# Patient Record
Sex: FEMALE | Race: BLACK OR AFRICAN AMERICAN | NOT HISPANIC OR LATINO | Employment: FULL TIME | URBAN - METROPOLITAN AREA
[De-identification: names, ages, dates, MRNs, and addresses within clinical notes are randomized per-mention and may not be internally consistent; named-entity substitution may affect disease eponyms.]

---

## 2017-02-03 ENCOUNTER — ALLSCRIPTS OFFICE VISIT (OUTPATIENT)
Dept: OTHER | Facility: OTHER | Age: 47
End: 2017-02-03

## 2017-02-15 ENCOUNTER — HOSPITAL ENCOUNTER (OUTPATIENT)
Dept: MAMMOGRAPHY | Facility: MEDICAL CENTER | Age: 47
Discharge: HOME | End: 2017-02-15
Payer: COMMERCIAL

## 2017-02-15 DIAGNOSIS — Z12.31 ENCOUNTER FOR SCREENING MAMMOGRAM FOR MALIGNANT NEOPLASM OF BREAST: ICD-10-CM

## 2017-02-15 PROCEDURE — G0202 SCR MAMMO BI INCL CAD: HCPCS

## 2017-02-15 PROCEDURE — 77063 BREAST TOMOSYNTHESIS BI: CPT

## 2017-02-20 ENCOUNTER — GENERIC CONVERSION - ENCOUNTER (OUTPATIENT)
Dept: OTHER | Facility: OTHER | Age: 47
End: 2017-02-20

## 2017-05-05 ENCOUNTER — ALLSCRIPTS OFFICE VISIT (OUTPATIENT)
Dept: OTHER | Facility: OTHER | Age: 47
End: 2017-05-05

## 2017-05-22 ENCOUNTER — GENERIC CONVERSION - ENCOUNTER (OUTPATIENT)
Dept: OTHER | Facility: OTHER | Age: 47
End: 2017-05-22

## 2017-06-01 ENCOUNTER — GENERIC CONVERSION - ENCOUNTER (OUTPATIENT)
Dept: OTHER | Facility: OTHER | Age: 47
End: 2017-06-01

## 2017-06-12 ENCOUNTER — GENERIC CONVERSION - ENCOUNTER (OUTPATIENT)
Dept: OTHER | Facility: OTHER | Age: 47
End: 2017-06-12

## 2017-07-12 ENCOUNTER — ALLSCRIPTS OFFICE VISIT (OUTPATIENT)
Dept: OTHER | Facility: OTHER | Age: 47
End: 2017-07-12

## 2017-08-01 ENCOUNTER — GENERIC CONVERSION - ENCOUNTER (OUTPATIENT)
Dept: OTHER | Facility: OTHER | Age: 47
End: 2017-08-01

## 2017-09-06 ENCOUNTER — GENERIC CONVERSION - ENCOUNTER (OUTPATIENT)
Dept: OTHER | Facility: OTHER | Age: 47
End: 2017-09-06

## 2018-01-12 VITALS
DIASTOLIC BLOOD PRESSURE: 78 MMHG | BODY MASS INDEX: 30.18 KG/M2 | HEIGHT: 62 IN | WEIGHT: 164 LBS | SYSTOLIC BLOOD PRESSURE: 132 MMHG

## 2018-01-12 NOTE — RESULT NOTES
Dear Blade Loja,   Your test results have returned and are listed below:      Discussion/Summary  Your results show some abnormalities  Your vitamin D level is low, which can affect your bone health  Recommend that you take 4000 to 5000 IU of vitamin D3 per day, which is found over the counter  In addition, you need to take 1200 to 1500 mg of calcium citrate per day, in divided doses of 500 to 600 mg each  Your level will be checked again at your annual follow up  Please keep your regularly scheduled follow-up appointment  If you do not have a follow-up scheduled, please call the office to schedule a follow-up visit  If you have any questions, please don't hesitate to call the office  Sincerely,      Signatures   Electronically signed by : NICA Davenport; May 12 2016  4:30PM EST                       (Author)    Electronically signed by :  LINA Joyner Ra ; May 19 2016  1:33PM EST

## 2018-01-13 VITALS
SYSTOLIC BLOOD PRESSURE: 138 MMHG | TEMPERATURE: 98.1 F | WEIGHT: 161 LBS | HEIGHT: 62 IN | BODY MASS INDEX: 29.63 KG/M2 | DIASTOLIC BLOOD PRESSURE: 80 MMHG

## 2018-01-15 VITALS
WEIGHT: 165 LBS | BODY MASS INDEX: 30.36 KG/M2 | HEIGHT: 62 IN | DIASTOLIC BLOOD PRESSURE: 78 MMHG | SYSTOLIC BLOOD PRESSURE: 128 MMHG

## 2018-02-28 ENCOUNTER — OFFICE VISIT (OUTPATIENT)
Dept: FAMILY MEDICINE CLINIC | Facility: CLINIC | Age: 48
End: 2018-02-28
Payer: COMMERCIAL

## 2018-02-28 ENCOUNTER — TELEPHONE (OUTPATIENT)
Dept: FAMILY MEDICINE CLINIC | Facility: CLINIC | Age: 48
End: 2018-02-28

## 2018-02-28 VITALS
BODY MASS INDEX: 28.16 KG/M2 | WEIGHT: 153 LBS | TEMPERATURE: 98.3 F | HEIGHT: 62 IN | DIASTOLIC BLOOD PRESSURE: 88 MMHG | SYSTOLIC BLOOD PRESSURE: 128 MMHG

## 2018-02-28 DIAGNOSIS — R11.2 NAUSEA AND VOMITING, INTRACTABILITY OF VOMITING NOT SPECIFIED, UNSPECIFIED VOMITING TYPE: ICD-10-CM

## 2018-02-28 DIAGNOSIS — R51.9 NONINTRACTABLE HEADACHE, UNSPECIFIED CHRONICITY PATTERN, UNSPECIFIED HEADACHE TYPE: ICD-10-CM

## 2018-02-28 DIAGNOSIS — R19.7 DIARRHEA, UNSPECIFIED TYPE: ICD-10-CM

## 2018-02-28 DIAGNOSIS — K21.9 GASTROESOPHAGEAL REFLUX DISEASE WITHOUT ESOPHAGITIS: ICD-10-CM

## 2018-02-28 DIAGNOSIS — R10.11 RUQ ABDOMINAL PAIN: Primary | ICD-10-CM

## 2018-02-28 PROCEDURE — 99214 OFFICE O/P EST MOD 30 MIN: CPT | Performed by: FAMILY MEDICINE

## 2018-02-28 RX ORDER — MAGNESIUM 200 MG
TABLET ORAL
COMMUNITY

## 2018-02-28 RX ORDER — EPINEPHRINE 0.3 MG/.3ML
INJECTION SUBCUTANEOUS
COMMUNITY
Start: 2014-07-07 | End: 2020-12-09 | Stop reason: SDUPTHER

## 2018-02-28 RX ORDER — PROMETHAZINE HYDROCHLORIDE 25 MG/1
25 TABLET ORAL DAILY PRN
Qty: 5 TABLET | Refills: 0 | Status: SHIPPED | OUTPATIENT
Start: 2018-02-28 | End: 2020-12-02

## 2018-02-28 RX ORDER — PANTOPRAZOLE SODIUM 20 MG/1
20 TABLET, DELAYED RELEASE ORAL DAILY
Qty: 30 TABLET | Refills: 0 | Status: ON HOLD | OUTPATIENT
Start: 2018-02-28 | End: 2018-04-04 | Stop reason: ALTCHOICE

## 2018-02-28 RX ORDER — GREEN TEA/HOODIA GORDONII 315-12.5MG
CAPSULE ORAL
COMMUNITY

## 2018-02-28 RX ORDER — CALCIUM CITRATE 1040MG
TABLET ORAL
COMMUNITY

## 2018-02-28 RX ORDER — OMEPRAZOLE 20 MG/1
20 CAPSULE, DELAYED RELEASE ORAL DAILY
Qty: 30 CAPSULE | Refills: 0 | Status: SHIPPED | OUTPATIENT
Start: 2018-02-28 | End: 2018-02-28

## 2018-02-28 RX ORDER — ERGOCALCIFEROL 1.25 MG/1
1 CAPSULE ORAL WEEKLY
COMMUNITY
Start: 2017-02-03

## 2018-02-28 NOTE — PROGRESS NOTES
Assessment/Plan:   Chief Complaint   Patient presents with    Vomiting     started two days ago    Diarrhea     pt stated that she needs medication for gas    Headache     Patient Instructions   RUQ abdominal pain off and on for 1 year, worse over the last 3 days with vomiting  Hx of GERd, start omeprazole 20 mg once daily prn gerd/gastritis  Check labs and check US for RUQ abdominal pain and consult General Surgery Dr Marivel Bhatia for abdominal pain  Decrease and quit ETOH use  Diagnoses and all orders for this visit:    RUQ abdominal pain  -     omeprazole (PriLOSEC) 20 mg delayed release capsule; Take 1 capsule (20 mg total) by mouth daily  -     Comprehensive metabolic panel; Future  -     CBC and differential; Future  -     Amylase; Future  -     Lipase; Future    Nausea and vomiting, intractability of vomiting not specified, unspecified vomiting type  -     Comprehensive metabolic panel; Future  -     CBC and differential; Future  -     Amylase; Future  -     Lipase; Future  -     promethazine (PHENERGAN) 25 mg tablet; Take 1 tablet (25 mg total) by mouth daily as needed for nausea or vomiting    Diarrhea, unspecified type  -     Comprehensive metabolic panel; Future  -     CBC and differential; Future  -     Amylase; Future  -     Lipase; Future    Nonintractable headache, unspecified chronicity pattern, unspecified headache type    Gastroesophageal reflux disease without esophagitis  -     omeprazole (PriLOSEC) 20 mg delayed release capsule; Take 1 capsule (20 mg total) by mouth daily          Subjective:     Patient ID: April L Elizabeth Jones is a 57 y o  female  Here for vomiting and diarrhea and flatulence and also headache  This all started this past Monday  Seen for RUQ abdominal pain by Dr Acotsa Due ago about 1 year ago worse with gagging or when gagging it hurts and worse this week due to vomiting and gagging  Ate chicken yesterday  Ate breakfast this am without problems   Hx of GERD and used Aciphex in the past  Hx of gastric bypass in 2011  Drinks alcohol daily of 4-5 shots/ every other day and on weekends of [employer] since 2013  Review of Systems   Constitutional: Negative  HENT: Negative  Eyes: Negative  Respiratory: Negative  Cardiovascular: Negative  Gastrointestinal: Positive for abdominal pain (RUQ abdominal pain with gagging and vomiting), diarrhea and nausea  Vomiting Monday and Tuesday   Endocrine: Negative  Genitourinary: Negative  Musculoskeletal: Negative  Skin: Negative  Allergic/Immunologic: Negative  Neurological: Positive for headaches  Hematological: Negative  Psychiatric/Behavioral: Negative  Objective:     Physical Exam   Constitutional: She is oriented to person, place, and time  She appears well-developed and well-nourished  HENT:   Head: Normocephalic and atraumatic  Right Ear: External ear normal    Left Ear: External ear normal    Nose: Nose normal    Mouth/Throat: Oropharynx is clear and moist    Eyes: Conjunctivae and EOM are normal  Pupils are equal, round, and reactive to light  Neck: Normal range of motion  Neck supple  Cardiovascular: Normal rate, regular rhythm, normal heart sounds and intact distal pulses  Pulmonary/Chest: Effort normal and breath sounds normal    Abdominal: Soft  Bowel sounds are normal    RUQ abdominal pain 8/10 at its worst     Musculoskeletal: Normal range of motion  Neurological: She is alert and oriented to person, place, and time  She has normal reflexes  Skin: Skin is warm and dry  Psychiatric: She has a normal mood and affect   Her behavior is normal

## 2018-02-28 NOTE — LETTER
February 28, 2018     Patient: Yaneth Bassett   YOB: 1970   Date of Visit: 2/28/2018       To Whom it May Concern:    April Anuj Pfeiffer is under my professional care  She was seen in my office on 2/28/2018  She may return to work on 03/05/2018  April has been out of work since 02/26/2018  If you have any questions or concerns, please don't hesitate to call           Sincerely,          Karey Card DO        CC: No Recipients

## 2018-02-28 NOTE — TELEPHONE ENCOUNTER
[facility] called stating that Pantoprazole is not covered at all  They are asking to give generic Nexium 20 mg once daily    Please advise

## 2018-03-01 ENCOUNTER — HOSPITAL ENCOUNTER (OUTPATIENT)
Dept: ULTRASOUND IMAGING | Facility: HOSPITAL | Age: 48
Discharge: HOME | End: 2018-03-01
Payer: COMMERCIAL

## 2018-03-01 ENCOUNTER — APPOINTMENT (OUTPATIENT)
Dept: LAB | Facility: HOSPITAL | Age: 48
End: 2018-03-01
Payer: COMMERCIAL

## 2018-03-01 DIAGNOSIS — R11.2 NAUSEA AND VOMITING, INTRACTABILITY OF VOMITING NOT SPECIFIED, UNSPECIFIED VOMITING TYPE: ICD-10-CM

## 2018-03-01 DIAGNOSIS — R19.7 DIARRHEA, UNSPECIFIED TYPE: ICD-10-CM

## 2018-03-01 DIAGNOSIS — K21.9 GASTROESOPHAGEAL REFLUX DISEASE WITHOUT ESOPHAGITIS: ICD-10-CM

## 2018-03-01 DIAGNOSIS — R10.11 RUQ ABDOMINAL PAIN: ICD-10-CM

## 2018-03-01 LAB
ALBUMIN SERPL BCP-MCNC: 3.8 G/DL (ref 3.5–5)
ALP SERPL-CCNC: 111 U/L (ref 46–116)
ALT SERPL W P-5'-P-CCNC: 23 U/L (ref 12–78)
AMYLASE SERPL-CCNC: 36 IU/L (ref 25–115)
ANION GAP SERPL CALCULATED.3IONS-SCNC: 8 MMOL/L (ref 4–13)
AST SERPL W P-5'-P-CCNC: 26 U/L (ref 5–45)
BASOPHILS # BLD AUTO: 0.01 THOUSANDS/ΜL (ref 0–0.1)
BASOPHILS NFR BLD AUTO: 0 % (ref 0–1)
BILIRUB SERPL-MCNC: 0.75 MG/DL (ref 0.2–1)
BUN SERPL-MCNC: 10 MG/DL (ref 5–25)
CALCIUM SERPL-MCNC: 8.7 MG/DL (ref 8.3–10.1)
CHLORIDE SERPL-SCNC: 104 MMOL/L (ref 100–108)
CO2 SERPL-SCNC: 28 MMOL/L (ref 21–32)
CREAT SERPL-MCNC: 0.64 MG/DL (ref 0.6–1.3)
EOSINOPHIL # BLD AUTO: 0.06 THOUSAND/ΜL (ref 0–0.61)
EOSINOPHIL NFR BLD AUTO: 2 % (ref 0–6)
ERYTHROCYTE [DISTWIDTH] IN BLOOD BY AUTOMATED COUNT: 12.8 % (ref 11.6–15.1)
GFR SERPL CREATININE-BSD FRML MDRD: 124 ML/MIN/1.73SQ M
GLUCOSE SERPL-MCNC: 88 MG/DL (ref 65–140)
HCT VFR BLD AUTO: 38.8 % (ref 34.8–46.1)
HGB BLD-MCNC: 13.8 G/DL (ref 11.5–15.4)
LIPASE SERPL-CCNC: 114 U/L (ref 73–393)
LYMPHOCYTES # BLD AUTO: 2 THOUSANDS/ΜL (ref 0.6–4.47)
LYMPHOCYTES NFR BLD AUTO: 48 % (ref 14–44)
MCH RBC QN AUTO: 34 PG (ref 26.8–34.3)
MCHC RBC AUTO-ENTMCNC: 35.6 G/DL (ref 31.4–37.4)
MCV RBC AUTO: 96 FL (ref 82–98)
MONOCYTES # BLD AUTO: 0.44 THOUSAND/ΜL (ref 0.17–1.22)
MONOCYTES NFR BLD AUTO: 11 % (ref 4–12)
NEUTROPHILS # BLD AUTO: 1.61 THOUSANDS/ΜL (ref 1.85–7.62)
NEUTS SEG NFR BLD AUTO: 39 % (ref 43–75)
NRBC BLD AUTO-RTO: 0 /100 WBCS
PLATELET # BLD AUTO: 209 THOUSANDS/UL (ref 149–390)
PMV BLD AUTO: 10.1 FL (ref 8.9–12.7)
POTASSIUM SERPL-SCNC: 3.7 MMOL/L (ref 3.5–5.3)
PROT SERPL-MCNC: 7.5 G/DL (ref 6.4–8.2)
RBC # BLD AUTO: 4.06 MILLION/UL (ref 3.81–5.12)
SODIUM SERPL-SCNC: 140 MMOL/L (ref 136–145)
WBC # BLD AUTO: 4.12 THOUSAND/UL (ref 4.31–10.16)

## 2018-03-01 PROCEDURE — 85025 COMPLETE CBC W/AUTO DIFF WBC: CPT

## 2018-03-01 PROCEDURE — 76700 US EXAM ABDOM COMPLETE: CPT

## 2018-03-01 PROCEDURE — 83690 ASSAY OF LIPASE: CPT

## 2018-03-01 PROCEDURE — 36415 COLL VENOUS BLD VENIPUNCTURE: CPT

## 2018-03-01 PROCEDURE — 82150 ASSAY OF AMYLASE: CPT

## 2018-03-01 PROCEDURE — 80053 COMPREHEN METABOLIC PANEL: CPT

## 2018-03-02 ENCOUNTER — OFFICE VISIT (OUTPATIENT)
Dept: SURGERY | Facility: CLINIC | Age: 48
End: 2018-03-02
Payer: COMMERCIAL

## 2018-03-02 VITALS
RESPIRATION RATE: 18 BRPM | TEMPERATURE: 96.9 F | BODY MASS INDEX: 28.34 KG/M2 | WEIGHT: 154 LBS | HEART RATE: 74 BPM | HEIGHT: 62 IN | SYSTOLIC BLOOD PRESSURE: 120 MMHG | DIASTOLIC BLOOD PRESSURE: 82 MMHG

## 2018-03-02 DIAGNOSIS — L90.5 SCAR PAIN: ICD-10-CM

## 2018-03-02 DIAGNOSIS — R10.11 RUQ ABDOMINAL PAIN: ICD-10-CM

## 2018-03-02 DIAGNOSIS — K43.9 VENTRAL HERNIA WITHOUT OBSTRUCTION OR GANGRENE: ICD-10-CM

## 2018-03-02 DIAGNOSIS — R10.11 RIGHT UPPER QUADRANT ABDOMINAL PAIN: ICD-10-CM

## 2018-03-02 DIAGNOSIS — R10.13 EPIGASTRIC PAIN: Primary | ICD-10-CM

## 2018-03-02 DIAGNOSIS — R11.2 NAUSEA AND VOMITING, INTRACTABILITY OF VOMITING NOT SPECIFIED, UNSPECIFIED VOMITING TYPE: ICD-10-CM

## 2018-03-02 DIAGNOSIS — R19.7 DIARRHEA, UNSPECIFIED TYPE: ICD-10-CM

## 2018-03-02 DIAGNOSIS — K21.9 GASTROESOPHAGEAL REFLUX DISEASE WITHOUT ESOPHAGITIS: ICD-10-CM

## 2018-03-02 PROCEDURE — 99243 OFF/OP CNSLTJ NEW/EST LOW 30: CPT | Performed by: SURGERY

## 2018-03-02 NOTE — PROGRESS NOTES
Assessment/Plan:   Echo Tony is a 57 y  o female who is here for The primary encounter diagnosis was Right upper quadrant abdominal pain  Diagnoses of RUQ abdominal pain, Nausea and vomiting, intractability of vomiting not specified, unspecified vomiting type, Diarrhea, unspecified type, and Gastroesophageal reflux disease without esophagitis were also pertinent to this visit  Plan:  Follow up with bariatric surgery consider EGD for ulcer, considering her increased alcohol intake      CT scan of abdomen and pelvis to rule out a port site hernia which is not palpable today but clinically meets that description  If she can not getting in a timely fashion to see the bariatric surgeon, we will schedule her EGD for her  Preoperative Clearance: None            ______________________________________________________  CC:Abdominal Pain (right sided comes and goes  1 year ) and Patient Education (Given to patient )    HPI:  Echo Villarreal is a 57 y  o female who was referred for evaluation of Abdominal Pain (right sided comes and goes  1 year ) and Patient Education (Given to patient )    Currently  Tender at site of previous hernia repair  With scar, she says there is a lump or bulge when she is driving or working out but I cannot see this today  She also quietly admitted that she has been increasing her alcohol intake and she thinks she might have an ulcer  Repaired three years ago, after a previous gastric bypass in 2011  Now with more pain at that area  Pops out when she coughs,  Two thousand eleven she had a Darion-en-Y gastric bypass by Dr Ana Maria Trujillo  Two thousand thirteen she had an internal hernia at [facility] defect that was repaired at that time  I do not believe she had a abdominal wall hernia repaired at that time        ROS:  General ROS: negative  negative for - chills, fatigue, fever or night sweats, weight loss  Respiratory ROS: no cough, shortness of breath, or wheezing  Cardiovascular ROS: no chest pain or dyspnea on exertion  Genito-Urinary ROS: no dysuria, trouble voiding, or hematuria  Musculoskeletal ROS: negative for - gait disturbance, joint pain or muscle pain  Neurological ROS: no TIA or stroke symptoms  abdominal pain and see HPI  Skin ROS: no new rashes or lesions   Lymphatic ROS: no new adenopathy noted by pt  GYN ROS: see HPI, no new GYN history or bleeding noted  Psy ROS: no new mental or behavioral disturbances       There is no problem list on file for this patient          Allergies:  Crab extract allergy skin test; Penicillins; and Amoxicillin      Current Outpatient Prescriptions:     Calcium Citrate 1040 MG TABS, Take by mouth, Disp: , Rfl:     Cyanocobalamin (VITAMIN B-12) 1000 MCG SUBL, Place under the tongue, Disp: , Rfl:     EPINEPHrine (EPIPEN 2-ANIKA) 0 3 mg/0 3 mL SOAJ, Inject as directed, Disp: , Rfl:     ergocalciferol (VITAMIN D2) 50,000 units, Take 1 capsule by mouth once a week, Disp: , Rfl:     Multiple Vitamins-Minerals (HAIR/SKIN/NAILS/BIOTIN) TABS, Take by mouth, Disp: , Rfl:     promethazine (PHENERGAN) 25 mg tablet, Take 1 tablet (25 mg total) by mouth daily as needed for nausea or vomiting, Disp: 5 tablet, Rfl: 0    dicyclomine (BENTYL) 20 mg tablet, Take 1 tablet by mouth every 6 (six) hours as needed (abd pain) for up to 12 doses, Disp: 12 tablet, Rfl: 0    ondansetron (ZOFRAN-ODT) 4 mg disintegrating tablet, Take 1 tablet by mouth every 8 (eight) hours as needed for nausea or vomiting for up to 10 doses, Disp: 10 tablet, Rfl: 0    pantoprazole (PROTONIX) 20 mg tablet, Take 1 tablet (20 mg total) by mouth daily, Disp: 30 tablet, Rfl: 0    Pediatric Multivitamins-Fl (MULTIVITAMINS/FLUORIDE) 0 25 MG CHEW, Chew, Disp: , Rfl:     Past Medical History:   Diagnosis Date    Frequent headaches        Past Surgical History:   Procedure Laterality Date    GASTRIC BYPASS      ROTATOR CUFF REPAIR         Family History   Problem Relation Age of Onset    No Known Problems Family         reports that she has been smoking  She does not have any smokeless tobacco history on file  She reports that she drinks alcohol  She reports that she does not use drugs  Labs:   Lab Results   Component Value Date    WBC 4 12 (L) 03/01/2018    HGB 13 8 03/01/2018    HCT 38 8 03/01/2018    MCV 96 03/01/2018     03/01/2018     Lab Results   Component Value Date     03/01/2018    K 3 7 03/01/2018     03/01/2018    CO2 28 03/01/2018    ANIONGAP 8 03/01/2018    BUN 10 03/01/2018    CREATININE 0 64 03/01/2018    GLUCOSE 88 03/01/2018    CALCIUM 8 7 03/01/2018    AST 26 03/01/2018    ALT 23 03/01/2018    ALKPHOS 111 03/01/2018    PROT 7 5 03/01/2018    BILITOT 0 75 03/01/2018    EGFR 124 03/01/2018         Imaging: No new pertinent imaging studies  PHYSICAL EXAM  General Appearance:    Alert, cooperative, no distress,    Head:    Normocephalic without obvious abnormality   Eyes:    PERRL, conjunctiva/corneas clear, EOM's intact        Neck:   Supple, no adenopathy, no JVD   Back:     Symmetric, no spinal or CVA tenderness   Lungs:     Clear to auscultation bilaterally, no wheezing or rhonchi   Heart:    Regular rate and rhythm, S1 and S2 normal, no murmur   Abdomen:    soft NTND, +BS   Extremities:   Extremities normal  No clubbing, cyanosis or edema   Psych:   Normal Affect, AOx3  Neurologic:  Skin:   CNII-XII intact  Strength symmetric, speech intact    Warm, dry, intact, no visible rashes or lesions                       Some portions of this record may have been generated with voice recognition software  There may be translation, syntax,  or grammatical errors  Occasional wrong word or "sound-a-like" substitutions may have occurred due to the inherent limitations of the voice recognition software  Read the chart carefully and recognize, using context, where substitutions may have occurred   If you have any questions, please contact the dictating provider for clarification or correction, as needed  This encounter has been coded by a non-certified coder         Radha Holt MD    Date: 3/2/2018 Time: 11:26 AM

## 2018-03-08 ENCOUNTER — HOSPITAL ENCOUNTER (OUTPATIENT)
Dept: CT IMAGING | Facility: HOSPITAL | Age: 48
Discharge: HOME | End: 2018-03-08
Attending: SURGERY
Payer: COMMERCIAL

## 2018-03-08 ENCOUNTER — OFFICE VISIT (OUTPATIENT)
Dept: BARIATRICS | Facility: CLINIC | Age: 48
End: 2018-03-08
Payer: COMMERCIAL

## 2018-03-08 VITALS
BODY MASS INDEX: 28.98 KG/M2 | RESPIRATION RATE: 16 BRPM | SYSTOLIC BLOOD PRESSURE: 126 MMHG | TEMPERATURE: 97.6 F | WEIGHT: 157.5 LBS | HEART RATE: 72 BPM | DIASTOLIC BLOOD PRESSURE: 78 MMHG | HEIGHT: 62 IN

## 2018-03-08 DIAGNOSIS — K91.2 POSTSURGICAL MALABSORPTION: ICD-10-CM

## 2018-03-08 DIAGNOSIS — R10.11 RIGHT UPPER QUADRANT ABDOMINAL PAIN: ICD-10-CM

## 2018-03-08 DIAGNOSIS — Z98.84 BARIATRIC SURGERY STATUS: ICD-10-CM

## 2018-03-08 DIAGNOSIS — R10.13 EPIGASTRIC PAIN: Primary | ICD-10-CM

## 2018-03-08 DIAGNOSIS — K43.9 VENTRAL HERNIA WITHOUT OBSTRUCTION OR GANGRENE: ICD-10-CM

## 2018-03-08 PROBLEM — M85.80 OSTEOPENIA: Status: ACTIVE | Noted: 2017-02-03

## 2018-03-08 PROCEDURE — 99214 OFFICE O/P EST MOD 30 MIN: CPT | Performed by: PHYSICIAN ASSISTANT

## 2018-03-08 PROCEDURE — 74177 CT ABD & PELVIS W/CONTRAST: CPT

## 2018-03-08 RX ORDER — OMEPRAZOLE 20 MG/1
20 TABLET, DELAYED RELEASE ORAL DAILY
COMMUNITY
End: 2021-09-17 | Stop reason: SDUPTHER

## 2018-03-08 RX ADMIN — IOHEXOL 100 ML: 350 INJECTION, SOLUTION INTRAVENOUS at 13:24

## 2018-03-08 NOTE — PROGRESS NOTES
Assessment/Plan:    Bariatric surgery status  -s/p Darion-En-Y Gastric Bypass with Dr Aleksey Trammell on 3/7/2011  Overall doing Well with weight loss but unfortunately is smoking and drinking alcohol daily  She is here with right sided abdominal pain and epigastric abdominal pain  She has been lost to follow-up to our office for 2 years  Initial:   Current: 157 5 lb  EWL: 73% which is very good weight loss overall  Sudheer: current  Current BMI is Body mass index is 29 28 kg/m²  Tolerating a regular diet-yes but has been having intermittent epigastric pain with meals   Eating at least 60 grams of protein per day-has been inconsistent with her intakes  Following 30/60 minute rule with liquids-no  Drinking at least 64 ounces of fluid per day-yes  Drinking carbonated beverages-no  Sufficient exercise-yes  Using NSAIDs regularly-no  Using nicotine-yes  Using alcohol-yes    She notes she is smoking "clove cigars with nicotine" up to 3 per day and is drinking at least 5 alcoholic shots most days of the week-  Advised on effect of alcohol post-surgery and also effect of smoking with gastric pouch  Advised on importance of abstinence  Right upper quadrant abdominal pain  She also complains of a separate right upper quadrant focalized abdominal pain and "bulge" that occurs particularly when she gags when brushing her teeth  On exam the area that tends to cause her pain is around her right mid-incision site and is concerning for a possible incisional hernia  Pain is not worsened with meals and per her description reduces with massaging the area  I was unable to appreciate this on exam  She had no pain on palpation  And Hernandez's sign was negative/no rebound or guarding  She notes she is getting a CT scan of abdomen today for evaluation of this  She has had a consultation with Dr Fabien Valdez for this   Per patient she indicated since she has already been established with Dr Aleksey Trammell she feels if she needs surgery for this she would want him to do it  Advised that either Dr Savi Hensley or Dr Amando Angeles can address this if she potentially needs a hernia repair  I am scheduling upper endoscopy to rule out ulcer  And then follow-up with Dr Amando Angeles and he can review both EGD and CT results if/as needed  Will ask our  to also reach out to her regarding alcohol intakes and smoking  Epigastric pain  She is complaining of a few week history of  Intermittent epigastric abdominal pain which is worse with meals and worse with drinking alcohol  She notes pain has improved at times with taking peptobismol  Unfortunately she indicates she drinks around 5 alcoholic shots/day and is smoking clove cigars(nicotine) up to 3 per day  She denies use of NSAIDs  No associated fever,chills, nausea or vomiting  On exam she was mildly tender to palpation to epigastric area without rebound or guarding  Concern is for ulcer and advised on importance of smoking cessation and avoiding alcohol  Advised continued use could be life-altering and advised of effect after gastric surgery  Postsurgical malabsorption  -At risk for malabsorption of vitamins/minerals secondary to malabsorption and restriction of intake from their procedure  -Currently taking adequate postop bariatric surgery vitamin supplementation-no  -Last set of bariatric labs completed on May 206 and are not within acceptable limits   -Next set of bariatric labs ordered for approximately 1 month     She is only taking one regular mvi, one calcium, extra D and extra Vitamin B12 but notes she is not consistent-advised on importance of same  I am providing her with written instructions on what she should be taking-advised to take these for at least one month prior to getting her labs done  Alcohol use disorder ([facility] [address] )  By history-and reports drinking hard liquor up to 5 shots/day  Will have  contact her for follow-up         Diagnoses and all orders for this visit:    Epigastric pain    Bariatric surgery status    Postsurgical malabsorption    Right upper quadrant abdominal pain    Other orders  -     omeprazole (PriLOSEC OTC) 20 MG tablet; Take 20 mg by mouth daily          Subjective:      Patient ID: April L Kwadwo Devine is a 57 y o  female  She is here  For late routine visit and is complaining of a right sided pain and epigastric pain  She is taking some regular vitamins but not a complete amount and is not consistent with this  No fever,chills, nausea/vomiting with the pain  The following portions of the patient's history were reviewed and updated as appropriate: allergies, current medications, past family history, past medical history, past social history, past surgical history and problem list     Review of Systems   Constitutional: Negative for chills and fever  Weight is stable from 2 years ago   Respiratory: Negative for shortness of breath and wheezing  Gastrointestinal: Positive for abdominal pain  Negative for constipation, diarrhea and nausea  Psychiatric/Behavioral:        Reports mood is good         Objective:      /78 (BP Location: Right leg, Patient Position: Sitting, Cuff Size: Standard)   Pulse 72   Temp 97 6 °F (36 4 °C) (Tympanic)   Resp 16   Ht 5' 1 5" (1 562 m)   Wt 71 4 kg (157 lb 8 oz)   BMI 29 28 kg/m²          Physical Exam   Constitutional: She is oriented to person, place, and time  She appears well-developed and well-nourished  HENT:   Mouth/Throat: Oropharynx is clear and moist    Eyes: Conjunctivae are normal  No scleral icterus  Cardiovascular: Normal rate and regular rhythm  Pulmonary/Chest: Effort normal and breath sounds normal    Abdominal: Soft  Bowel sounds are normal  She exhibits no mass  There is tenderness  There is no rebound and no guarding  No incisional hernias appreciated   Musculoskeletal:   Normal gait   Neurological: She is alert and oriented to person, place, and time  Psychiatric: She has a normal mood and affect  Her behavior is normal  Judgment and thought content normal    Nursing note and vitals reviewed  GOALS: Maintain  weight loss with good nutrition intakes    Normal vitamin and mineral levels  Exercise as tolerated  Resolve abdominal pain    BARRIERS: regular alcohol intakes and regular smoking

## 2018-03-08 NOTE — PATIENT INSTRUCTIONS
Get Cat scan done as ordered  Get upper endoscopy done and then follow-up in the office with Dr Josiah Villalobos to review results  Take your antacid twice a day -before breakfast and dinner daily  Avoid all smoking and alcohol    Follow-up in 1 year for routine visit  Follow diet as discussed  Get lab work done as ordered    It is recommended to check with your insurance BEFORE getting labs done to make sure they are covered by your policy  Make sure to HOLD any multivitamins that may contain biotin and any biotin supplements FOR 5 DAYS before any labs since it can affect the results  Follow vitamin  and mineral recommendations as reviewed with you  Exercise as tolerated  Call our office if you have any problems with abdominal pain especially associated with fever, chills, nausea, vomiting or any other concerns  All  Post-bariatric surgery patients should be aware that very small quantities of any alcohol  can cause impairment and it is very possible not to feel the effect  The effect can be in the system for several hours  It is also a stomach irritant  It is advised to AVOID alcohol, Nonsteroidal antiinflammatory drugs (NSAIDS) and nicotine of all forms   Any of these can cause stomach irritation/pain

## 2018-03-09 NOTE — PROGRESS NOTES
Please call pt with abnormal results and schedule follow up  Please call port site, and the patient has gallstones  Bariatric surgery may want to also deal with with her gallstones especially for EGD is negative  Either 1 of these could be causing her right upper pain

## 2018-03-13 DIAGNOSIS — K91.2 POSTSURGICAL MALABSORPTION: Primary | ICD-10-CM

## 2018-03-13 DIAGNOSIS — Z98.84 BARIATRIC SURGERY STATUS: ICD-10-CM

## 2018-03-15 ENCOUNTER — OFFICE VISIT (OUTPATIENT)
Dept: BARIATRICS | Facility: CLINIC | Age: 48
End: 2018-03-15

## 2018-03-15 VITALS
DIASTOLIC BLOOD PRESSURE: 80 MMHG | BODY MASS INDEX: 29.35 KG/M2 | WEIGHT: 159.5 LBS | TEMPERATURE: 97.9 F | HEART RATE: 68 BPM | HEIGHT: 62 IN | SYSTOLIC BLOOD PRESSURE: 140 MMHG

## 2018-03-15 DIAGNOSIS — R10.11 RIGHT UPPER QUADRANT ABDOMINAL PAIN: ICD-10-CM

## 2018-03-15 DIAGNOSIS — R10.13 EPIGASTRIC PAIN: ICD-10-CM

## 2018-03-15 DIAGNOSIS — Z98.84 BARIATRIC SURGERY STATUS: Primary | ICD-10-CM

## 2018-03-15 DIAGNOSIS — K21.9 GASTROESOPHAGEAL REFLUX DISEASE, ESOPHAGITIS PRESENCE NOT SPECIFIED: ICD-10-CM

## 2018-03-15 PROCEDURE — RECHECK: Performed by: SURGERY

## 2018-03-15 NOTE — PROGRESS NOTES
Assessment/Plan: Patient is a 70-year-old woman status post Darion-en-Y gastric bypass by Dr Megan Garces in 2011  She underwent a diagnostic laparoscopy and repair of internal hernia in 2013  She has maintained a 71% excess weight loss  In the last several months, however, she has developed intermittent right upper quadrant pain  Workup in the last month with CT scan and ultrasound revealed gallstones  Per the patient's report of concomitant reflux symptoms, she will be undergoing an elective upper endoscopy on April 4th  At her post endoscopy follow-up, we will discuss surgical options for her gallbladder and any additional findings  Diagnoses and all orders for this visit:    Bariatric surgery status    Right upper quadrant abdominal pain    Epigastric pain    Gastroesophageal reflux disease, esophagitis presence not specified          Subjective: Patient doing well but continues to have intermittent, self-limited right upper quadrant pain  She is also by continued and increased alcohol intake; she has discuss this with our  and is continuing to follow-up  Patient ID: April ELVER Doherty is a 57 y o  female      HPI see assessment/plan    Review of Systems    Denies fever/chills  Denies lightheadedness  Denies visual changes  Denies dyspnea, cough  Denies chest pain  Denies nausea/vomiting  Denies change in urinary/bowel habits  Denies masses/hernias  Denies parasthesia  Denies peripheral edema    Objective:     Physical Exam    Vitals:    03/15/18 1127   BP: 140/80   Pulse: 68   Temp: 97 9 °F (36 6 °C)     NAD, alert  No pallor  MMM  No JVD  RRR  Normal inspiratory effort  Abdomen soft, NT/ND  Incisions c/d/i, no masses or hernias  No peripheral edema  Normal affect, no agitation

## 2018-04-03 ENCOUNTER — ANESTHESIA EVENT (OUTPATIENT)
Dept: GASTROENTEROLOGY | Facility: HOSPITAL | Age: 48
End: 2018-04-03
Payer: COMMERCIAL

## 2018-04-04 ENCOUNTER — HOSPITAL ENCOUNTER (OUTPATIENT)
Facility: HOSPITAL | Age: 48
Setting detail: OUTPATIENT SURGERY
Discharge: HOME | End: 2018-04-04
Attending: SURGERY | Admitting: SURGERY
Payer: COMMERCIAL

## 2018-04-04 ENCOUNTER — ANESTHESIA (OUTPATIENT)
Dept: GASTROENTEROLOGY | Facility: HOSPITAL | Age: 48
End: 2018-04-04
Payer: COMMERCIAL

## 2018-04-04 VITALS
DIASTOLIC BLOOD PRESSURE: 68 MMHG | HEIGHT: 62 IN | RESPIRATION RATE: 22 BRPM | HEART RATE: 65 BPM | WEIGHT: 162 LBS | SYSTOLIC BLOOD PRESSURE: 116 MMHG | BODY MASS INDEX: 29.81 KG/M2 | OXYGEN SATURATION: 100 % | TEMPERATURE: 96.3 F

## 2018-04-04 DIAGNOSIS — Z98.84 BARIATRIC SURGERY STATUS: Primary | ICD-10-CM

## 2018-04-04 PROCEDURE — 43235 EGD DIAGNOSTIC BRUSH WASH: CPT | Performed by: SURGERY

## 2018-04-04 RX ORDER — SODIUM CHLORIDE 9 MG/ML
125 INJECTION, SOLUTION INTRAVENOUS CONTINUOUS
Status: DISCONTINUED | OUTPATIENT
Start: 2018-04-04 | End: 2018-04-04 | Stop reason: HOSPADM

## 2018-04-04 RX ORDER — PROPOFOL 10 MG/ML
INJECTION, EMULSION INTRAVENOUS AS NEEDED
Status: DISCONTINUED | OUTPATIENT
Start: 2018-04-04 | End: 2018-04-04 | Stop reason: SURG

## 2018-04-04 RX ORDER — SUCRALFATE 1 G/1
1 TABLET ORAL 4 TIMES DAILY
Qty: 120 TABLET | Refills: 0 | Status: CANCELLED | OUTPATIENT
Start: 2018-04-04 | End: 2018-05-04

## 2018-04-04 RX ORDER — OMEPRAZOLE 20 MG/1
20 TABLET, DELAYED RELEASE ORAL DAILY
Qty: 30 TABLET | Refills: 0 | Status: CANCELLED | OUTPATIENT
Start: 2018-04-04 | End: 2018-05-04

## 2018-04-04 RX ADMIN — LIDOCAINE HYDROCHLORIDE 60 MG: 20 INJECTION, SOLUTION INTRAVENOUS at 09:36

## 2018-04-04 RX ADMIN — SODIUM CHLORIDE 125 ML/HR: 0.9 INJECTION, SOLUTION INTRAVENOUS at 09:02

## 2018-04-04 RX ADMIN — PROPOFOL 150 MG: 10 INJECTION, EMULSION INTRAVENOUS at 09:36

## 2018-04-04 NOTE — DISCHARGE INSTRUCTIONS
Gastritis   WHAT YOU NEED TO KNOW:   Gastritis is inflammation or irritation of the lining of your stomach  DISCHARGE INSTRUCTIONS:   Call 911 for any of the following:   · You develop chest pain or shortness of breath  Seek care immediately if:   · You vomit blood  · You have black or bloody bowel movements  · You have severe stomach or back pain  Contact your healthcare provider if:   · You have a fever  · You have new or worsening symptoms, even after treatment  · You have questions or concerns about your condition or care  Medicines:   · Medicines  may be given to help treat a bacterial infection or decrease stomach acid  · Take your medicine as directed  Contact your healthcare provider if you think your medicine is not helping or if you have side effects  Tell him or her if you are allergic to any medicine  Keep a list of the medicines, vitamins, and herbs you take  Include the amounts, and when and why you take them  Bring the list or the pill bottles to follow-up visits  Carry your medicine list with you in case of an emergency  Manage or prevent gastritis:   · Do not smoke  Nicotine and other chemicals in cigarettes and cigars can make your symptoms worse and cause lung damage  Ask your healthcare provider for information if you currently smoke and need help to quit  E-cigarettes or smokeless tobacco still contain nicotine  Talk to your healthcare provider before you use these products  · Do not drink alcohol  Alcohol can prevent healing and make your gastritis worse  Talk to your healthcare provider if you need help to stop drinking  · Do not take NSAIDs or aspirin unless directed  These and similar medicines can cause irritation  If your healthcare provider says it is okay to take NSAIDs, take them with food  · Do not eat foods that cause irritation  Foods such as oranges and salsa can cause burning or pain  Eat a variety of healthy foods   Examples include fruits (not citrus), vegetables, low-fat dairy products, beans, whole-grain breads, and lean meats and fish  Try to eat small meals, and drink water with your meals  Do not eat for at least 3 hours before you go to bed  · Find ways to relax and decrease stress  Stress can increase stomach acid and make gastritis worse  Activities such as yoga, meditation, or listening to music can help you relax  Spend time with friends, or do things you enjoy  Follow up with your healthcare provider as directed: You may need ongoing tests or treatment, or referral to a gastroenterologist  Write down your questions so you remember to ask them during your visits  © 2017 [address] Information is for End User's use only and may not be sold, redistributed or otherwise used for commercial purposes  All illustrations and images included in CareNotes® are the copyrighted property of A D A M [employer] Inc  or Michael Daugherty  The above information is an  only  It is not intended as medical advice for individual conditions or treatments  Talk to your doctor, nurse or pharmacist before following any medical regimen to see if it is safe and effective for you  Upper Endoscopy   WHAT YOU NEED TO KNOW:   An upper endoscopy is also called an upper gastrointestinal (GI) endoscopy, or an esophagogastroduodenoscopy (EGD)  You may feel bloated, gassy, or have some abdominal discomfort after your procedure  Your throat may be sore for 24 to 36 hours  You may burp or pass gas from air that is still inside your body  DISCHARGE INSTRUCTIONS:   Call 911 for any of the following:   · You have sudden chest pain or trouble breathing  Seek care immediately if:   · You feel dizzy or faint  · You have trouble swallowing  · Your bowel movements are very dark or black  · Your abdomen is hard and firm and you have severe pain  · You vomit blood    Contact your healthcare provider if:   · You feel full or bloated and cannot burp or pass gas  · You have not had a bowel movement for 3 days after your procedure  · You have neck pain  · You have a fever or chills  · You have nausea or are vomiting  · You have a rash or hives  · You have questions or concerns about your endoscopy  Relieve a sore throat:  Suck on throat lozenges or crushed ice  Gargle with a small amount of warm salt water  Mix 1 teaspoon of salt and 1 cup of warm water to make salt water  Relieve gas and discomfort from bloating:  Lie on your right side with a heating pad on your abdomen  Take short walks to help pass gas  Eat small meals until bloating is relieved  Rest after your procedure: You have been given medicine to relax you  Do not  drive or make important decisions until the day after your procedure  Return to your normal activity as directed  You can usually return to work the day after your procedure  Follow up with your healthcare provider as directed:  Write down your questions so you remember to ask them during your visits  © 2017 [address] is for End User's use only and may not be sold, redistributed or otherwise used for commercial purposes  All illustrations and images included in CareNotes® are the copyrighted property of A D A M , Inc  or [employer]  The above information is an  only  It is not intended as medical advice for individual conditions or treatments  Talk to your doctor, nurse or pharmacist before following any medical regimen to see if it is safe and effective for you  Omeprazole (By mouth)   Omeprazole ([id number])  Treats heartburn, a damaged esophagus, stomach ulcers, and gastroesophageal reflux disease (GERD)  This medicine is a proton pump inhibitor (PPI)     Brand Name(s): First-Omeprazole, [facility] Omeprazole, Good Sense Omeprazole, Leader Omeprazole, Omeclamox-Nura, PrevidolRx Analgesic Nura, PriLOSEC, PriLOSEC OTC, [facility] Choice Omeprazole Magnesium, [facility] Omeprazole, [facility] Omeprazole, TopCare Omeprazole   There may be other brand names for this medicine  When This Medicine Should Not Be Used: This medicine is not right for everyone  Do not use it if you had an allergic reaction to omeprazole or similar medicines  How to Use This Medicine:   Delayed Release Capsule, Packet, Powder for Suspension, Delayed Release Tablet  · Your doctor will tell you how much medicine to use  Do not use more than directed  · This medicine should come with a Medication Guide  Ask your pharmacist for a copy if you do not have one  · Follow the instructions on the medicine label if you are using this medicine without a prescription  If you are using the over-the-counter medicine, do not take it for more than 14 days or use the treatment more often than every 4 months unless your doctor tells you to  · Take this medicine at least 1 hour before a meal and for the full time of treatment, even if you begin to feel better after a few days  · Capsule or tablet:   ¨ Swallow whole  Do not crush or chew it  ¨ If you cannot swallow the capsule, you may open it and pour the medicine into a small amount of applesauce  Stir this mixture well and swallow it without chewing  To help make sure you get the full dose, drink a full glass of water  · Oral packet:   ¨ Mix the contents of a 2 5-milligram (mg) packet with 5 milliliters (mL) of water or mix the contents of a 10-mg packet with 15 mL of water  Do not use other liquids or food  ¨ Stir well  Let the mixture thicken for 2 to 3 minutes  ¨ Stir again and drink the medicine within 30 minutes  ¨ If there is any medicine left, add more water, stir, and drink immediately  § To prepare the oral packet for a feeding tube:   § Add 5 mL of water to a catheter-tipped syringe  Then add the contents of a 2 5-mg packet (or use 15 mL of water for the 10-mg packet)    § Shake the syringe right away  Let the mixture thicken for 2 to 3 minutes  § Shake the syringe once more  Give the medicine through the tube within 30 minutes  § Refill the syringe with an equal amount of water and shake it  Use the water to flush the tube to make sure all the medicine is given  · Missed dose: Take a dose as soon as you remember  If it is almost time for your next dose, wait until then and take a regular dose  Do not take extra medicine to make up for a missed dose  · Store the medicine in a closed container at room temperature, away from heat, moisture, and direct light  Drugs and Foods to Avoid:   Ask your doctor or pharmacist before using any other medicine, including over-the-counter medicines, vitamins, and herbal products  · Do not use omeprazole if you are also using medicines that contain rilpivirine  · Some foods and medicines can affect how omeprazole works  Tell your doctor if you are using any of the following:  ¨ Amoxicillin, atazanavir, cilostazol, citalopram, clarithromycin, clopidogrel, cyclosporine, dasatinib, digoxin, disulfiram, erlotinib, itraconazole, ketoconazole, methotrexate, mycophenolate mofetil, nelfinavir, nilotinib, phenytoin, rifampin, saquinavir, Tye's wort, tacrolimus, voriconazole  ¨ Benzodiazepine medicine (including diazepam)  ¨ Blood thinner (including warfarin)  ¨ Diuretic (water pill)  ¨ Iron supplements  Warnings While Using This Medicine:   · Tell your doctor if you are pregnant or breastfeeding, or if you have liver disease, lupus, or osteoporosis  · This medicine may cause the following problems:   ¨ Kidney problems  ¨ Low vitamin B12 or magnesium levels in the blood  ¨ Increased risk of broken bones in the hip, wrist, or spine  · This medicine can cause diarrhea  Call your doctor if the diarrhea becomes severe, does not stop, or is bloody  Do not take any medicine to stop diarrhea until you have talked to your doctor   Diarrhea can occur 2 months or more after you stop taking this medicine  · Tell any doctor or dentist who treats you that you are using this medicine  This medicine may affect certain medical test results  · Your doctor will check your progress and the effects of this medicine at regular visits  Keep all appointments  · Keep all medicine out of the reach of children  Never share your medicine with anyone  Possible Side Effects While Using This Medicine:   Call your doctor right away if you notice any of these side effects:  · Allergic reaction: Itching or hives, swelling in your face or hands, swelling or tingling in your mouth or throat, chest tightness, trouble breathing  · Blistering, peeling, red skin rash  · Fever, joint pain, swelling in the body, unusual weight gain, change in how much or how often you urinate  · Joint pain, rash on your cheeks or arms that gets worse in the sun  · Seizures, dizziness, uneven heartbeat, muscle cramps or twitching  · Severe diarrhea, stomach cramps, fever  · Stomach pain, nausea, vomiting, weight loss  If you notice these less serious side effects, talk with your doctor:   · Headache  · Mild diarrhea or stomach pain  If you notice other side effects that you think are caused by this medicine, tell your doctor  Call your doctor for medical advice about side effects  You may report side effects to FDA at 2-492-FDA-9654  © 2017 [address]  Information is for End User's use only and may not be sold, redistributed or otherwise used for commercial purposes  The above information is an  only  It is not intended as medical advice for individual conditions or treatments  Talk to your doctor, nurse or pharmacist before following any medical regimen to see if it is safe and effective for you  Sucralfate (By mouth)   Sucralfate ([id number])  Treats ulcers  Brand Name(s): Carafate   There may be other brand names for this medicine  When This Medicine Should Not Be Used:    You should not use this medicine if you have had an allergic reaction to it  How to Use This Medicine:   Tablet, Liquid  · Your doctor will tell you how much to take and how often  · Do not stop taking the medicine unless your doctor tells you to, even if you feel better  · Take your medicine on an empty stomach  Swallow the tablet with a glass of water  · Unless your doctor tells you otherwise, take the medicine 1 hour before meals and at bedtime  · Measure the oral liquid medicine using a measuring spoon or medicine cup  · Shake the oral liquid medicine well before using  If a dose is missed:   · Take your medicine as soon as you remember that you missed your dose  · If it is nearly time for your next dose, wait until then to take your medicine and skip the missed dose  · You should not use two doses at one time  How to Store and Dispose of This Medicine:   · Keep the medicine at room temperature, away from heat, light, and moisture  Do not freeze the oral liquid  · Keep all medicine out of the reach of children  Drugs and Foods to Avoid:   Ask your doctor or pharmacist before using any other medicine, including over-the-counter medicines, vitamins, and herbal products  · Antacids may be taken one-half hour before or after taking sucralfate  · You should not take other medicines within 2 hours before or after taking sucralfate  If you need help deciding the best times to take your other medicines, ask your doctor or pharmacist   Warnings While Using This Medicine:   · If you are pregnant or breastfeeding, talk to your doctor before taking this medicine  · Check with your doctor before taking if you have kidney problems or are on dialysis    Possible Side Effects While Using This Medicine:   Call your doctor right away if you notice any of these side effects:  · Rash, hives, or itching  · Swelling of the face or mouth  If you notice these less serious side effects, talk with your doctor: · Constipation  · Dry mouth  · Mild stomach cramps, diarrhea  · Upset stomach, gas  · Dizziness  If you notice other side effects that you think are caused by this medicine, tell your doctor  Call your doctor for medical advice about side effects  You may report side effects to FDA at 8-816-FDA-6607  © 2017 [address] Information is for End User's use only and may not be sold, redistributed or otherwise used for commercial purposes  The above information is an  only  It is not intended as medical advice for individual conditions or treatments  Talk to your doctor, nurse or pharmacist before following any medical regimen to see if it is safe and effective for you  Cigarette Smoking and Your Health   AMBULATORY CARE:   Risks to your health if you smoke:  Nicotine and other chemicals found in tobacco damage every cell in your body  Even if you are a light smoker, you have an increased risk for cancer, heart disease, and lung disease  If you are pregnant or have diabetes, smoking increases your risk for complications  Benefits to your health if you stop smoking:   · You decrease respiratory symptoms such as coughing, wheezing, and shortness of breath  · You reduce your risk for cancers of the lung, mouth, throat, kidney, bladder, pancreas, stomach, and cervix  If you already have cancer, you increase the benefits of chemotherapy  You also reduce your risk for cancer returning or a second cancer from developing  · You reduce your risk for heart disease, blood clots, heart attack, and stroke  · You reduce your risk for lung infections, and diseases such as pneumonia, asthma, chronic bronchitis, and emphysema  · Your circulation improves  More oxygen can be delivered to your body  If you have diabetes, you lower your risk for complications, such as kidney, artery, and eye diseases  You also lower your risk for nerve damage   Nerve damage can lead to amputations, poor vision, and blindness  · You improve your body's ability to heal and to fight infections  Benefits to the health of others if you stop smoking:  Tobacco is harmful to nonsmokers who breathe in your secondhand smoke  The following are ways the health of others around you may improve when you stop smoking:  · You lower the risks for lung cancer and heart disease in nonsmoking adults  · If you are pregnant, you lower the risk for miscarriage, early delivery, low birth weight, and stillbirth  You also lower your baby's risk for SIDS, obesity, developmental delay, and neurobehavioral problems, such as ADHD  · If you have children, you lower their risk for ear infections, colds, pneumonia, bronchitis, and asthma  For more information and support to stop smoking:   · [employer]  Phone: 0- 950 - 687-0895  Web Address: www [employer]  Follow up with your healthcare provider as directed:  Write down your questions so you remember to ask them during your visits  © 2017 26[address] [facility] Information is for End User's use only and may not be sold, redistributed or otherwise used for commercial purposes  All illustrations and images included in CareNotes® are the copyrighted property of A D A M , Inc  or [employer]  The above information is an  only  It is not intended as medical advice for individual conditions or treatments  Talk to your doctor, nurse or pharmacist before following any medical regimen to see if it is safe and effective for you  Cigarette Smoking and Your Health, Ambulatory Care   GENERAL INFORMATION:   What are the risks to my health if I smoke? Chemicals in tobacco are addictive and damage every cell in your body  All tobacco products are dangerous to you and to nonsmokers who breathe your secondhand smoke   Even if you are a light smoker or a social smoker, you have an increased risk for cancer, heart disease, and lung disease  If you are pregnant or have diabetes, smoking increases your risk for complications  What are the benefits to my health if I stop smoking? · Lower risk of cancer, heart disease, blood clots, heart attack, and stroke    · Lower risk of diabetes complications, such as kidney, artery, or eye disease, and nerve damage that can result in amputations    · Lower risk of lung infections and diseases, such as pneumonia, asthma, chronic bronchitis, and emphysema           · Increased benefits of chemotherapy if you already have cancer, and decreased risk for cancer returning after treatment    · Improved circulation, allowing more oxygen to be delivered to your body    · Improved ability to heal and to fight infections  What are the benefits to the health of others if I stop smoking? · Lower risk of lung cancer and heart disease in nonsmokers    · Lower risk of miscarriage, early delivery, low birth weight, and stillbirth    · Lower risk of SIDS, obesity, developmental delay, ear infections, colds, pneumonia, bronchitis, asthma, and ADHD in your child  Where can I find more information and support to stop smoking? It is never too late to stop smoking  Ask your healthcare provider for more information if you need help  · "[employer]  Phone: 6- 554 - [phone]  Web Address: www [employer]  Follow up with your healthcare provider as directed:  Write down your questions so you remember to ask them during your visits  CARE AGREEMENT:   You have the right to help plan your care  Learn about your health condition and how it may be treated  Discuss treatment options with your caregivers to decide what care you want to receive  You always have the right to refuse treatment  The above information is an  only  It is not intended as medical advice for individual conditions or treatments   Talk to your doctor, nurse or pharmacist before following any medical regimen to see if it is safe and effective for you  © 2014 [address] is for End User's use only and may not be sold, redistributed or otherwise used for commercial purposes  All illustrations and images included in CareNotes® are the copyrighted property of [employer] D A M , Inc  or Michael Daugherty  How to Stop Smoking   WHAT YOU NEED TO KNOW:   You will improve your health and the health of others around you if you stop smoking  Your risk for heart and lung disease, cancer, stroke, heart attack, and vision problems will also decrease  You can benefit from quitting no matter how long you have smoked  DISCHARGE INSTRUCTIONS:   Prepare to stop smoking:  Nicotine is a highly addictive drug found in cigarettes  Withdrawal symptoms can happen when you stop smoking and make it hard to quit  These include anxiety, depression, irritability, trouble sleeping, and increased appetite  You increase your chances of success if you prepare to quit  · Set a quit date  Ananda Sargent a date that is within the next 2 weeks  Do not pick a day that you think may be stressful or busy  Write down the day or [place] it on your calender  · Tell friends and family that you plan to quit  Explain that you may have withdrawal symptoms when you try to quit  Ask them to support you  They may be able to encourage you and help reduce your stress to make it easier for you to quit  · Make a list of your reasons for quitting  Put the list somewhere you will see it every day, such as your refrigerator  You can look at the list when you have a craving  · Remove all tobacco and nicotine products from your home, car, and workplace  Also, remove anything else that will tempt you to smoke, such as lighters, matches, or ashtrays  Clean your car, home, and places at work that smell like smoke  The smell of smoke can trigger a craving  · Identify triggers that make you want to smoke  This may include activities, feelings, or people   Also write down 1 way you can deal with each of your triggers  For example, if you want to smoke as soon as you wake up, plan another activity during this time, such as exercise  · Make a plan for how you will quit  Learn about the tools that can help you quit, such as medicine, counseling, or nicotine replacement therapy  Choose at least 2 options to help you quit  Tools to help you stop smoking:   · Counseling  from a trained healthcare provider can provide you with support and skills to quit smoking  The provider will also teach you to manage your withdrawal symptoms and cravings  You may receive counseling from one counselor, in group therapy, or through phone therapy called a quit line  · Nicotine replacement therapy (NRT)  such as nicotine patches, gum, or lozenges may help reduce your nicotine cravings  You may get these without a doctor's order  Do not use e-cigarettes or smokeless tobacco in place of cigarettes or to help you quit  They still contain nicotine  · Prescription medicines  such as nasal sprays or nicotine inhalers may help reduce your withdrawal symptoms  Other medicines may also be used to reduce your urge to smoke  Ask your healthcare provider about these medicines  You may need to start certain medicines 2 weeks before your quit date for them to work well  · Hypnosis  is a practice that helps guide you through thoughts and feelings  Hypnosis may help decrease your cravings and make you more willing to quit  · Acupuncture therapy  uses very thin needles to balance energy channels in the body  This is thought to help decrease cravings and symptoms of nicotine withdrawal      · Support groups  let you talk to others who are trying to quit or have already quit  It may be helpful to speak with others about how they quit  Manage your cravings:   · Avoid situations, people, and places that tempt you to smoke  Go to nonsmoking places, such as libraries or restaurants   Understand what tempts you and try to avoid these things  · Keep your hands busy  Hold things such as a stress ball or pen  · Put candy or toothpicks in your mouth  Keep lollipops, sugarless gum, or toothpicks with you at all times  · Do not have alcohol or caffeine  These drinks may tempt you to smoke  Drink healthy liquids such as water or juice instead  · Reward yourself when you resist your cravings  Rewards will motivate you and help you stay positive  · Do an activity that distracts you from your craving  Examples include going for a walk, exercising, or cleaning  Prevent weight gain after you quit:  You may gain a few pounds after you quit smoking  It is healthier for you to gain a few pounds than to continue to smoke  The following can help you prevent weight gain:  · Eat healthy foods  These include fruits, vegetables, whole-grain breads, low-fat dairy products, beans, lean meats, and fish  Eat healthy snacks, such as low-fat yogurt, if you get hungry between meals  · Drink water before, during, and between meals  This will make your stomach feel full and help prevent you from overeating  Ask your healthcare provider how much liquid to drink each day and which liquids are best for you  · Exercise  Take a walk or do some kind of exercise every day  Ask your healthcare provider what exercise is right for you  This may help reduce your cravings and reduce stress  For support and more information:   · Smokefree  gov  Phone: 8- 136 - 795-4581  Web Address: www smokefree  gov  © 2017 [address] Information is for End User's use only and may not be sold, redistributed or otherwise used for commercial purposes  All illustrations and images included in CareNotes® are the copyrighted property of [employer]  or Michael Daugherty  The above information is an  only  It is not intended as medical advice for individual conditions or treatments   Talk to your doctor, nurse or pharmacist before following any medical regimen to see if it is safe and effective for you  How to Stop Smoking, Ambulatory Care   GENERAL INFORMATION:   Why you should stop smoking: You will improve your health and the health of others around you if you stop smoking  Your risk of heart and lung disease, cancer, stroke, heart attack, and vision problems will also decrease  You can benefit from quitting no matter how long you have smoked  Quitting may even prolong your life  Prepare to stop smoking:  Nicotine is a highly addictive drug found in cigarettes  Withdrawal symptoms can happen when you stop smoking and make it hard to quit  These include anxiety, depression, irritability, trouble sleeping, and increased appetite  You increase your chances of success if you prepare to quit  · Set a quit date  This will help confirm your decision to stop smoking  · Tell friends and family that you plan to quit  Explain that you may have withdrawal symptoms when you try to quit  Ask them to support you  They may be able to encourage you and help reduce your stress to make it easier for you to quit  · Expect it to be hard to quit, but know you can do it  Smoking is a daily habit that becomes part of your life  Know the triggers that tempt you to smoke, so you can break this habit  Write down a list of these challenges and have a plan to avoid them  · Remove all tobacco and nicotine products from your home, car, and workplace  Also, remove anything else that will tempt you to smoke, such as lighters, matches, or vishnu trays  Tools to help you stop smoking: You may be able to quit on your own, or you may need to try one or more of the following:  · Counseling  from trained caregivers will help teach you skills to quit smoking  They will also teach you to manage your withdrawal symptoms and cravings  You may receive counseling from one counselor, in group therapy, or through phone therapy called a quit line  · Nicotine replacement therapy (NRT)  such as nicotine patches, gum, or lozenges may help reduce your nicotine cravings and other withdrawal symptoms  You may get these without a doctor's order  · Prescription medicines  such as nasal sprays or nicotine inhalers may help reduce your withdrawal symptoms  Other medicines may also be used to reduce your urge to smoke  Ask your primary healthcare provider about these medicines  You may need to start certain medicines 2 weeks before your quit date for them to work well  Manage your cravings:   · Avoid situations, people, and places that tempt you to smoke  Go to nonsmoking places, such as libraries or restaurants  Understand what tempts you and try to avoid these things  · Keep your hands busy  Hold things such as a stress ball or pen  Keep lollipops, gum, or toothpicks in your mouth to distract you from your cravings  · Avoid alcohol and caffeine  These drinks may tempt you to smoke  Drink healthy liquids such as water or juice instead  · Reward yourself when you resist your cravings  Rewards will motivate you and help you stay positive  For more support and information:   · [employer]  Phone: 1- 268 - 290-7518  Web Address: www [employer]  CARE AGREEMENT:   You have the right to help plan your care  Learn about your health condition and how it may be treated  Discuss treatment options with your caregivers to decide what care you want to receive  You always have the right to refuse treatment  The above information is an  only  It is not intended as medical advice for individual conditions or treatments  Talk to your doctor, nurse or pharmacist before following any medical regimen to see if it is safe and effective for you  © 2014 [address] is for End User's use only and may not be sold, redistributed or otherwise used for commercial purposes   All illustrations and images included in [employer] are the copyrighted property of [employer]  or Michael Daugherty

## 2018-04-04 NOTE — PRE-PROCEDURE INSTRUCTIONS
Endo process reviewed with pt  Call bell in reach  Pt  Comfortable  Agrees to use call bell for assistance  Given wet sponge for mouth for dryness  Pt  Denies ever having gall bladder removed, as listed in H&P  Removed from nursing H&P  I will let Dr Ramirez Mom know

## 2018-04-04 NOTE — ANESTHESIA PREPROCEDURE EVALUATION
Review of Systems/Medical History  Patient summary reviewed  Chart reviewed  History of anesthetic complications     Cardiovascular   Pulmonary  Smoker cigarette smoker  ,        GI/Hepatic    GERD well controlled, Bariatric surgery,             Endo/Other  History of thyroid disease , hyperthyroidism,      GYN       Hematology  Negative hematology ROS      Musculoskeletal    Arthritis     Neurology    No neuromuscular disease , Headaches,    Psychology           Physical Exam    Airway    Mallampati score: I  TM Distance: >3 FB  Neck ROM: full     Dental   No notable dental hx     Cardiovascular  Rhythm: regular, Rate: normal, Cardiovascular exam normal    Pulmonary  Pulmonary exam normal Breath sounds clear to auscultation,     Other Findings        Anesthesia Plan  ASA Score- 2     Anesthesia Type- IV sedation with anesthesia with ASA Monitors  Additional Monitors:   Airway Plan:         Plan Factors-Patient not instructed to abstain from smoking on day of procedure  Patient did not smoke on day of surgery  Induction- intravenous  Postoperative Plan-     Informed Consent- Anesthetic plan and risks discussed with patient  I personally reviewed this patient with the CRNA  Discussed and agreed on the Anesthesia Plan with the CRNA  Jay Duvall

## 2018-04-04 NOTE — H&P
H&P EXAM - Outpatient Endoscopy  [facility] GI LAB INTRA   April Mirella Graham 57 y o  female MRN: [id number]  Unit/Bed#:  Encounter: [id number]        Impression: Morbid obesity with epigastric and RUQ pain s/p Darion en Y Gastric Bypass    Plan:Upper endoscopy rule out marginal ulcer    Chief Complaint: Morbid obesity and abdominal pain s/p RYGB    Physical Exam: Normal not in acute distress   Chest: Clear to auscultation   Heart: Normal S1 and S2

## 2018-06-14 ENCOUNTER — CLINICAL SUPPORT (OUTPATIENT)
Dept: FAMILY MEDICINE CLINIC | Facility: CLINIC | Age: 48
End: 2018-06-14
Payer: COMMERCIAL

## 2018-06-14 ENCOUNTER — TELEPHONE (OUTPATIENT)
Dept: FAMILY MEDICINE CLINIC | Facility: CLINIC | Age: 48
End: 2018-06-14

## 2018-06-14 DIAGNOSIS — Z23 NEED FOR TDAP VACCINATION: Primary | ICD-10-CM

## 2018-06-14 PROCEDURE — 90715 TDAP VACCINE 7 YRS/> IM: CPT | Performed by: FAMILY MEDICINE

## 2018-06-14 PROCEDURE — 90471 IMMUNIZATION ADMIN: CPT | Performed by: FAMILY MEDICINE

## 2018-06-14 NOTE — TELEPHONE ENCOUNTER
She can get a booster on the Adacel if she would like  We do not really worry about tetanus infection in the [place] since there is an extremely low to no incidence of that goes we have all had are booster shots over the years  Generally with lacerations we worry about infection from skin bacteria  Regular soap and water to clean the area and can cover with antibiotic ointment  So okay to schedule her on the nurse schedule for updated Adacel

## 2018-06-14 NOTE — TELEPHONE ENCOUNTER
Patient called and stated she was cutting open her lobster last night and accidentally cut her finger  She is leaving for [place] soon and wants to know if she can get a tetanus shot just in case she would get an infection

## 2018-06-14 NOTE — PROGRESS NOTES
Pt cut herself last night while opening her lobster, per Dr Vijay Lewis it is okay for pt to receive adacel  Pt here today for shot which was administered into JOHN

## 2018-07-13 ENCOUNTER — TELEPHONE (OUTPATIENT)
Dept: BARIATRICS | Facility: CLINIC | Age: 48
End: 2018-07-13

## 2018-08-17 ENCOUNTER — OFFICE VISIT (OUTPATIENT)
Dept: FAMILY MEDICINE CLINIC | Facility: CLINIC | Age: 48
End: 2018-08-17
Payer: COMMERCIAL

## 2018-08-17 ENCOUNTER — TELEPHONE (OUTPATIENT)
Dept: FAMILY MEDICINE CLINIC | Facility: CLINIC | Age: 48
End: 2018-08-17

## 2018-08-17 VITALS
DIASTOLIC BLOOD PRESSURE: 86 MMHG | SYSTOLIC BLOOD PRESSURE: 128 MMHG | BODY MASS INDEX: 28.71 KG/M2 | WEIGHT: 156 LBS | HEIGHT: 62 IN

## 2018-08-17 DIAGNOSIS — R51.9 NONINTRACTABLE HEADACHE, UNSPECIFIED CHRONICITY PATTERN, UNSPECIFIED HEADACHE TYPE: ICD-10-CM

## 2018-08-17 DIAGNOSIS — E55.9 VITAMIN D DEFICIENCY: Primary | ICD-10-CM

## 2018-08-17 DIAGNOSIS — I10 ESSENTIAL HYPERTENSION: ICD-10-CM

## 2018-08-17 DIAGNOSIS — E66.3 OVERWEIGHT (BMI 25.0-29.9): ICD-10-CM

## 2018-08-17 DIAGNOSIS — F43.9 STRESS: ICD-10-CM

## 2018-08-17 DIAGNOSIS — Z11.4 SCREENING FOR HIV (HUMAN IMMUNODEFICIENCY VIRUS): Primary | ICD-10-CM

## 2018-08-17 PROCEDURE — 99214 OFFICE O/P EST MOD 30 MIN: CPT | Performed by: FAMILY MEDICINE

## 2018-08-17 PROCEDURE — 3079F DIAST BP 80-89 MM HG: CPT | Performed by: FAMILY MEDICINE

## 2018-08-17 PROCEDURE — 3074F SYST BP LT 130 MM HG: CPT | Performed by: FAMILY MEDICINE

## 2018-08-17 PROCEDURE — 3008F BODY MASS INDEX DOCD: CPT | Performed by: FAMILY MEDICINE

## 2018-08-17 RX ORDER — SUMATRIPTAN 100 MG/1
50 TABLET, FILM COATED ORAL ONCE AS NEEDED
Qty: 9 TABLET | Refills: 3 | Status: SHIPPED | OUTPATIENT
Start: 2018-08-17 | End: 2020-12-02

## 2018-08-17 RX ORDER — LOSARTAN POTASSIUM 50 MG/1
50 TABLET ORAL DAILY
Qty: 30 TABLET | Refills: 5 | Status: SHIPPED | OUTPATIENT
Start: 2018-08-17 | End: 2018-10-17 | Stop reason: SDUPTHER

## 2018-08-17 RX ORDER — SUMATRIPTAN 50 MG/1
50 TABLET, FILM COATED ORAL ONCE AS NEEDED
Qty: 9 TABLET | Refills: 3 | Status: SHIPPED | OUTPATIENT
Start: 2018-08-17 | End: 2018-08-17 | Stop reason: SDUPTHER

## 2018-08-17 RX ORDER — LISINOPRIL 10 MG/1
TABLET ORAL
COMMUNITY
Start: 2018-08-16 | End: 2018-08-17

## 2018-08-17 NOTE — TELEPHONE ENCOUNTER
Patient called, she is at [facility], sumatriptan 50 mg is on back order, can we please send in sumatriptan 100 mg for her to break in half?   Thanks

## 2018-08-17 NOTE — LETTER
August 17, 2018     Patient: Syed Adames   YOB: 1970   Date of Visit: 8/17/2018       To Whom it May Concern:    April Susan Hunt is under my professional care  She was seen in my office on 8/17/2018  She may return to work on 08/20/2018  If you have any questions or concerns, please don't hesitate to call           Sincerely,          Henry Lopez DO        CC: No Recipients

## 2018-08-17 NOTE — PROGRESS NOTES
Assessment/Plan:  Chief Complaint   Patient presents with    Follow-up     Went to ER on Wednesday for chest tightness, pain and tingling of the L hand  All labs and ekg were normal      Headache     having a headache since Monday night  Taking tylenol without impovement  Patient Instructions   Here for s/p ER and will need change of her BP med and woul like a different BP med and will try losartan as directed and has anxiety issues and discussed medication such as Citalopram in the future and will call us if interested  Trial of imitrex for headaches  Recheck BP in 1 month  No problem-specific Assessment & Plan notes found for this encounter  Diagnoses and all orders for this visit:    Screening for HIV (human immunodeficiency virus)  -     HIV 1/2 AG-AB combo; Future    Essential hypertension  -     losartan (COZAAR) 50 mg tablet; Take 1 tablet (50 mg total) by mouth daily    Nonintractable headache, unspecified chronicity pattern, unspecified headache type  -     SUMAtriptan (IMITREX) 50 mg tablet; Take 1 tablet (50 mg total) by mouth once as needed for migraine for up to 1 dose    Overweight (BMI 25 0-29  9)    Stress    Other orders  -     Discontinue: lisinopril (ZESTRIL) 10 mg tablet;           Subjective:      Patient ID: April L Neema Ray is a 57 y o  female  Follow-up (Went to ER on Wednesday for chest tightness, pain and tingling of the L hand  All labs and ekg were normal  )  Headache (having a headache since Monday night  Taking tylenol without impovement  )    Has some insomnia and gerd  She has a hard time sleeping and may have some anxiety  Has a lot of stress  She takes care of her grandmother and relationship issues         Headache          The following portions of the patient's history were reviewed and updated as appropriate: allergies, current medications, past family history, past medical history, past social history, past surgical history and problem list     Review of Systems   Constitutional:        Overweight   HENT: Negative  Eyes: Negative  Respiratory: Negative  Cardiovascular: Negative  Gastrointestinal: Negative  Endocrine: Negative  Genitourinary: Negative  Musculoskeletal: Negative  Skin: Negative  Allergic/Immunologic: Negative  Neurological: Positive for headaches  Hematological: Negative  Psychiatric/Behavioral: Negative  Objective:      /86   Ht 5' 2" (1 575 m)   Wt 70 8 kg (156 lb)   BMI 28 53 kg/m²          Physical Exam   Constitutional: She is oriented to person, place, and time  She appears well-developed and well-nourished  overweight   HENT:   Head: Normocephalic and atraumatic  Right Ear: External ear normal    Left Ear: External ear normal    Nose: Nose normal    Mouth/Throat: Oropharynx is clear and moist    Eyes: Conjunctivae and EOM are normal  Pupils are equal, round, and reactive to light  Neck: Normal range of motion  Neck supple  Cardiovascular: Normal rate, regular rhythm, normal heart sounds and intact distal pulses  Pulmonary/Chest: Effort normal and breath sounds normal    Musculoskeletal: Normal range of motion  Neurological: She is alert and oriented to person, place, and time  She has normal reflexes  Skin: Skin is warm and dry  Psychiatric: She has a normal mood and affect   Her behavior is normal    anxiety

## 2018-08-17 NOTE — TELEPHONE ENCOUNTER
Patient stopped at check out and wanted to know if you can see if shes due for any bw, bozena her vit d      States we can mail script to her

## 2018-08-24 ENCOUNTER — TRANSITIONAL CARE MANAGEMENT (OUTPATIENT)
Dept: FAMILY MEDICINE CLINIC | Facility: CLINIC | Age: 48
End: 2018-08-24

## 2018-08-24 RX ORDER — HYDROCODONE BITARTRATE AND ACETAMINOPHEN 5; 325 MG/1; MG/1
1-2 TABLET ORAL EVERY 6 HOURS
COMMUNITY
Start: 2018-08-23 | End: 2021-10-18

## 2018-08-24 RX ORDER — METRONIDAZOLE 500 MG/1
500 TABLET ORAL
COMMUNITY
Start: 2018-08-23 | End: 2018-08-30

## 2018-08-24 RX ORDER — LEVOFLOXACIN 750 MG/1
750 TABLET ORAL
COMMUNITY
Start: 2018-08-23 | End: 2018-08-30

## 2018-08-24 NOTE — TELEPHONE ENCOUNTER
According to the [place] drug monitoring site the patient was given #24 tablets picked up yesterday  This should have been enough for a least over the weekend?

## 2018-08-24 NOTE — TELEPHONE ENCOUNTER
Spoke with pt's s/o re abx and post op pain management  She states she spoke with them re hydro-ace and will not fill that further, recommended tylenol for pain  Simone Gibson was advised she should have pt follow surgeon's protocols with that since the surgeon had started her on it, she still wants to verify that you will not prescribe hydrocodone-acetaminophen  She is going to call surgeon regarding Gianfranco Rooney

## 2018-08-24 NOTE — TELEPHONE ENCOUNTER
Pt's s/o called re TCM, communication and elyse completed  She states pt has been having side effects to the levaquin 750 mg such as vivid dreams/hallucinations, she is asking if a replacement could be sent to her pharmacy  She is also running out of hydrocodone-ace 5-325 mg  They would like rx's sent to [facility] [address]  Please advise

## 2018-08-24 NOTE — TELEPHONE ENCOUNTER
Patient is status post surgery for gallbladder removal and acute cholecystitis  She really needs to call the surgeon for change in the antibiotics since it was started by the surgeon    She also was given the postoperative pain medication by the surgeon should she needs to ask him for a refill on the pain medications

## 2018-08-24 NOTE — TELEPHONE ENCOUNTER
Per Dr Coughlin verbal pt s/o advised to call Monday with an update re pt pain; explained nature of medication  She understood and will give an update then

## 2018-08-24 NOTE — TELEPHONE ENCOUNTER
Pt s/o states she has been taking rx as prescribed, she is in a lot of pain and doesn't know what she should do once the medication is used up after the weekend  She was advised to follow the surgeon's protocol re pain med  She would like to know what else she can do since April will not be seen by anyone until next week and may be in more pain  Please advise

## 2018-10-17 DIAGNOSIS — I10 ESSENTIAL HYPERTENSION: ICD-10-CM

## 2018-10-17 RX ORDER — LOSARTAN POTASSIUM 50 MG/1
50 TABLET ORAL DAILY
Qty: 90 TABLET | Refills: 0 | OUTPATIENT
Start: 2018-10-17

## 2018-10-17 RX ORDER — LOSARTAN POTASSIUM 50 MG/1
50 TABLET ORAL DAILY
Qty: 30 TABLET | Refills: 3 | Status: SHIPPED | OUTPATIENT
Start: 2018-10-17 | End: 2019-04-02 | Stop reason: SDUPTHER

## 2018-10-17 NOTE — TELEPHONE ENCOUNTER
Patient needs a 90 day supply to [address]    This was prescribed by you, thanks  Please sign/auth Rx, thanks

## 2019-01-16 ENCOUNTER — TELEPHONE (OUTPATIENT)
Dept: FAMILY MEDICINE CLINIC | Facility: CLINIC | Age: 49
End: 2019-01-16

## 2019-01-16 ENCOUNTER — OFFICE VISIT (OUTPATIENT)
Dept: FAMILY MEDICINE CLINIC | Facility: CLINIC | Age: 49
End: 2019-01-16
Payer: COMMERCIAL

## 2019-01-16 VITALS
SYSTOLIC BLOOD PRESSURE: 128 MMHG | DIASTOLIC BLOOD PRESSURE: 78 MMHG | HEIGHT: 62 IN | WEIGHT: 151 LBS | BODY MASS INDEX: 27.79 KG/M2

## 2019-01-16 DIAGNOSIS — K91.5 POST-CHOLECYSTECTOMY SYNDROME: ICD-10-CM

## 2019-01-16 DIAGNOSIS — K52.9 GASTROENTERITIS: Primary | ICD-10-CM

## 2019-01-16 PROBLEM — M75.92: Status: ACTIVE | Noted: 2017-05-05

## 2019-01-16 PROBLEM — Z72.0 TOBACCO USE: Status: ACTIVE | Noted: 2018-08-15

## 2019-01-16 PROBLEM — M75.112 INCOMPLETE TEAR OF LEFT ROTATOR CUFF: Status: ACTIVE | Noted: 2017-05-05

## 2019-01-16 PROBLEM — S43.429A SPRAIN OF ROTATOR CUFF CAPSULE: Status: ACTIVE | Noted: 2019-01-16

## 2019-01-16 PROCEDURE — 99214 OFFICE O/P EST MOD 30 MIN: CPT | Performed by: FAMILY MEDICINE

## 2019-01-16 PROCEDURE — 3008F BODY MASS INDEX DOCD: CPT | Performed by: FAMILY MEDICINE

## 2019-01-16 RX ORDER — CHOLESTYRAMINE 4 G/9G
1 POWDER, FOR SUSPENSION ORAL
Qty: 60 PACKET | Refills: 0 | Status: SHIPPED | OUTPATIENT
Start: 2019-01-16 | End: 2020-12-02

## 2019-01-16 NOTE — TELEPHONE ENCOUNTER
The  from the patients work place called asking if the patient's illness is something that would be covered under FMLA  She stated if it was the flu, it would be covered, but if it is something that is seasonal, it would not be

## 2019-01-16 NOTE — LETTER
January 16, 2019     Patient: Imelda Metz   YOB: 1970   Date of Visit: 1/16/2019       To Whom it May Concern:    Echo Cole is under my professional care  She was seen in my office on 1/16/2019  She may return to work on 01/21/2019  If you have any questions or concerns, please don't hesitate to call           Sincerely,          Genesis Anglin DO        CC: No Recipients

## 2019-01-16 NOTE — PROGRESS NOTES
Assessment/Plan:   Diagnoses and all orders for this visit:    Gastroenteritis    Post-cholecystectomy syndrome  -     cholestyramine (QUESTRAN) 4 g packet; Take 1 packet (4 g total) by mouth 3 (three) times a day with meals        Discussed the probability of either food-borne gastroenteritis versus viral gastroenteritis  The treatment is the same and is basically conservative  She is improving from the vomiting standpoint  She is at risk for post cholecystectomy syndrome and I discussed the use of Questran  Patient is aware of clear liquids for another week and avoidance of dairy for that length of time  BRAT  diet gradually increasing as tolerated  Time spent greater than 25 min with greater than 50% counseling performed  Subjective:   Chief Complaint   Patient presents with    Vomiting     started Sunday had fever, but now broke    Diarrhea     Started Sunday  Patient ID: April L Tejal Caal is a 57 y o  female  Patient is a pleasant 53-year-old patient who is status post bariatric surgery several years ago and recent gallbladder surgery several months ago  She relates that she was out to dinner and had crab cakes  She did fill well immediately after  The next morning she did have episode vomiting and fever  Today she is tolerating some clear liquids  She then developed some diarrhea also  There is no acute abdominal pain  Vomiting    This is a new problem  The current episode started in the past 7 days  The problem occurs 2 to 4 times per day  The problem has been waxing and waning  The emesis has an appearance of stomach contents  The maximum temperature recorded prior to her arrival was 101 - 101 9 F  The fever has been present for less than 1 day  Associated symptoms include chills and diarrhea  Pertinent negatives include no abdominal pain, headaches or weight loss  Risk factors include suspect food intake  She has tried diet change and bed rest for the symptoms   The treatment provided mild relief  Diarrhea    This is a new problem  The current episode started in the past 7 days  The problem occurs 2 to 4 times per day  The problem has been waxing and waning  The stool consistency is described as mucous  Associated symptoms include chills and vomiting  Pertinent negatives include no abdominal pain, headaches or weight loss  The symptoms are aggravated by dairy products  Risk factors include suspect food intake  Recent abdominal surgery: Gastric bypass remotely and recent gallbladder surgery several months ago  Patient probably up-to-date on ongoing issues with work  Patient is doing well with diminished alcohol consumption  The following portions of the patient's history were reviewed and updated as appropriate: allergies, current medications, past family history, past medical history, past social history, past surgical history and problem list     Review of Systems   Constitutional: Positive for chills  Negative for weight loss  Gastrointestinal: Positive for diarrhea and vomiting  Negative for abdominal pain  Neurological: Negative for headaches  Objective:      /78   Ht 5' 2" (1 575 m)   Wt 68 5 kg (151 lb)   BMI 27 62 kg/m²          Physical Exam   Constitutional: She is oriented to person, place, and time  She appears well-developed and well-nourished  Pleasant 62-year-old female who is slightly uncomfortable but appears hydrated  HENT:   Head: Normocephalic and atraumatic  Eyes: Pupils are equal, round, and reactive to light  EOM are normal    Neck: Normal range of motion  No thyromegaly present  Cardiovascular: Normal rate and intact distal pulses  Pulmonary/Chest: Effort normal and breath sounds normal    Abdominal: Bowel sounds are normal  She exhibits no mass  Tenderness: Mild in epigastric  There is no rebound and no guarding  Lymphadenopathy:     She has no cervical adenopathy     Neurological: She is alert and oriented to person, place, and time    Psychiatric: She has a normal mood and affect   Her behavior is normal  Judgment and thought content normal

## 2019-01-17 NOTE — TELEPHONE ENCOUNTER
Called administrative office back and they had spoken with the patient already so they were aware prior to my call

## 2019-01-17 NOTE — TELEPHONE ENCOUNTER
The diagnosis was the stomach flu, so that is what we can relate to be administrative office and/or the patient

## 2019-01-23 ENCOUNTER — TELEPHONE (OUTPATIENT)
Dept: FAMILY MEDICINE CLINIC | Facility: CLINIC | Age: 49
End: 2019-01-23

## 2019-01-23 NOTE — TELEPHONE ENCOUNTER
She is not quite the right age for screening with regards to the vascular studies  Does she know what kind of stroke those family members had? There is definitely a difference between different types of strokes and what they arise from

## 2019-01-23 NOTE — TELEPHONE ENCOUNTER
Patient called and would like to know if there is some sort of test that can be done to see if she is prone to having a stroke, due to a few family members having one  Please advise

## 2019-01-24 NOTE — TELEPHONE ENCOUNTER
Patient is unsure of the types of strokes they (brother, grandmother, father) had but will get back to us once she finds out

## 2019-01-27 NOTE — OP NOTE
OPERATIVE REPORT  PATIENT NAME: April L Lew Ellis    :  1970  MRN: [id number]  Pt Location: [facility] GI ROOM 01    SURGERY DATE: 2018    Surgeon(s) and Role:     * Park Temple MD - Primary    Preop Diagnosis:  Bariatric surgery status [Z98 84]    Post-Op Diagnosis Codes: * Bariatric surgery status [Z98 84]    Procedure(s) (LRB):  ESOPHAGOGASTRODUODENOSCOPY (EGD) (N/A)    Specimen(s):  * No specimens in log *    Estimated Blood Loss:   Minimal    Drains:       Anesthesia Type:   IV Sedation with Anesthesia    Operative Indications:  Bariatric surgery status [Z98 84]      Operative Findings:  Severe pouchitis  No evidence of marginal ulceration    Complications:   None    Procedure and Technique:  Upper endoscopy   I was present for the entire procedure    Patient Disposition:  hemodynamically stable     Indication:   Patient suffers from morbid obesity s/p RYGB presenting with epigastric and RUQ pain    Description of the procedure: The patient was brought to the endoscopy suite and was placed in  left lateral decubitus position  A time-out was called and the patient was identified by name and by armband  The patient received IV  Propofol under closed monitoring  by the anesthesiologist and nurse anesthesist     Upper endoscopy was performed under direct visualization  Esophagus was visualized and showed normal findings   The GE junction was normal   The stomach pouch was then inspected and showed severe pouchitis  Gastrojejunostomy was inspected and showed no evidence of ulceration    Blind end and Darion limbs were both inspected and showed normal findings    Gastro-gastric Fistula was not identified      The patient tolerated the procedure well and was transferred to the recovery unit in stable condition           SIGNATURE: Park Temple MD  DATE: 2018  TIME: 9:40 AM
vital signs/nurses notes

## 2019-04-02 DIAGNOSIS — I10 ESSENTIAL HYPERTENSION: ICD-10-CM

## 2019-04-02 RX ORDER — LOSARTAN POTASSIUM 50 MG/1
50 TABLET ORAL DAILY
Qty: 90 TABLET | Refills: 3 | Status: SHIPPED | OUTPATIENT
Start: 2019-04-02 | End: 2019-06-27 | Stop reason: SDUPTHER

## 2019-04-02 RX ORDER — LOSARTAN POTASSIUM 50 MG/1
50 TABLET ORAL DAILY
Qty: 90 TABLET | Refills: 3 | Status: SHIPPED | OUTPATIENT
Start: 2019-04-02 | End: 2019-04-02 | Stop reason: SDUPTHER

## 2019-06-27 DIAGNOSIS — I10 ESSENTIAL HYPERTENSION: ICD-10-CM

## 2019-06-27 RX ORDER — LOSARTAN POTASSIUM 50 MG/1
50 TABLET ORAL DAILY
Qty: 90 TABLET | Refills: 0 | Status: SHIPPED | OUTPATIENT
Start: 2019-06-27 | End: 2019-08-14 | Stop reason: SDUPTHER

## 2019-07-31 ENCOUNTER — TELEPHONE (OUTPATIENT)
Dept: FAMILY MEDICINE CLINIC | Facility: CLINIC | Age: 49
End: 2019-07-31

## 2019-07-31 NOTE — TELEPHONE ENCOUNTER
I can check her blood pressure this afternoon   I am coming back to the office I will be there by 1:30 PM

## 2019-07-31 NOTE — TELEPHONE ENCOUNTER
Patient's medication is being adjusted today  She was told to be on it for a week then see you  Can you squeeze her in your schedule the week of August 12th? She only wants to see you      #[phone]

## 2019-07-31 NOTE — TELEPHONE ENCOUNTER
Patient called and stated she is currently sitting in the hospital due to elevated BP  States her BP is 178/100ish  They did a CT scan and bloodwork and everything came back normal  She is scheduled with Dr Trenton Winn this afternoon but she would prefer to see you  I did make her aware that you are only here part of the day today and you are already booked but she insisted that I still send a note to you to see if you would see her

## 2019-08-02 NOTE — TELEPHONE ENCOUNTER
Is the patient currently scheduled at 3:00 p m  for Wednesday August 14th actually going to be making that appointment? I believe that patient is no longer in [place]? Can we find out?   If that current patient is not keeping the 3:00 p m  appointment then we can put April in that spot

## 2019-08-06 NOTE — TELEPHONE ENCOUNTER
Mailbox full, unable to leave message  I did put patient in schedule for 3 PM 8/14/19  Will try again later

## 2019-08-14 ENCOUNTER — OFFICE VISIT (OUTPATIENT)
Dept: FAMILY MEDICINE CLINIC | Facility: CLINIC | Age: 49
End: 2019-08-14
Payer: COMMERCIAL

## 2019-08-14 VITALS — OXYGEN SATURATION: 98 % | BODY MASS INDEX: 26.68 KG/M2 | WEIGHT: 145 LBS | HEIGHT: 62 IN | HEART RATE: 79 BPM

## 2019-08-14 DIAGNOSIS — I10 ESSENTIAL HYPERTENSION: Primary | ICD-10-CM

## 2019-08-14 DIAGNOSIS — Z98.84 BARIATRIC SURGERY STATUS: ICD-10-CM

## 2019-08-14 PROBLEM — F10.10 ALCOHOL ABUSE: Status: ACTIVE | Noted: 2018-08-20

## 2019-08-14 PROCEDURE — 3008F BODY MASS INDEX DOCD: CPT | Performed by: FAMILY MEDICINE

## 2019-08-14 PROCEDURE — 99214 OFFICE O/P EST MOD 30 MIN: CPT | Performed by: FAMILY MEDICINE

## 2019-08-14 RX ORDER — ASPIRIN 81 MG/1
81 TABLET ORAL DAILY
COMMUNITY
End: 2021-10-18

## 2019-08-14 RX ORDER — LOSARTAN POTASSIUM 100 MG/1
100 TABLET ORAL DAILY
Qty: 90 TABLET | Refills: 3 | Status: SHIPPED | OUTPATIENT
Start: 2019-08-14 | End: 2019-11-08 | Stop reason: SDUPTHER

## 2019-08-14 RX ORDER — AMLODIPINE BESYLATE 2.5 MG/1
2.5 TABLET ORAL DAILY
Qty: 90 TABLET | Refills: 3 | Status: SHIPPED | OUTPATIENT
Start: 2019-08-14 | End: 2019-08-14

## 2019-08-14 RX ORDER — AMLODIPINE BESYLATE 2.5 MG/1
2.5 TABLET ORAL DAILY
Qty: 90 TABLET | Refills: 3 | Status: SHIPPED | OUTPATIENT
Start: 2019-08-14 | End: 2019-11-08 | Stop reason: SDUPTHER

## 2019-08-14 RX ORDER — LOSARTAN POTASSIUM 100 MG/1
100 TABLET ORAL DAILY
Qty: 90 TABLET | Refills: 3 | Status: SHIPPED | OUTPATIENT
Start: 2019-08-14 | End: 2019-08-14 | Stop reason: SDUPTHER

## 2019-08-14 RX ORDER — AMLODIPINE BESYLATE 5 MG/1
1 TABLET ORAL DAILY
COMMUNITY
Start: 2019-07-31 | End: 2019-08-14

## 2019-08-14 RX ORDER — LOSARTAN POTASSIUM 50 MG/1
100 TABLET ORAL DAILY
Qty: 90 TABLET | Refills: 3 | Status: SHIPPED | OUTPATIENT
Start: 2019-08-14 | End: 2019-08-14 | Stop reason: SDUPTHER

## 2019-08-14 NOTE — PROGRESS NOTES
Assessment/Plan:     Diagnoses and all orders for this visit:    Essential hypertension  -     Discontinue: losartan (COZAAR) 50 mg tablet; Take 2 tablets (100 mg total) by mouth daily  -     Discontinue: losartan (COZAAR) 100 MG tablet; Take 1 tablet (100 mg total) by mouth daily  -     Discontinue: amLODIPine (NORVASC) 2 5 mg tablet; Take 1 tablet (2 5 mg total) by mouth daily  -     amLODIPine (NORVASC) 2 5 mg tablet; Take 1 tablet (2 5 mg total) by mouth daily  -     losartan (COZAAR) 100 MG tablet; Take 1 tablet (100 mg total) by mouth daily    Bariatric surgery status  -     Iron, TIBC and Ferritin Panel; Future  -     Vitamin B12; Future  -     Vitamin D 25 hydroxy; Future    Other orders  -     aspirin (ECOTRIN LOW STRENGTH) 81 mg EC tablet; Take 81 mg by mouth daily  -     Discontinue: amLODIPine (NORVASC) 5 mg tablet; Take 1 tablet by mouth daily        Patient will continue to pursue fitness and dietary compliance to achieve optimal BMI  Lab work with regards to status post bariatric surgery will be obtained  Patient will follow up 3 months for blood pressure checkup  Influenza vaccination in the fall  Time spent in review of hospital records, diagnostic and history physical 25 minutes with greater than 50% counseling performed  Subjective:   Chief Complaint   Patient presents with    Follow-up     Here for emergency department follow up of her BP, medications were changed  Patient ID: April L Usama Richardson is a 54 y o  female  Patient is here for follow-up for emergency room where she is evaluated for symptomatic blood pressure  Losartan was increased to 100 mg and amlodipine was added at 5 mg  She is concerned of the side effect of edema and since blood pressure is improved today will recommend 2 5 mg  Blood work was done in the emergency room but patient still needs additional with iron studies vitamin B12 and D based on bariatric history  Hypertension   This is a chronic problem   The current episode started more than 1 year ago  The problem has been rapidly improving since onset  The problem is controlled  Pertinent negatives include no chest pain or shortness of breath  Headaches:  resolved  There are no associated agents to hypertension  Risk factors for coronary artery disease include family history  Past treatments include angiotensin blockers and calcium channel blockers  The current treatment provides significant improvement  Labs in the ER showed normal troponin x3  Lipid panel was at goal CBC and Chem unremarkable  Patient presented with emergency room with TIA like symptoms but this was ruled out completely  She had some left-sided tingling numbness and headaches in addition to the accelerated blood pressure found in the ER  CT scan head was unremarkable MRA and MRI subsequently done and also negative  She was started on aspirin  Patient is back to work no issues  The following portions of the patient's history were reviewed and updated as appropriate: allergies, current medications, past family history, past medical history, past social history, past surgical history and problem list     Review of Systems   Constitutional: Positive for unexpected weight change (Patient pursuing dietary measures and is down 5 lb from January  )  HENT: Negative  Respiratory: Negative for cough and shortness of breath  Cardiovascular: Negative for chest pain and leg swelling  Gastrointestinal: Negative for abdominal distention  Genitourinary: Negative  Musculoskeletal: Positive for arthralgias  Neurological: Numbness:  resolved  Headaches:  resolved  Psychiatric/Behavioral: Negative  Objective:      Pulse 79   Ht 5' 2" (1 575 m)   Wt 65 8 kg (145 lb)   SpO2 98%   BMI 26 52 kg/m²          Physical Exam   Constitutional: She appears well-developed and well-nourished     Pleasant 63-year-old female who appeared stated age with a BMI of 26%   HENT:   Mouth/Throat: Oropharynx is clear and moist    Eyes: Pupils are equal, round, and reactive to light  Cardiovascular: Normal rate, regular rhythm, normal heart sounds and intact distal pulses  Pulmonary/Chest: Effort normal and breath sounds normal    Abdominal: Soft  Bowel sounds are normal    Musculoskeletal: Normal range of motion  Psychiatric: She has a normal mood and affect  Her behavior is normal  Judgment and thought content normal    Vitals reviewed

## 2019-11-08 DIAGNOSIS — I10 ESSENTIAL HYPERTENSION: ICD-10-CM

## 2019-11-08 RX ORDER — AMLODIPINE BESYLATE 2.5 MG/1
2.5 TABLET ORAL DAILY
Qty: 90 TABLET | Refills: 3 | Status: SHIPPED | OUTPATIENT
Start: 2019-11-08 | End: 2019-12-02 | Stop reason: SDUPTHER

## 2019-11-08 RX ORDER — LOSARTAN POTASSIUM 100 MG/1
100 TABLET ORAL DAILY
Qty: 90 TABLET | Refills: 3 | Status: SHIPPED | OUTPATIENT
Start: 2019-11-08 | End: 2019-12-02 | Stop reason: SDUPTHER

## 2019-12-02 DIAGNOSIS — I10 ESSENTIAL HYPERTENSION: ICD-10-CM

## 2019-12-02 RX ORDER — LOSARTAN POTASSIUM 100 MG/1
100 TABLET ORAL DAILY
Qty: 7 TABLET | Refills: 0 | Status: SHIPPED | OUTPATIENT
Start: 2019-12-02 | End: 2020-03-31 | Stop reason: SDUPTHER

## 2019-12-02 RX ORDER — AMLODIPINE BESYLATE 2.5 MG/1
2.5 TABLET ORAL DAILY
Qty: 7 TABLET | Refills: 0 | Status: SHIPPED | OUTPATIENT
Start: 2019-12-02 | End: 2020-03-31 | Stop reason: SDUPTHER

## 2019-12-04 ENCOUNTER — TELEPHONE (OUTPATIENT)
Dept: FAMILY MEDICINE CLINIC | Facility: CLINIC | Age: 49
End: 2019-12-04

## 2019-12-04 DIAGNOSIS — Z12.31 SCREENING MAMMOGRAM, ENCOUNTER FOR: Primary | ICD-10-CM

## 2019-12-04 RX ORDER — LOSARTAN POTASSIUM 50 MG/1
TABLET ORAL
Qty: 90 TABLET | Refills: 1 | Status: SHIPPED | OUTPATIENT
Start: 2019-12-04 | End: 2019-12-11 | Stop reason: DRUGHIGH

## 2019-12-04 NOTE — TELEPHONE ENCOUNTER
----- Message from Feliz Stuart DO sent at [id number]/0/7760  1:02 PM EST -----  Regarding:  mammogram overdue   Please send correspondence about updating mammogram with requisition slip included

## 2019-12-04 NOTE — TELEPHONE ENCOUNTER
----- Message from Jose Gold DO sent at 00/3/9367  1:04 PM EST -----  Regarding:  Pap is also due   Pap is also due  She is to see  Rosibel Frank who has since retired so there is a new provider in that same office    Please direct patient to schedule gyn

## 2019-12-11 ENCOUNTER — TELEPHONE (OUTPATIENT)
Dept: FAMILY MEDICINE CLINIC | Facility: CLINIC | Age: 49
End: 2019-12-11

## 2019-12-11 NOTE — TELEPHONE ENCOUNTER
Em from [facility] called and is wondering if pt is supposed to be on Losartan 100 mg daily and Losartan 50 mg daily  They have a script for both  Please advise   Thanks       [phone] ref# [id number]

## 2019-12-17 DIAGNOSIS — Z12.31 ENCOUNTER FOR SCREENING MAMMOGRAM FOR MALIGNANT NEOPLASM OF BREAST: Primary | ICD-10-CM

## 2019-12-30 ENCOUNTER — ANNUAL EXAM (OUTPATIENT)
Dept: GYNECOLOGY | Facility: CLINIC | Age: 49
End: 2019-12-30
Payer: COMMERCIAL

## 2019-12-30 VITALS
HEART RATE: 65 BPM | HEIGHT: 62 IN | BODY MASS INDEX: 27.23 KG/M2 | WEIGHT: 148 LBS | SYSTOLIC BLOOD PRESSURE: 118 MMHG | DIASTOLIC BLOOD PRESSURE: 82 MMHG

## 2019-12-30 DIAGNOSIS — Z01.419 ENCOUNTER FOR GYNECOLOGICAL EXAMINATION WITH PAPANICOLAOU SMEAR OF CERVIX: ICD-10-CM

## 2019-12-30 DIAGNOSIS — Z01.419 ENCOUNTER FOR GYNECOLOGICAL EXAMINATION (GENERAL) (ROUTINE) WITHOUT ABNORMAL FINDINGS: Primary | ICD-10-CM

## 2019-12-30 PROCEDURE — S0612 ANNUAL GYNECOLOGICAL EXAMINA: HCPCS | Performed by: OBSTETRICS & GYNECOLOGY

## 2019-12-30 PROCEDURE — 88141 CYTOPATH C/V INTERPRET: CPT | Performed by: PATHOLOGY

## 2019-12-30 PROCEDURE — 87624 HPV HI-RISK TYP POOLED RSLT: CPT | Performed by: OBSTETRICS & GYNECOLOGY

## 2019-12-30 PROCEDURE — G0145 SCR C/V CYTO,THINLAYER,RESCR: HCPCS | Performed by: PATHOLOGY

## 2019-12-31 LAB
HPV HR 12 DNA CVX QL NAA+PROBE: NEGATIVE
HPV16 DNA CVX QL NAA+PROBE: NEGATIVE
HPV18 DNA CVX QL NAA+PROBE: NEGATIVE

## 2020-01-03 ENCOUNTER — TELEPHONE (OUTPATIENT)
Dept: GYNECOLOGY | Facility: CLINIC | Age: 50
End: 2020-01-03

## 2020-01-03 LAB
LAB AP GYN PRIMARY INTERPRETATION: ABNORMAL
Lab: ABNORMAL
PATH INTERP SPEC-IMP: ABNORMAL

## 2020-01-03 NOTE — TELEPHONE ENCOUNTER
Patient made aware of LSIL on pap with negative HPV and need to repeat pap with cotesting in 1 year

## 2020-03-31 DIAGNOSIS — I10 ESSENTIAL HYPERTENSION: ICD-10-CM

## 2020-03-31 RX ORDER — LOSARTAN POTASSIUM 100 MG/1
100 TABLET ORAL DAILY
Qty: 90 TABLET | Refills: 1 | Status: SHIPPED | OUTPATIENT
Start: 2020-03-31 | End: 2020-09-22

## 2020-03-31 RX ORDER — AMLODIPINE BESYLATE 2.5 MG/1
2.5 TABLET ORAL DAILY
Qty: 7 TABLET | Refills: 0 | Status: SHIPPED | OUTPATIENT
Start: 2020-03-31 | End: 2020-03-31 | Stop reason: SDUPTHER

## 2020-03-31 RX ORDER — AMLODIPINE BESYLATE 2.5 MG/1
2.5 TABLET ORAL DAILY
Qty: 90 TABLET | Refills: 1 | Status: SHIPPED | OUTPATIENT
Start: 2020-03-31 | End: 2020-09-22

## 2020-03-31 RX ORDER — LOSARTAN POTASSIUM 100 MG/1
100 TABLET ORAL DAILY
Qty: 7 TABLET | Refills: 0 | Status: SHIPPED | OUTPATIENT
Start: 2020-03-31 | End: 2020-03-31 | Stop reason: SDUPTHER

## 2020-09-22 DIAGNOSIS — I10 ESSENTIAL HYPERTENSION: ICD-10-CM

## 2020-09-22 RX ORDER — LOSARTAN POTASSIUM 100 MG/1
TABLET ORAL
Qty: 90 TABLET | Refills: 1 | Status: SHIPPED | OUTPATIENT
Start: 2020-09-22 | End: 2020-12-11

## 2020-09-22 RX ORDER — AMLODIPINE BESYLATE 2.5 MG/1
TABLET ORAL
Qty: 90 TABLET | Refills: 1 | Status: SHIPPED | OUTPATIENT
Start: 2020-09-22 | End: 2021-04-27

## 2020-12-02 ENCOUNTER — APPOINTMENT (OUTPATIENT)
Dept: LAB | Facility: HOSPITAL | Age: 50
End: 2020-12-02
Payer: COMMERCIAL

## 2020-12-02 ENCOUNTER — OFFICE VISIT (OUTPATIENT)
Dept: FAMILY MEDICINE CLINIC | Facility: CLINIC | Age: 50
End: 2020-12-02
Payer: COMMERCIAL

## 2020-12-02 ENCOUNTER — HOSPITAL ENCOUNTER (OUTPATIENT)
Dept: CT IMAGING | Facility: HOSPITAL | Age: 50
Discharge: HOME | End: 2020-12-02
Payer: COMMERCIAL

## 2020-12-02 VITALS
OXYGEN SATURATION: 99 % | SYSTOLIC BLOOD PRESSURE: 102 MMHG | HEIGHT: 63 IN | DIASTOLIC BLOOD PRESSURE: 70 MMHG | BODY MASS INDEX: 29.2 KG/M2 | TEMPERATURE: 97.6 F | WEIGHT: 164.8 LBS | HEART RATE: 86 BPM

## 2020-12-02 DIAGNOSIS — I10 ESSENTIAL HYPERTENSION: ICD-10-CM

## 2020-12-02 DIAGNOSIS — Z01.812 PRE-PROCEDURAL LABORATORY EXAMINATION: ICD-10-CM

## 2020-12-02 DIAGNOSIS — R10.9 ABDOMINAL PAIN, UNSPECIFIED ABDOMINAL LOCATION: Primary | ICD-10-CM

## 2020-12-02 DIAGNOSIS — R10.9 ABDOMINAL PAIN, UNSPECIFIED ABDOMINAL LOCATION: ICD-10-CM

## 2020-12-02 DIAGNOSIS — K21.9 GASTROESOPHAGEAL REFLUX DISEASE, UNSPECIFIED WHETHER ESOPHAGITIS PRESENT: ICD-10-CM

## 2020-12-02 LAB
ANION GAP SERPL CALCULATED.3IONS-SCNC: 9 MMOL/L (ref 4–13)
BUN SERPL-MCNC: 16 MG/DL (ref 5–25)
CALCIUM SERPL-MCNC: 9.1 MG/DL (ref 8.3–10.1)
CHLORIDE SERPL-SCNC: 102 MMOL/L (ref 100–108)
CO2 SERPL-SCNC: 27 MMOL/L (ref 21–32)
CREAT SERPL-MCNC: 1.25 MG/DL (ref 0.6–1.3)
GFR SERPL CREATININE-BSD FRML MDRD: 59 ML/MIN/1.73SQ M
GLUCOSE SERPL-MCNC: 97 MG/DL (ref 65–140)
POTASSIUM SERPL-SCNC: 4.5 MMOL/L (ref 3.5–5.3)
SODIUM SERPL-SCNC: 138 MMOL/L (ref 136–145)

## 2020-12-02 PROCEDURE — 3078F DIAST BP <80 MM HG: CPT | Performed by: FAMILY MEDICINE

## 2020-12-02 PROCEDURE — 3725F SCREEN DEPRESSION PERFORMED: CPT | Performed by: FAMILY MEDICINE

## 2020-12-02 PROCEDURE — 80048 BASIC METABOLIC PNL TOTAL CA: CPT

## 2020-12-02 PROCEDURE — 36415 COLL VENOUS BLD VENIPUNCTURE: CPT

## 2020-12-02 PROCEDURE — 3074F SYST BP LT 130 MM HG: CPT | Performed by: FAMILY MEDICINE

## 2020-12-02 PROCEDURE — 3008F BODY MASS INDEX DOCD: CPT | Performed by: FAMILY MEDICINE

## 2020-12-02 PROCEDURE — 99214 OFFICE O/P EST MOD 30 MIN: CPT | Performed by: FAMILY MEDICINE

## 2020-12-02 PROCEDURE — G1004 CDSM NDSC: HCPCS

## 2020-12-02 PROCEDURE — 74177 CT ABD & PELVIS W/CONTRAST: CPT

## 2020-12-02 RX ORDER — HYDROCODONE BITARTRATE AND ACETAMINOPHEN 5; 325 MG/1; MG/1
1 TABLET ORAL EVERY 6 HOURS PRN
Qty: 20 TABLET | Refills: 0 | Status: SHIPPED | OUTPATIENT
Start: 2020-12-02 | End: 2021-10-18

## 2020-12-02 RX ADMIN — IOHEXOL 50 ML: 240 INJECTION, SOLUTION INTRATHECAL; INTRAVASCULAR; INTRAVENOUS; ORAL at 18:58

## 2020-12-02 RX ADMIN — IOHEXOL 100 ML: 350 INJECTION, SOLUTION INTRAVENOUS at 18:58

## 2020-12-03 ENCOUNTER — TELEPHONE (OUTPATIENT)
Dept: FAMILY MEDICINE CLINIC | Facility: CLINIC | Age: 50
End: 2020-12-03

## 2020-12-03 DIAGNOSIS — R10.9 ABDOMINAL PAIN, UNSPECIFIED ABDOMINAL LOCATION: ICD-10-CM

## 2020-12-03 DIAGNOSIS — K21.9 GASTROESOPHAGEAL REFLUX DISEASE, UNSPECIFIED WHETHER ESOPHAGITIS PRESENT: Primary | ICD-10-CM

## 2020-12-03 DIAGNOSIS — Z98.84 BARIATRIC SURGERY STATUS: ICD-10-CM

## 2020-12-05 DIAGNOSIS — K21.9 GASTROESOPHAGEAL REFLUX DISEASE, UNSPECIFIED WHETHER ESOPHAGITIS PRESENT: ICD-10-CM

## 2020-12-07 ENCOUNTER — TELEPHONE (OUTPATIENT)
Dept: FAMILY MEDICINE CLINIC | Facility: CLINIC | Age: 50
End: 2020-12-07

## 2020-12-09 DIAGNOSIS — Z91.018 HISTORY OF FOOD ALLERGY: Primary | ICD-10-CM

## 2020-12-09 RX ORDER — EPINEPHRINE 0.3 MG/.3ML
0.3 INJECTION SUBCUTANEOUS ONCE
Qty: 0.6 ML | Refills: 1 | Status: SHIPPED | OUTPATIENT
Start: 2020-12-09 | End: 2020-12-09

## 2020-12-11 DIAGNOSIS — I10 ESSENTIAL HYPERTENSION: ICD-10-CM

## 2020-12-11 RX ORDER — LOSARTAN POTASSIUM 100 MG/1
TABLET ORAL
Qty: 90 TABLET | Refills: 1 | Status: SHIPPED | OUTPATIENT
Start: 2020-12-11 | End: 2021-06-18

## 2021-04-27 DIAGNOSIS — I10 ESSENTIAL HYPERTENSION: ICD-10-CM

## 2021-04-27 RX ORDER — AMLODIPINE BESYLATE 2.5 MG/1
TABLET ORAL
Qty: 90 TABLET | Refills: 1 | Status: SHIPPED | OUTPATIENT
Start: 2021-04-27 | End: 2021-10-11

## 2021-06-18 DIAGNOSIS — I10 ESSENTIAL HYPERTENSION: ICD-10-CM

## 2021-06-18 RX ORDER — LOSARTAN POTASSIUM 100 MG/1
TABLET ORAL
Qty: 90 TABLET | Refills: 1 | Status: SHIPPED | OUTPATIENT
Start: 2021-06-18 | End: 2021-10-18

## 2021-09-15 ENCOUNTER — TELEPHONE (OUTPATIENT)
Dept: FAMILY MEDICINE CLINIC | Facility: CLINIC | Age: 51
End: 2021-09-15

## 2021-09-15 NOTE — TELEPHONE ENCOUNTER
Patient called stating she was at [facility] Emergency Room on 09/14/2021 with abdominal pain  They suggested that she have an EGD  I would like to bring her in with you for a TCM and there is no room on your schedule for this week  She states she is still having pain  Can we squeeze her into your schedule in the next couple days  Please let me know so I can call the patient back

## 2021-09-16 NOTE — TELEPHONE ENCOUNTER
SORRY for delay, EGD would be through GASTROENTEROLOGIST  I can do referral but not sure how quick OV and EGD can be scheduled? ?  PATIENT should be taking some thing like omeprazole and  sulcrafate  (PATIENT has texted me)

## 2021-09-16 NOTE — TELEPHONE ENCOUNTER
Spoke with patient and gave her the information, she was not happy with me due to I didn't call her back yesterday due to her being in pain  She stated she wasn't happy with the way the office is being run

## 2021-09-16 NOTE — TELEPHONE ENCOUNTER
IN FACT, patient sees Dr Nathaly Aguiar tomorrow and HE DOES EGD--- so maybe he can get this done ASAP

## 2021-09-17 ENCOUNTER — PREP FOR PROCEDURE (OUTPATIENT)
Dept: BARIATRICS | Facility: CLINIC | Age: 51
End: 2021-09-17

## 2021-09-17 ENCOUNTER — OFFICE VISIT (OUTPATIENT)
Dept: BARIATRICS | Facility: CLINIC | Age: 51
End: 2021-09-17
Payer: COMMERCIAL

## 2021-09-17 VITALS
BODY MASS INDEX: 31.01 KG/M2 | HEART RATE: 74 BPM | HEIGHT: 62 IN | SYSTOLIC BLOOD PRESSURE: 100 MMHG | WEIGHT: 168.5 LBS | DIASTOLIC BLOOD PRESSURE: 70 MMHG | TEMPERATURE: 98.6 F

## 2021-09-17 DIAGNOSIS — K21.9 GERD (GASTROESOPHAGEAL REFLUX DISEASE): Primary | ICD-10-CM

## 2021-09-17 DIAGNOSIS — Z98.84 BARIATRIC SURGERY STATUS: Primary | ICD-10-CM

## 2021-09-17 PROCEDURE — 99213 OFFICE O/P EST LOW 20 MIN: CPT | Performed by: SURGERY

## 2021-09-17 PROCEDURE — 3008F BODY MASS INDEX DOCD: CPT | Performed by: SURGERY

## 2021-09-17 RX ORDER — OMEPRAZOLE 40 MG/1
40 CAPSULE, DELAYED RELEASE ORAL DAILY
Qty: 30 CAPSULE | Refills: 0 | Status: SHIPPED | OUTPATIENT
Start: 2021-09-17 | End: 2021-09-22 | Stop reason: SDUPTHER

## 2021-09-17 RX ORDER — LIDOCAINE HYDROCHLORIDE 20 MG/ML
15 SOLUTION OROPHARYNGEAL 4 TIMES DAILY PRN
Qty: 600 ML | Refills: 0 | Status: SHIPPED | OUTPATIENT
Start: 2021-09-17 | End: 2021-09-27

## 2021-09-17 RX ORDER — VITAMIN E 268 MG
400 CAPSULE ORAL
COMMUNITY

## 2021-09-17 RX ORDER — OMEPRAZOLE 20 MG/1
20 CAPSULE, DELAYED RELEASE ORAL 2 TIMES DAILY
Qty: 30 CAPSULE | Refills: 1 | Status: SHIPPED | OUTPATIENT
Start: 2021-09-17 | End: 2021-09-17 | Stop reason: DRUGHIGH

## 2021-09-17 RX ORDER — SUCRALFATE 1 G/1
1 TABLET ORAL
COMMUNITY
Start: 2021-09-14 | End: 2021-09-22 | Stop reason: HOSPADM

## 2021-09-17 NOTE — TELEPHONE ENCOUNTER
Patient called office today after her visit Patient stated she was calling from her pharmacy to let us know that her insurance rejected the script for Omeprazole 20mg x2 daily  Patient states the script must say Omeprazole 40mg x1 daily for it to be covered with her insurance

## 2021-09-17 NOTE — PROGRESS NOTES
CLINIC VISIT - BARIATRIC SURGERY  April Wesley Long 54 y o  female MRN: [id number]  Unit/Bed#:  Encounter: [id number]      HPI:  Teena Quinn is a 54 y o  female status post laparoscopic RNYGB performed by Dr Anayeli Desai in 2011 returning to office after recent visit to the ER for abdominal pain and vomiting  She states that she has been having worsening abdominal pain over the last 2 1/2 weeks, particularly when she eats or drinks  It got to the point where the pain was severe enough to require an ER visit  At that visit they did a CT scan that showed no acute abdominal pathology and she was discharged with a prescription for carafate as this pain is likely due to an ulcer    She has a hx of smoking cigars and drinking 2-3 glasses of wine per night  She states that she has not smoked or drunk alcohol in the past week     She is currently the carafate and [place] without relief    She notes that she has started having slightly blood tinged emesis for the past 2 days    Last EGD was done in 2018 showing pouchitis, but no ulcer    Review of Systems   Constitutional: Negative for chills and fever  HENT: Negative for ear pain and sore throat  Eyes: Negative for pain and visual disturbance  Respiratory: Negative for cough and shortness of breath  Cardiovascular: Negative for chest pain and palpitations  Gastrointestinal: Positive for abdominal pain and nausea  Negative for vomiting  +GERD   Genitourinary: Negative for dysuria and hematuria  Musculoskeletal: Negative for arthralgias and back pain  Skin: Negative for color change and rash  Neurological: Negative for seizures and syncope  All other systems reviewed and are negative        Historical Information   Past Medical History:   Diagnosis Date    Abdominal mass, RLQ (right lower quadrant)     last assessed 6/25/2015    Allergic rhinitis     last assessed 8/14/2012    Carpal tunnel syndrome     Chronic left shoulder pain     Chronic pain disorder     left shoulder    Epigastric pain     Fracture of ankle     last assessed 10/21/2013    Frequent headaches     Gall stones     GERD (gastroesophageal reflux disease)     Hiatal hernia     last assessed 8/14/2012    Left supraspinatus tendinitis     Obesity surgery status     Onychomycosis     Osteopenia     Plantar fasciitis     Postgastrectomy malabsorption     RUQ pain     Sacroiliitis (HCC)      Past Surgical History:   Procedure Laterality Date    GALLBLADDER SURGERY      GASTRIC BYPASS      HERNIA REPAIR      CA EGD TRANSORAL BIOPSY SINGLE/MULTIPLE N/A 4/4/2018    Procedure: ESOPHAGOGASTRODUODENOSCOPY (EGD); Surgeon: Deepthi Ross MD;  Location: [facility]; Service: Bariatrics    ROTATOR CUFF REPAIR      SHOULDER SURGERY       Social History   Social History     Substance and Sexual Activity   Alcohol Use Yes    Comment: 3-4 shots 5 times per week     Social History     Substance and Sexual Activity   Drug Use No     Social History     Tobacco Use   Smoking Status Current Some Day Smoker   Smokeless Tobacco Never Used   Tobacco Comment    smokes 3 cloves per day  Per allscripts 'never a smoker'     Family History: non-contributory    Meds/Allergies   all medications and allergies reviewed  Allergies   Allergen Reactions    Crab Extract Allergy Skin Test - Food Allergy      Lips swell    Penicillins     Pollen Extract     Amoxicillin Rash     Face swells       Objective       Current Vitals:   Blood Pressure: 100/70 (09/17/21 1135)  Pulse: 74 (09/17/21 1135)  Temperature: 98 6 °F (37 °C) (09/17/21 1135)  Temp Source: Tympanic (09/17/21 1135)  Height: 5' 1 5" (156 2 cm) (09/17/21 1135)  Weight - Scale: 76 4 kg (168 lb 8 oz) (09/17/21 1135)      Invasive Devices     None                 Physical Exam  Constitutional:       Appearance: Normal appearance  Cardiovascular:      Rate and Rhythm: Normal rate and regular rhythm     Pulmonary:      Effort: Pulmonary effort is normal    Abdominal:      General: Abdomen is flat  Palpations: Abdomen is soft  Neurological:      General: No focal deficit present  Mental Status: She is alert and oriented to person, place, and time               Assessment/PLAN:    54 y o  female status postlaparoscopic RNYGB performed by Dr Kassy Novak in 2011 returning to office after recent visit to the ER for abdominal pain and vomiting    Recommend taking omeprazole BID and carafate 4x per day with meals  Recommend quitting smoking and drinking  Avoid NSAID use  Will schedule for EGD  Follow up after EGD in the clinic        Deneen Ortiz MD  Bariatric Surgery   9/17/2021  11:47 AM

## 2021-09-21 ENCOUNTER — TELEPHONE (OUTPATIENT)
Dept: GASTROENTEROLOGY | Facility: HOSPITAL | Age: 51
End: 2021-09-21

## 2021-09-22 ENCOUNTER — TELEPHONE (OUTPATIENT)
Dept: BARIATRICS | Facility: CLINIC | Age: 51
End: 2021-09-22

## 2021-09-22 ENCOUNTER — ANESTHESIA EVENT (OUTPATIENT)
Dept: GASTROENTEROLOGY | Facility: HOSPITAL | Age: 51
End: 2021-09-22

## 2021-09-22 ENCOUNTER — HOSPITAL ENCOUNTER (OUTPATIENT)
Dept: GASTROENTEROLOGY | Facility: HOSPITAL | Age: 51
Setting detail: OUTPATIENT SURGERY
Discharge: HOME | End: 2021-09-22
Attending: SURGERY
Payer: COMMERCIAL

## 2021-09-22 ENCOUNTER — ANESTHESIA (OUTPATIENT)
Dept: GASTROENTEROLOGY | Facility: HOSPITAL | Age: 51
End: 2021-09-22

## 2021-09-22 VITALS
OXYGEN SATURATION: 100 % | RESPIRATION RATE: 20 BRPM | BODY MASS INDEX: 31.23 KG/M2 | HEART RATE: 66 BPM | TEMPERATURE: 97.5 F | DIASTOLIC BLOOD PRESSURE: 54 MMHG | SYSTOLIC BLOOD PRESSURE: 98 MMHG | WEIGHT: 168 LBS

## 2021-09-22 DIAGNOSIS — Z98.84 BARIATRIC SURGERY STATUS: ICD-10-CM

## 2021-09-22 DIAGNOSIS — K21.9 GERD (GASTROESOPHAGEAL REFLUX DISEASE): ICD-10-CM

## 2021-09-22 DIAGNOSIS — K28.9 MARGINAL ULCERS: Primary | ICD-10-CM

## 2021-09-22 PROCEDURE — 88305 TISSUE EXAM BY PATHOLOGIST: CPT | Performed by: PATHOLOGY

## 2021-09-22 PROCEDURE — 43239 EGD BIOPSY SINGLE/MULTIPLE: CPT | Performed by: SURGERY

## 2021-09-22 RX ORDER — OMEPRAZOLE 20 MG/1
20 CAPSULE, DELAYED RELEASE ORAL 2 TIMES DAILY
Qty: 30 CAPSULE | Refills: 3 | Status: SHIPPED | OUTPATIENT
Start: 2021-09-22 | End: 2021-12-06 | Stop reason: SDUPTHER

## 2021-09-22 RX ORDER — SUCRALFATE ORAL 1 G/10ML
1 SUSPENSION ORAL 4 TIMES DAILY
Qty: 1200 ML | Refills: 3 | Status: SHIPPED | OUTPATIENT
Start: 2021-09-22 | End: 2021-12-06 | Stop reason: SDUPTHER

## 2021-09-22 RX ORDER — MISOPROSTOL 100 UG/1
100 TABLET ORAL 4 TIMES DAILY
Qty: 120 TABLET | Refills: 0 | Status: SHIPPED | OUTPATIENT
Start: 2021-09-22 | End: 2021-10-18

## 2021-09-22 RX ORDER — SODIUM CHLORIDE 9 MG/ML
125 INJECTION, SOLUTION INTRAVENOUS CONTINUOUS
Status: DISCONTINUED | OUTPATIENT
Start: 2021-09-22 | End: 2021-09-26 | Stop reason: HOSPADM

## 2021-09-22 RX ORDER — PROPOFOL 10 MG/ML
INJECTION, EMULSION INTRAVENOUS AS NEEDED
Status: DISCONTINUED | OUTPATIENT
Start: 2021-09-22 | End: 2021-09-22

## 2021-09-22 RX ADMIN — PROPOFOL 30 MG: 10 INJECTION, EMULSION INTRAVENOUS at 12:23

## 2021-09-22 RX ADMIN — PROPOFOL 20 MG: 10 INJECTION, EMULSION INTRAVENOUS at 12:25

## 2021-09-22 RX ADMIN — PROPOFOL 30 MG: 10 INJECTION, EMULSION INTRAVENOUS at 12:24

## 2021-09-22 RX ADMIN — SODIUM CHLORIDE 125 ML/HR: 0.9 INJECTION, SOLUTION INTRAVENOUS at 11:10

## 2021-09-22 RX ADMIN — PROPOFOL 170 MG: 10 INJECTION, EMULSION INTRAVENOUS at 12:21

## 2021-09-22 NOTE — DISCHARGE INSTRUCTIONS
Upper Endoscopy   WHAT YOU NEED TO KNOW:   An upper endoscopy is also called an upper gastrointestinal (GI) endoscopy, or an esophagogastroduodenoscopy (EGD)  You may feel bloated, gassy, or have some abdominal discomfort after your procedure  Your throat may be sore for 24 to 36 hours  You may burp or pass gas from air that is still inside your body  DISCHARGE INSTRUCTIONS:   Call 911 if:   · You have sudden chest pain or trouble breathing  Seek care immediately if:   · You feel dizzy or faint  · You have trouble swallowing  · You have severe throat pain  · Your bowel movements are very dark or black  · Your abdomen is hard and firm and you have severe pain  · You vomit blood  Contact your healthcare provider if:   · You feel full or bloated and cannot burp or pass gas  · You have not had a bowel movement for 3 days after your procedure  · You have neck pain  · You have a fever or chills  · You have nausea or are vomiting  · You have a rash or hives  · You have questions or concerns about your endoscopy  Relieve a sore throat:  Suck on throat lozenges or crushed ice  Gargle with a small amount of warm salt water  Mix 1 teaspoon of salt and 1 cup of warm water to make salt water  Relieve gas and discomfort from bloating:  Lie on your right side with a heating pad on your abdomen  Take short walks to help pass gas  Eat small meals until bloating is relieved  Rest after your procedure:  Do not drive or make important decisions until the day after your procedure  Return to your normal activity as directed  You can usually return to work the day after your procedure  Follow up with your healthcare provider as directed:  Write down your questions so you remember to ask them during your visits  © Copyright [employer] 2021 Information is for End User's use only and may not be sold, redistributed or otherwise used for commercial purposes   All illustrations and images included in CareNotes® are the copyrighted property of A D A M , Inc  or Danielle Washington   The above information is an  only  It is not intended as medical advice for individual conditions or treatments  Talk to your doctor, nurse or pharmacist before following any medical regimen to see if it is safe and effective for you  Peptic Ulcer   WHAT YOU NEED TO KNOW:   A peptic ulcer is an open sore in the lining of your stomach, intestine, or esophagus  Peptic ulcers have different names, depending on their location  Gastric ulcers are peptic ulcers in the stomach  Duodenal ulcers are peptic ulcers in the intestine  Esophageal ulcers are peptic ulcers in the esophagus  Peptic ulcers may be a short-term or long-term problem  DISCHARGE INSTRUCTIONS:   Call your local emergency number (911 in the [address], located within [facility] [facility]) if:   · You have a fast heartbeat or fast breathing  · You are too dizzy or weak to stand up  · You vomit bright red blood  · You have bright red blood in your bowel movement  Seek care immediately if:   · You have severe pain in your stomach  · Your vomit looks like coffee grounds  · Your bowel movements are black  · You have sudden shortness of breath  Call your doctor if:   · You have a fever  · You have diarrhea or constipation  · Your stomach pain does not go away or gets worse after you take medicine  · You have questions or concerns about your condition or care  Medicines: You may need any of the following:  · Antacids  decrease stomach acid  · Antiulcer medicines  help decrease the amount of acid made by the stomach  These help relieve pain and heal or prevent ulcers  · Antibiotics  help kill bacteria  · Take your medicine as directed  Contact your healthcare provider if you think your medicine is not helping or if you have side effects  Tell him or her if you are allergic to any medicine   Keep a list of the medicines, vitamins, and herbs you take  Include the amounts, and when and why you take them  Bring the list or the pill bottles to follow-up visits  Carry your medicine list with you in case of an emergency  Nutrition:   · Do not have carbonated drinks, alcohol, or caffeine  Caffeine is found in some coffees, teas, and sodas  It is also found in chocolate  · Do not eat foods that upset your stomach  These include spicy or acidic foods, such as oranges  · Eat small meals more often rather than big meals less often  An empty stomach may make your symptoms worse  Do not smoke:  Smoking increases your risk of developing ulcers  Nicotine and other chemicals in cigarettes and cigars can also cause lung damage  Ask your healthcare provider for information if you currently smoke and need help to quit  E-cigarettes or smokeless tobacco still contain nicotine  Talk to your healthcare provider before you use these products  Follow up with your doctor as directed:  Write down your questions so you remember to ask them during your visits  © Copyright [employer] 2021 Information is for End User's use only and may not be sold, redistributed or otherwise used for commercial purposes  All illustrations and images included in CareNotes® are the copyrighted property of [employer]  or Danielle Washington   The above information is an  only  It is not intended as medical advice for individual conditions or treatments  Talk to your doctor, nurse or pharmacist before following any medical regimen to see if it is safe and effective for you

## 2021-09-22 NOTE — H&P
54 y o  female status postlaparoscopic RNYGB performed by Dr Kaya Pérez in 2011 returning to office after recent visit to the ER for abdominal pain and vomiting  Here for an EGD to evaluate the anatomy of the GI tract  Physical Exam    /75   Pulse 74   Temp 97 5 °F (36 4 °C) (Temporal)   Resp 16   Wt 76 2 kg (168 lb)   SpO2 100%   BMI 31 23 kg/m²    AAOx3  RRR  CTA B  Abdomen obese  Benign  A/P:    This is a 54 y o  female status post a gastric bypass in 2011 and no abdominal pain  Will proceed with the EGD and biopsies        Lars Hicks MD  09/22/21  12:17 PM

## 2021-09-22 NOTE — TELEPHONE ENCOUNTER
Patient called the office this afternoon stating she had an EGD done today and she is in a lot of pain  Patient stated that the medication that was prescribed to her today is not going to do anything for the pain she is in  Patient stated that she has put a lot of money into co-pays to get help for her pain and we are not doing anything to help her  Advised patient that any pain medication will not help but rather hurt her at this point and the medication that was prescribed to her will help with pain when taken as prescribed  Patient stated that one of the medications that was prescribed is not available from her pharmacy until tomorrow  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

## 2021-09-22 NOTE — ANESTHESIA PREPROCEDURE EVALUATION
Review of Systems/Medical History  Patient summary reviewed  Chart reviewed  History of anesthetic complications     Cardiovascular   Pulmonary  Smoker cigarette smoker  ,        GI/Hepatic    GERD well controlled, Bariatric surgery,             Endo/Other  History of thyroid disease , hyperthyroidism,      GYN       Hematology  Negative hematology ROS      Musculoskeletal    Arthritis     Neurology    No neuromuscular disease , Headaches,    Psychology           Physical Exam    Airway    Mallampati score: I  TM Distance: >3 FB  Neck ROM: full     Dental   No notable dental hx     Cardiovascular  Rhythm: regular, Rate: normal, Cardiovascular exam normal    Pulmonary  Pulmonary exam normal Breath sounds clear to auscultation,     Other Findings        Anesthesia Plan  ASA Score- 2     Anesthesia Type- IV sedation with anesthesia and general with ASA Monitors  Additional Monitors:   Airway Plan:           Plan Factors-    Chart reviewed  Existing labs reviewed  Patient summary reviewed  Patient not instructed to abstain from smoking on day of procedure  Patient did not smoke on day of surgery  Induction- intravenous  Postoperative Plan-     Informed Consent- Anesthetic plan and risks discussed with patient

## 2021-10-08 ENCOUNTER — TELEPHONE (OUTPATIENT)
Dept: FAMILY MEDICINE CLINIC | Facility: CLINIC | Age: 51
End: 2021-10-08

## 2021-10-08 ENCOUNTER — OFFICE VISIT (OUTPATIENT)
Dept: BARIATRICS | Facility: CLINIC | Age: 51
End: 2021-10-08
Payer: COMMERCIAL

## 2021-10-08 VITALS
HEIGHT: 62 IN | BODY MASS INDEX: 31.65 KG/M2 | HEART RATE: 85 BPM | TEMPERATURE: 98.1 F | WEIGHT: 172 LBS | DIASTOLIC BLOOD PRESSURE: 62 MMHG | SYSTOLIC BLOOD PRESSURE: 102 MMHG

## 2021-10-08 DIAGNOSIS — K28.9 MARGINAL ULCER: Primary | ICD-10-CM

## 2021-10-08 PROCEDURE — 99213 OFFICE O/P EST LOW 20 MIN: CPT | Performed by: SURGERY

## 2021-10-09 DIAGNOSIS — I10 ESSENTIAL HYPERTENSION: ICD-10-CM

## 2021-10-11 ENCOUNTER — RA CDI HCC (OUTPATIENT)
Dept: OTHER | Facility: HOSPITAL | Age: 51
End: 2021-10-11

## 2021-10-11 RX ORDER — AMLODIPINE BESYLATE 2.5 MG/1
TABLET ORAL
Qty: 90 TABLET | Refills: 1 | Status: SHIPPED | OUTPATIENT
Start: 2021-10-11 | End: 2021-10-18

## 2021-10-14 ENCOUNTER — PREP FOR PROCEDURE (OUTPATIENT)
Dept: BARIATRICS | Facility: CLINIC | Age: 51
End: 2021-10-14

## 2021-10-14 DIAGNOSIS — Z98.84 BARIATRIC SURGERY STATUS: Primary | ICD-10-CM

## 2021-10-18 ENCOUNTER — OFFICE VISIT (OUTPATIENT)
Dept: FAMILY MEDICINE CLINIC | Facility: CLINIC | Age: 51
End: 2021-10-18
Payer: COMMERCIAL

## 2021-10-18 VITALS
WEIGHT: 170.8 LBS | OXYGEN SATURATION: 98 % | HEART RATE: 80 BPM | RESPIRATION RATE: 16 BRPM | SYSTOLIC BLOOD PRESSURE: 100 MMHG | TEMPERATURE: 96.7 F | HEIGHT: 62 IN | DIASTOLIC BLOOD PRESSURE: 62 MMHG | BODY MASS INDEX: 31.43 KG/M2

## 2021-10-18 DIAGNOSIS — E55.9 VITAMIN D DEFICIENCY: ICD-10-CM

## 2021-10-18 DIAGNOSIS — E53.8 VITAMIN B12 DEFICIENCY: ICD-10-CM

## 2021-10-18 DIAGNOSIS — Z12.31 SCREENING MAMMOGRAM, ENCOUNTER FOR: ICD-10-CM

## 2021-10-18 DIAGNOSIS — I10 ESSENTIAL HYPERTENSION: Primary | ICD-10-CM

## 2021-10-18 DIAGNOSIS — Z98.84 BARIATRIC SURGERY STATUS: ICD-10-CM

## 2021-10-18 PROCEDURE — 99214 OFFICE O/P EST MOD 30 MIN: CPT | Performed by: FAMILY MEDICINE

## 2021-10-18 PROCEDURE — 3725F SCREEN DEPRESSION PERFORMED: CPT | Performed by: FAMILY MEDICINE

## 2021-10-18 PROCEDURE — 3008F BODY MASS INDEX DOCD: CPT | Performed by: FAMILY MEDICINE

## 2021-10-18 RX ORDER — AMLODIPINE BESYLATE 2.5 MG/1
2.5 TABLET ORAL DAILY
Qty: 90 TABLET | Refills: 1
Start: 2021-10-18 | End: 2021-12-06 | Stop reason: SDUPTHER

## 2021-10-18 RX ORDER — LOSARTAN POTASSIUM 100 MG/1
50 TABLET ORAL DAILY
Qty: 90 TABLET | Refills: 1
Start: 2021-10-18 | End: 2021-11-29

## 2021-10-20 DIAGNOSIS — K21.9 GERD (GASTROESOPHAGEAL REFLUX DISEASE): ICD-10-CM

## 2021-10-20 RX ORDER — OMEPRAZOLE 40 MG/1
CAPSULE, DELAYED RELEASE ORAL
Qty: 30 CAPSULE | Refills: 0 | Status: SHIPPED | OUTPATIENT
Start: 2021-10-20 | End: 2021-12-12

## 2021-11-27 DIAGNOSIS — I10 ESSENTIAL HYPERTENSION: ICD-10-CM

## 2021-11-29 RX ORDER — LOSARTAN POTASSIUM 100 MG/1
TABLET ORAL
Qty: 90 TABLET | Refills: 1 | Status: SHIPPED | OUTPATIENT
Start: 2021-11-29

## 2021-12-06 ENCOUNTER — OFFICE VISIT (OUTPATIENT)
Dept: FAMILY MEDICINE CLINIC | Facility: CLINIC | Age: 51
End: 2021-12-06
Payer: COMMERCIAL

## 2021-12-06 VITALS
HEIGHT: 62 IN | SYSTOLIC BLOOD PRESSURE: 100 MMHG | HEART RATE: 91 BPM | TEMPERATURE: 96.9 F | DIASTOLIC BLOOD PRESSURE: 60 MMHG | BODY MASS INDEX: 32.46 KG/M2 | OXYGEN SATURATION: 96 % | WEIGHT: 176.4 LBS

## 2021-12-06 DIAGNOSIS — K21.9 GERD (GASTROESOPHAGEAL REFLUX DISEASE): ICD-10-CM

## 2021-12-06 DIAGNOSIS — I10 ESSENTIAL HYPERTENSION: ICD-10-CM

## 2021-12-06 DIAGNOSIS — K28.9 MARGINAL ULCERS: ICD-10-CM

## 2021-12-06 PROCEDURE — 99213 OFFICE O/P EST LOW 20 MIN: CPT | Performed by: FAMILY MEDICINE

## 2021-12-06 PROCEDURE — 3008F BODY MASS INDEX DOCD: CPT | Performed by: FAMILY MEDICINE

## 2021-12-06 RX ORDER — SUCRALFATE ORAL 1 G/10ML
1 SUSPENSION ORAL 4 TIMES DAILY
Qty: 1200 ML | Refills: 3 | Status: SHIPPED | OUTPATIENT
Start: 2021-12-06 | End: 2022-01-05

## 2021-12-06 RX ORDER — AMLODIPINE BESYLATE 2.5 MG/1
2.5 TABLET ORAL DAILY
Qty: 90 TABLET | Refills: 1 | Status: SHIPPED | OUTPATIENT
Start: 2021-12-06

## 2021-12-06 RX ORDER — OMEPRAZOLE 20 MG/1
20 CAPSULE, DELAYED RELEASE ORAL 2 TIMES DAILY
Qty: 30 CAPSULE | Refills: 3 | Status: SHIPPED | OUTPATIENT
Start: 2021-12-06 | End: 2021-12-06

## 2022-03-03 ENCOUNTER — RA CDI HCC (OUTPATIENT)
Dept: OTHER | Facility: HOSPITAL | Age: 52
End: 2022-03-03

## 2022-03-03 NOTE — PROGRESS NOTES
[facility]  coding opportunities       Chart reviewed, no opportunity found: CHART REVIEWED, NO OPPORTUNITY FOUND                        Patients insurance company: [employer] (Medicare Advantage and Commercial)

## 2022-05-14 DIAGNOSIS — K21.9 GERD (GASTROESOPHAGEAL REFLUX DISEASE): ICD-10-CM

## 2022-05-16 RX ORDER — OMEPRAZOLE 40 MG/1
CAPSULE, DELAYED RELEASE ORAL
Qty: 90 CAPSULE | Refills: 1 | Status: SHIPPED | OUTPATIENT
Start: 2022-05-16

## 2022-10-07 ENCOUNTER — VBI (OUTPATIENT)
Dept: ADMINISTRATIVE | Facility: OTHER | Age: 52
End: 2022-10-07

## 2022-10-19 ENCOUNTER — OFFICE VISIT (OUTPATIENT)
Dept: FAMILY MEDICINE CLINIC | Facility: CLINIC | Age: 52
End: 2022-10-19
Payer: COMMERCIAL

## 2022-10-19 VITALS
SYSTOLIC BLOOD PRESSURE: 132 MMHG | TEMPERATURE: 96 F | DIASTOLIC BLOOD PRESSURE: 82 MMHG | WEIGHT: 170 LBS | HEART RATE: 74 BPM | BODY MASS INDEX: 31.28 KG/M2 | OXYGEN SATURATION: 98 % | HEIGHT: 62 IN

## 2022-10-19 DIAGNOSIS — Z23 ENCOUNTER FOR IMMUNIZATION: ICD-10-CM

## 2022-10-19 DIAGNOSIS — E53.8 VITAMIN B12 DEFICIENCY: ICD-10-CM

## 2022-10-19 DIAGNOSIS — E55.9 VITAMIN D DEFICIENCY: ICD-10-CM

## 2022-10-19 DIAGNOSIS — K21.9 GERD (GASTROESOPHAGEAL REFLUX DISEASE): ICD-10-CM

## 2022-10-19 DIAGNOSIS — M54.41 CHRONIC RIGHT-SIDED LOW BACK PAIN WITH RIGHT-SIDED SCIATICA: ICD-10-CM

## 2022-10-19 DIAGNOSIS — I10 ESSENTIAL HYPERTENSION: Primary | ICD-10-CM

## 2022-10-19 DIAGNOSIS — Z12.12 SCREENING FOR COLORECTAL CANCER: ICD-10-CM

## 2022-10-19 DIAGNOSIS — Z98.84 BARIATRIC SURGERY STATUS: ICD-10-CM

## 2022-10-19 DIAGNOSIS — Z12.31 ENCOUNTER FOR SCREENING MAMMOGRAM FOR MALIGNANT NEOPLASM OF BREAST: ICD-10-CM

## 2022-10-19 DIAGNOSIS — K21.9 GASTROESOPHAGEAL REFLUX DISEASE, UNSPECIFIED WHETHER ESOPHAGITIS PRESENT: ICD-10-CM

## 2022-10-19 DIAGNOSIS — Z12.11 SCREENING FOR COLORECTAL CANCER: ICD-10-CM

## 2022-10-19 DIAGNOSIS — G89.29 CHRONIC RIGHT-SIDED LOW BACK PAIN WITH RIGHT-SIDED SCIATICA: ICD-10-CM

## 2022-10-19 PROCEDURE — 90686 IIV4 VACC NO PRSV 0.5 ML IM: CPT

## 2022-10-19 PROCEDURE — 99214 OFFICE O/P EST MOD 30 MIN: CPT | Performed by: FAMILY MEDICINE

## 2022-10-19 PROCEDURE — 90471 IMMUNIZATION ADMIN: CPT

## 2022-10-19 RX ORDER — HYDROCODONE BITARTRATE AND ACETAMINOPHEN 5; 325 MG/1; MG/1
1 TABLET ORAL EVERY 6 HOURS PRN
Qty: 30 TABLET | Refills: 0 | Status: SHIPPED | OUTPATIENT
Start: 2022-10-19

## 2022-10-19 RX ORDER — OMEPRAZOLE 40 MG/1
40 CAPSULE, DELAYED RELEASE ORAL DAILY
Qty: 90 CAPSULE | Refills: 1 | Status: SHIPPED | OUTPATIENT
Start: 2022-10-19

## 2022-10-19 RX ORDER — LOSARTAN POTASSIUM 100 MG/1
100 TABLET ORAL DAILY
Qty: 90 TABLET | Refills: 1 | Status: SHIPPED | OUTPATIENT
Start: 2022-10-19

## 2022-10-19 RX ORDER — AMLODIPINE BESYLATE 2.5 MG/1
2.5 TABLET ORAL DAILY
Qty: 90 TABLET | Refills: 1 | Status: SHIPPED | OUTPATIENT
Start: 2022-10-19

## 2022-10-19 NOTE — PROGRESS NOTES
Name: Imelda Metz      : 1970      MRN: [id number]  Encounter Provider: Yaquelin Prieto DO  Encounter Date: 10/19/2022   Encounter department: [facility] PRIMARY CARE    Assessment & Plan     1  Essential hypertension  -     losartan (COZAAR) 100 MG tablet; Take 1 tablet (100 mg total) by mouth daily  -     amLODIPine (NORVASC) 2 5 mg tablet; Take 1 tablet (2 5 mg total) by mouth daily  -     Lipid Panel with Direct LDL reflex; Future  -     Comprehensive metabolic panel; Future  -     TSH, 3rd generation; Future    2  GERD (gastroesophageal reflux disease)  -     omeprazole (PriLOSEC) 40 MG capsule; Take 1 capsule (40 mg total) by mouth daily    3  Gastroesophageal reflux disease, unspecified whether esophagitis present  -     al mag oxide-diphenhydramine-lidocaine viscous (MAGIC MOUTHWASH) 1:1:1 suspension; Swish and swallow 10 mL every 4 (four) hours as needed (dyspepsia)    4  Chronic right-sided low back pain with right-sided sciatica  -     HYDROcodone-acetaminophen (NORCO) 5-325 mg per tablet; Take 1 tablet by mouth every 6 (six) hours as needed for pain Max Daily Amount: 4 tablets    5  Encounter for screening mammogram for malignant neoplasm of breast  -     Mammo screening bilateral w 3d & cad; Future; Expected date: 10/19/2022    6  Screening for colorectal cancer  -     Cologhardy    7  Encounter for immunization  -     influenza vaccine, quadrivalent, 0 5 mL, preservative-free, for adult and pediatric patients 6 mos+ (AFLURIA, FLUARIX, FLULAVAL, FLUZONE)    8  Bariatric surgery status  -     Lipid Panel with Direct LDL reflex; Future  -     Comprehensive metabolic panel; Future  -     TSH, 3rd generation; Future    9  Vitamin D deficiency  -     Vitamin D 25 hydroxy; Future    10  Vitamin B12 deficiency  -     Vitamin B12; Future    The patient is asked to make an attempt to improve diet and exercise patterns to aid in medical management of this problem  BMI Counseling:  Body mass index is 31 6 kg/m²  The BMI is above normal  Nutrition recommendations include decreasing portion sizes, encouraging healthy choices of fruits and vegetables, decreasing fast food intake, consuming healthier snacks, limiting drinks that contain sugar, moderation in carbohydrate intake, increasing intake of lean protein, reducing intake of saturated and trans fat and reducing intake of cholesterol  Exercise recommendations include exercising 3-5 times per week  Patient referred to PCP  Rationale for BMI follow-up plan is due to patient being overweight or obese  Depression Screening and Follow-up Plan: Patient was screened for depression during today's encounter  They screened negative with a PHQ-2 score of 0  Claritin or allegra(daytime) and benadryl at HS  Discussed accomodations for "duty belt" may be out of my purview but I will draft letter     Subjective     Pt is here with several issues    GI Problem  The primary symptoms include arthralgias  Primary symptoms do not include fatigue (on/off)  Nausea: gerd off PPI  The onset was gradual    The illness is also significant for back pain  Significant associated medical issues include GERD and gastric bypass  Chief Complaint   Patient presents with   • GI Problem     Needs new prescription for her omeprazole, states same issues with abdomen are occurring, pt used to get back injections would like something to use PRN for the pain, needs a note stating she wears a back support for work Would also like a as needed medication she can take during the day for allergies      Review of Systems   Constitutional: Negative for fatigue (on/off)  Respiratory: Negative for shortness of breath  Gastrointestinal: Nausea: gerd off PPI  Egd reviewed   Musculoskeletal: Positive for arthralgias and back pain       FINDINGS:SEPTEMBER 2021  · Mild, generalized erythematous mucosa in the stomach; performed 2 cold forceps biopsies to rule out H  pylori  · Multiple large, superficial, irregular, benign-appearing ulcers in the proximal jejunum with clean base (Grzegorz III)     Past Medical History:   Diagnosis Date   • Abdominal mass, RLQ (right lower quadrant)     last assessed 6/25/2015   • Allergic rhinitis     last assessed 8/14/2012   • Carpal tunnel syndrome    • Chronic left shoulder pain    • Chronic pain disorder     left shoulder   • Epigastric pain    • Fracture of ankle     last assessed 10/21/2013   • Frequent headaches    • Gall stones    • GERD (gastroesophageal reflux disease)    • Hiatal hernia     last assessed 8/14/2012   • Left supraspinatus tendinitis    • Obesity surgery status    • Onychomycosis    • Osteopenia    • Plantar fasciitis    • Postgastrectomy malabsorption    • RUQ pain    • Sacroiliitis ([address] )      Past Surgical History:   Procedure Laterality Date   • ABDOMINAL SURGERY     • CHOLECYSTECTOMY     • GALLBLADDER SURGERY     • GASTRIC BYPASS     • HERNIA REPAIR     • CA EGD TRANSORAL BIOPSY SINGLE/MULTIPLE N/A 4/4/2018    Procedure: ESOPHAGOGASTRODUODENOSCOPY (EGD); Surgeon: Al Sanchez MD;  Location: [facility];   Service: Bariatrics   • ROTATOR CUFF REPAIR     • SHOULDER SURGERY       Family History   Problem Relation Age of Onset   • Osteoporosis Family    • Stroke Father         syndrome     Social History     Socioeconomic History   • Marital status: Significant Other     Spouse name: None   • Number of children: None   • Years of education: None   • Highest education level: None   Occupational History   • None   Tobacco Use   • Smoking status: Current Some Day Smoker     Types: Cigars   • Smokeless tobacco: Never Used   • Tobacco comment: smokes 3 cloves per day  Per allscripts 'never a smoker'   Vaping Use   • Vaping Use: Never used   Substance and Sexual Activity   • Alcohol use: Yes     Comment: 3-4 shots 5 times per week   • Drug use: No   • Sexual activity: Yes     Partners: Female     Comment: without practicing 'safer sex'   Other Topics Concern   • None   Social History Narrative    Single     Social Determinants of Health     Financial Resource Strain: Not on file   Food Insecurity: Not on file   Transportation Needs: Not on file   Physical Activity: Not on file   Stress: Not on file   Social Connections: Not on file   Intimate Partner Violence: Not on file   Housing Stability: Not on file     Current Outpatient Medications on File Prior to Visit   Medication Sig   • Calcium Citrate 1040 MG TABS Take by mouth    • Cholecalciferol 50 MCG (2000 UT) CAPS Take 4 capsules by mouth   • Cyanocobalamin (VITAMIN B-12) 1000 MCG SUBL Place under the tongue   • ergocalciferol (VITAMIN D2) 50,000 units Take 1 capsule by mouth once a week   • Multiple Vitamins-Minerals (HAIR/SKIN/NAILS/BIOTIN) TABS Take by mouth   • vitamin E, tocopherol, 400 units capsule Take 400 Units by mouth   • EPINEPHrine (EpiPen 2-Nura) 0 3 mg/0 3 mL SOAJ Inject 0 3 mL (0 3 mg total) into a muscle once for 1 dose   • sucralfate (CARAFATE) 1 g/10 mL suspension Take 10 mL (1 g total) by mouth 4 (four) times a day     Allergies   Allergen Reactions   • Crab Extract Allergy Skin Test - Food Allergy      Lips swell   • Penicillins    • Pollen Extract    • Amoxicillin Rash     Face swells     Immunization History   Administered Date(s) Administered   • COVID-19 PFIZER VACCINE 0 3 ML IM 05/08/2021, 05/29/2021   • INFLUENZA 10/22/2015, 10/17/2018   • Influenza Quadrivalent Preservative Free 3 years and older IM 10/22/2015   • Influenza, injectable, quadrivalent, preservative free 0 5 mL 10/19/2022   • Tdap 06/14/2018       Objective     /82   Pulse 74   Temp (!) 96 °F (35 6 °C) (Temporal)   Ht 5' 1 5" (1 562 m)   Wt 77 1 kg (170 lb)   SpO2 98%   BMI 31 60 kg/m²     Physical Exam  Constitutional:       Appearance: Normal appearance  She is obese  Cardiovascular:      Rate and Rhythm: Normal rate and regular rhythm  Pulses: Normal pulses        Heart sounds: Normal heart sounds  Pulmonary:      Effort: Pulmonary effort is normal       Breath sounds: Normal breath sounds  Abdominal:      Palpations: Abdomen is soft  Musculoskeletal:      Comments: Paraspinal spasm   Neurological:      Mental Status: She is alert and oriented to person, place, and time  Psychiatric:         Mood and Affect: Mood normal          Behavior: Behavior normal          Thought Content:  Thought content normal          Judgment: Judgment normal        [facility], DO

## 2022-10-19 NOTE — LETTER
October 19, 2022     Echo RAYA  Christy Sersharlene    Patient: Echo James   YOB: 1970   Date of Visit: 10/19/2022       Dear Dr Christy Brandon:     Echo Brandon is a 17-year-old female who suffers from chronic low back pain specifically sacroiliac dysfunction due to the affects of her duty belt during work  It is my medical opinion and recommendation that modification of this equipment/duty belt be strongly considered  If you have questions, please do not hesitate to call me  I look forward to following April along with you           Sincerely,        Ivone Trent, DO

## 2022-10-26 DIAGNOSIS — B37.9 YEAST INFECTION: Primary | ICD-10-CM

## 2022-10-26 RX ORDER — FLUCONAZOLE 150 MG/1
150 TABLET ORAL ONCE
Qty: 1 TABLET | Refills: 0 | Status: SHIPPED | OUTPATIENT
Start: 2022-10-26 | End: 2022-10-26

## 2022-10-26 NOTE — TELEPHONE ENCOUNTER
Patient called, having yeast infection symptoms, requesting an Rx for Diflucan to [address]  Please let patient know when completed

## 2022-11-07 NOTE — PROGRESS NOTES
Assessment/Plan:    Recommended monthly SBE, annual CBE and the importance of annual screening mammo to prevent undiagnosed breast cancer reviewed  ASCCP guidelines reviewed and pap with cotesting done today  If insufficient cells, may need anti anxiety med for repeat pap  Cologuard ordered by PCP  STI testing ordered and reviewed  Reviewed diet/activity recommendations Calcium 1200 mg and Vit D 600-1000 IU daily  Discussed postmenopausal considerations and symptoms to report  Kegel exercises as instructed  RTO in one year for routine annual gyn exam or sooner PRN  Diagnoses and all orders for this visit:    Encounter for gynecological examination (general) (routine) without abnormal findings        Subjective:      Patient ID: April L Román Tinsley is a 50 y o  female  This patient presents for routine annual gyn exam   She denies  bleeding or spotting, VM sx, pelvic pain, dyspareunia, breast concerns, abnormal discharge, bowel/bladder dysfunction, depression/anx  Sexually active, female partner  Requests STI testing  No sx  Pap/HPV, LSIL with neg HPV, 2019  Advised to return in 1 year for repeat pap, presents today  Mammography last done 2017, ordered  Cologuard ordered by PCP  The following portions of the patient's history were reviewed and updated as appropriate: allergies, current medications, past family history, past medical history, past social history, past surgical history and problem list     Review of Systems   Constitutional: Negative  Respiratory: Negative  Cardiovascular: Negative  Gastrointestinal: Negative  Endocrine: Negative  Genitourinary: Negative for dyspareunia, dysuria, frequency, pelvic pain, urgency, vaginal bleeding, vaginal discharge and vaginal pain  Musculoskeletal: Negative  Skin: Negative  Neurological: Negative  Psychiatric/Behavioral: Negative            Objective:      /72   Pulse 76   Ht 5' 1 5" (1 562 m)   Wt 76 6 kg (168 lb 12 8 oz)   BMI 31 38 kg/m²          Physical Exam  Vitals and nursing note reviewed  Exam conducted with a chaperone present  Constitutional:       Appearance: Normal appearance  She is well-developed  HENT:      Head: Normocephalic and atraumatic  Neck:      Thyroid: No thyroid mass or thyromegaly  Cardiovascular:      Rate and Rhythm: Normal rate and regular rhythm  Heart sounds: Normal heart sounds  Pulmonary:      Effort: Pulmonary effort is normal       Breath sounds: Normal breath sounds  Chest:   Breasts: Breasts are symmetrical       Right: No inverted nipple, mass, nipple discharge, skin change or tenderness  Left: No inverted nipple, mass, nipple discharge, skin change or tenderness  Abdominal:      General: Bowel sounds are normal       Palpations: Abdomen is soft  Tenderness: There is no abdominal tenderness  Hernia: There is no hernia in the left inguinal area or right inguinal area  Genitourinary:     General: Normal vulva  Exam position: Supine  Pubic Area: No rash  Labia:         Right: No rash, tenderness, lesion or injury  Left: No rash, tenderness, lesion or injury  Urethra: No prolapse, urethral pain, urethral swelling or urethral lesion  Vagina: Normal  No signs of injury and foreign body  No vaginal discharge, erythema, tenderness, bleeding, lesions or prolapsed vaginal walls  Cervix: No cervical motion tenderness, discharge, friability, lesion, erythema, cervical bleeding or eversion  Uterus: Not deviated, not enlarged, not fixed, not tender and no uterine prolapse  Adnexa:         Right: No mass, tenderness or fullness  Left: No mass, tenderness or fullness  Rectum: No external hemorrhoid  Comments: Urethra normal without lesions  No bladder tenderness  Difficult, limited pap secondary to anxiety  Musculoskeletal:         General: Normal range of motion        Cervical back: Normal range of motion and neck supple  Lymphadenopathy:      Lower Body: No right inguinal adenopathy  No left inguinal adenopathy  Skin:     General: Skin is warm and dry  Neurological:      Mental Status: She is alert and oriented to person, place, and time  Psychiatric:         Speech: Speech normal          Behavior: Behavior normal  Behavior is cooperative

## 2022-11-09 ENCOUNTER — OFFICE VISIT (OUTPATIENT)
Dept: GYNECOLOGY | Facility: CLINIC | Age: 52
End: 2022-11-09

## 2022-11-09 VITALS
BODY MASS INDEX: 31.06 KG/M2 | HEART RATE: 76 BPM | DIASTOLIC BLOOD PRESSURE: 72 MMHG | WEIGHT: 168.8 LBS | SYSTOLIC BLOOD PRESSURE: 110 MMHG | HEIGHT: 62 IN

## 2022-11-09 DIAGNOSIS — Z11.3 SCREENING EXAMINATION FOR STD (SEXUALLY TRANSMITTED DISEASE): ICD-10-CM

## 2022-11-09 DIAGNOSIS — Z01.419 ENCOUNTER FOR GYNECOLOGICAL EXAMINATION (GENERAL) (ROUTINE) WITHOUT ABNORMAL FINDINGS: Primary | ICD-10-CM

## 2022-11-09 DIAGNOSIS — Z12.4 ENCOUNTER FOR PAPANICOLAOU SMEAR FOR CERVICAL CANCER SCREENING: ICD-10-CM

## 2022-11-10 ENCOUNTER — HOSPITAL ENCOUNTER (OUTPATIENT)
Dept: MAMMOGRAPHY | Facility: MEDICAL CENTER | Age: 52
Discharge: HOME | End: 2022-11-10

## 2022-11-10 ENCOUNTER — APPOINTMENT (OUTPATIENT)
Dept: LAB | Facility: CLINIC | Age: 52
End: 2022-11-10

## 2022-11-10 VITALS — HEIGHT: 62 IN | WEIGHT: 168.87 LBS | BODY MASS INDEX: 31.08 KG/M2

## 2022-11-10 DIAGNOSIS — E53.8 VITAMIN B12 DEFICIENCY: ICD-10-CM

## 2022-11-10 DIAGNOSIS — Z98.84 BARIATRIC SURGERY STATUS: ICD-10-CM

## 2022-11-10 DIAGNOSIS — Z11.3 SCREENING EXAMINATION FOR STD (SEXUALLY TRANSMITTED DISEASE): ICD-10-CM

## 2022-11-10 DIAGNOSIS — E55.9 VITAMIN D DEFICIENCY: ICD-10-CM

## 2022-11-10 DIAGNOSIS — I10 ESSENTIAL HYPERTENSION: ICD-10-CM

## 2022-11-10 DIAGNOSIS — Z12.31 ENCOUNTER FOR SCREENING MAMMOGRAM FOR MALIGNANT NEOPLASM OF BREAST: ICD-10-CM

## 2022-11-10 LAB
25(OH)D3 SERPL-MCNC: 13.8 NG/ML (ref 30–100)
ALBUMIN SERPL BCP-MCNC: 4.1 G/DL (ref 3.5–5)
ALP SERPL-CCNC: 93 U/L (ref 34–104)
ALT SERPL W P-5'-P-CCNC: 10 U/L (ref 7–52)
ANION GAP SERPL CALCULATED.3IONS-SCNC: 5 MMOL/L (ref 4–13)
AST SERPL W P-5'-P-CCNC: 15 U/L (ref 13–39)
BILIRUB SERPL-MCNC: 0.63 MG/DL (ref 0.2–1)
BUN SERPL-MCNC: 13 MG/DL (ref 5–25)
CALCIUM SERPL-MCNC: 8.9 MG/DL (ref 8.4–10.2)
CHLORIDE SERPL-SCNC: 104 MMOL/L (ref 96–108)
CHOLEST SERPL-MCNC: 163 MG/DL
CO2 SERPL-SCNC: 28 MMOL/L (ref 21–32)
CREAT SERPL-MCNC: 0.86 MG/DL (ref 0.6–1.3)
GFR SERPL CREATININE-BSD FRML MDRD: 79 ML/MIN/1.73SQ M
GLUCOSE P FAST SERPL-MCNC: 93 MG/DL (ref 65–99)
HBV SURFACE AG SER QL: NORMAL
HDLC SERPL-MCNC: 75 MG/DL
LDLC SERPL CALC-MCNC: 74 MG/DL (ref 0–100)
POTASSIUM SERPL-SCNC: 4 MMOL/L (ref 3.5–5.3)
PROT SERPL-MCNC: 7.3 G/DL (ref 6.4–8.4)
RPR SER QL: NORMAL
SODIUM SERPL-SCNC: 137 MMOL/L (ref 135–147)
TRIGL SERPL-MCNC: 68 MG/DL
TSH SERPL DL<=0.05 MIU/L-ACNC: 2.59 UIU/ML (ref 0.45–4.5)
VIT B12 SERPL-MCNC: 409 PG/ML (ref 100–900)

## 2022-11-11 LAB — HIV 1+2 AB+HIV1 P24 AG SERPL QL IA: NORMAL

## 2022-11-12 LAB
C TRACH DNA SPEC QL NAA+PROBE: NEGATIVE
HPV HR 12 DNA CVX QL NAA+PROBE: NEGATIVE
HPV16 DNA CVX QL NAA+PROBE: NEGATIVE
HPV18 DNA CVX QL NAA+PROBE: NEGATIVE
N GONORRHOEA DNA SPEC QL NAA+PROBE: NEGATIVE

## 2022-11-17 LAB
LAB AP GYN PRIMARY INTERPRETATION: ABNORMAL
Lab: ABNORMAL
PATH INTERP SPEC-IMP: ABNORMAL

## 2022-11-21 ENCOUNTER — TELEPHONE (OUTPATIENT)
Dept: GYNECOLOGY | Facility: CLINIC | Age: 52
End: 2022-11-21

## 2022-11-21 NOTE — TELEPHONE ENCOUNTER
Letter sent notifying patient of results  ----- Message from Chevy Wilkes sent at 11/21/2022 10:16 AM EST -----  Pls inform pt of ASCUS pap with neg HPV  Will need repap in 1 year

## 2022-12-06 ENCOUNTER — TELEPHONE (OUTPATIENT)
Dept: GYNECOLOGY | Facility: CLINIC | Age: 52
End: 2022-12-06

## 2022-12-12 ENCOUNTER — OFFICE VISIT (OUTPATIENT)
Dept: FAMILY MEDICINE CLINIC | Facility: CLINIC | Age: 52
End: 2022-12-12
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
## 2022-12-12 DIAGNOSIS — G89.29 CHRONIC RIGHT-SIDED LOW BACK PAIN WITH RIGHT-SIDED SCIATICA: ICD-10-CM

## 2022-12-12 DIAGNOSIS — M54.41 CHRONIC RIGHT-SIDED LOW BACK PAIN WITH RIGHT-SIDED SCIATICA: ICD-10-CM

## 2022-12-12 DIAGNOSIS — Z98.84 BARIATRIC SURGERY STATUS: Primary | ICD-10-CM

## 2022-12-12 RX ORDER — HYDROCODONE BITARTRATE AND ACETAMINOPHEN 5; 325 MG/1; MG/1
1 TABLET ORAL EVERY 6 HOURS PRN
Qty: 30 TABLET | Refills: 0 | Status: SHIPPED | OUTPATIENT
Start: 2022-12-12

## 2022-12-12 NOTE — LETTER
December 12, 2022     Patient: Imelda Metz  YOB: 1970  Date of Visit: 12/12/2022      To Whom it May Concern:    April Mahendra Cole is under my professional care  April was seen in my office on 12/12/2022  April may return to work 12/19  Pt will be absent from work starting today  If you have any questions or concerns, please don't hesitate to call           Sincerely,          Yaquelin Prieto, DO
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
## 2022-12-14 VITALS
BODY MASS INDEX: 30.36 KG/M2 | DIASTOLIC BLOOD PRESSURE: 78 MMHG | TEMPERATURE: 97.6 F | HEART RATE: 83 BPM | WEIGHT: 165 LBS | SYSTOLIC BLOOD PRESSURE: 122 MMHG | RESPIRATION RATE: 16 BRPM | OXYGEN SATURATION: 97 % | HEIGHT: 62 IN

## 2023-01-03 ENCOUNTER — VBI (OUTPATIENT)
Dept: ADMINISTRATIVE | Facility: OTHER | Age: 53
End: 2023-01-03

## 2023-01-06 ENCOUNTER — TELEPHONE (OUTPATIENT)
Dept: ADMINISTRATIVE | Facility: OTHER | Age: 53
End: 2023-01-06

## 2023-01-06 NOTE — TELEPHONE ENCOUNTER
01/06/23 10:11 AM    The patient was called and reminded about the open Cologuard order  Instructed to call the ordering provider's office if there are any questions about completing the CRC screen  Thank you    Darrell Orozco MA  [facility]

## 2023-05-23 DIAGNOSIS — I10 ESSENTIAL HYPERTENSION: ICD-10-CM

## 2023-05-23 RX ORDER — LOSARTAN POTASSIUM 100 MG/1
100 TABLET ORAL DAILY
Qty: 90 TABLET | Refills: 1 | Status: SHIPPED | OUTPATIENT
Start: 2023-05-23

## 2023-05-23 RX ORDER — AMLODIPINE BESYLATE 2.5 MG/1
2.5 TABLET ORAL DAILY
Qty: 90 TABLET | Refills: 1 | Status: SHIPPED | OUTPATIENT
Start: 2023-05-23

## 2023-06-15 ENCOUNTER — VBI (OUTPATIENT)
Dept: ADMINISTRATIVE | Facility: OTHER | Age: 53
End: 2023-06-15

## 2023-07-28 ENCOUNTER — TELEPHONE (OUTPATIENT)
Dept: FAMILY MEDICINE CLINIC | Facility: CLINIC | Age: 53
End: 2023-07-28

## 2023-07-28 ENCOUNTER — OFFICE VISIT (OUTPATIENT)
Dept: FAMILY MEDICINE CLINIC | Facility: CLINIC | Age: 53
End: 2023-07-28
Payer: COMMERCIAL

## 2023-07-28 VITALS
DIASTOLIC BLOOD PRESSURE: 80 MMHG | WEIGHT: 172.25 LBS | HEIGHT: 62 IN | BODY MASS INDEX: 31.7 KG/M2 | SYSTOLIC BLOOD PRESSURE: 124 MMHG | TEMPERATURE: 97.8 F

## 2023-07-28 DIAGNOSIS — J06.9 UPPER RESPIRATORY TRACT INFECTION, UNSPECIFIED TYPE: Primary | ICD-10-CM

## 2023-07-28 DIAGNOSIS — Z72.0 TOBACCO USE: ICD-10-CM

## 2023-07-28 DIAGNOSIS — E55.9 VITAMIN D DEFICIENCY: Primary | ICD-10-CM

## 2023-07-28 DIAGNOSIS — K21.9 GASTROESOPHAGEAL REFLUX DISEASE, UNSPECIFIED WHETHER ESOPHAGITIS PRESENT: ICD-10-CM

## 2023-07-28 DIAGNOSIS — K21.9 GERD (GASTROESOPHAGEAL REFLUX DISEASE): ICD-10-CM

## 2023-07-28 DIAGNOSIS — B37.2 CANDIDAL SKIN INFECTION: ICD-10-CM

## 2023-07-28 DIAGNOSIS — I10 PRIMARY HYPERTENSION: ICD-10-CM

## 2023-07-28 PROCEDURE — 99214 OFFICE O/P EST MOD 30 MIN: CPT | Performed by: FAMILY MEDICINE

## 2023-07-28 RX ORDER — NYSTATIN 100000 [USP'U]/G
POWDER TOPICAL 3 TIMES DAILY
Qty: 15 G | Refills: 0 | Status: SHIPPED | OUTPATIENT
Start: 2023-07-28

## 2023-07-28 RX ORDER — OMEPRAZOLE 40 MG/1
40 CAPSULE, DELAYED RELEASE ORAL DAILY
Qty: 90 CAPSULE | Refills: 0 | Status: SHIPPED | OUTPATIENT
Start: 2023-07-28

## 2023-07-28 RX ORDER — AZITHROMYCIN 250 MG/1
TABLET, FILM COATED ORAL
Qty: 6 TABLET | Refills: 0 | Status: CANCELLED | OUTPATIENT
Start: 2023-07-28 | End: 2023-08-02

## 2023-07-28 NOTE — PROGRESS NOTES
Name: Joselyn Gardner      : 1970      MRN: [id number]  Encounter Provider: Herlinda Rojo MD  Encounter Date: 2023   Encounter department: [address] [place] [address]     Chief Complaint   Patient presents with   • Sore Throat     Also,pain right lower jaw-started on 2023-cough began 2023   • Rash     Left armpit     Rapid COVID negative in office, rapid strep negative in office. Most likely viral.  We will send Magic mouthwash for symptomatic relief. Patient to call back if any worsening. We will send nystatin powder for candidal skin infection of left axilla. Keep area as dry as possible. Blood pressure 124/80, under good control. Continue medication regimen as prescribed. Continue omeprazole for acid reflux. Recently quit smoking. RTC as needed    1. Upper respiratory tract infection, unspecified type    2. Gastroesophageal reflux disease, unspecified whether esophagitis present  -     al mag oxide-diphenhydramine-lidocaine viscous (MAGIC MOUTHWASH) 1:1:1 suspension; Swish and swallow 10 mL every 4 (four) hours as needed for mouth pain or discomfort    3. Tobacco use    4. Candidal skin infection  -     nystatin (MYCOSTATIN) powder; Apply topically 3 (three) times a day    5. Primary hypertension    6. GERD (gastroesophageal reflux disease)  -     omeprazole (PriLOSEC) 40 MG capsule; Take 1 capsule (40 mg total) by mouth daily           Subjective      She presents today with a weeklong history of sore throat, cough. States that she also has a rash on her left armpit. Denies any fevers. Recently quit smoking. Review of Systems   Constitutional: Negative for activity change, appetite change, chills, fatigue and fever. HENT: Positive for sore throat. Negative for congestion, rhinorrhea and sneezing. Eyes: Negative for pain, discharge, redness and itching. Respiratory: Positive for cough.  Negative for chest tightness, shortness of breath and wheezing. Cardiovascular: Negative for chest pain and palpitations. Gastrointestinal: Negative for abdominal distention, abdominal pain, constipation, diarrhea, nausea and vomiting. Musculoskeletal: Negative for arthralgias, back pain, joint swelling and myalgias. Skin: Positive for rash (left axilla). Neurological: Negative for dizziness, weakness, numbness and headaches. Hematological: Negative for adenopathy. Psychiatric/Behavioral: Negative for confusion. All other systems reviewed and are negative.       Current Outpatient Medications on File Prior to Visit   Medication Sig   • amLODIPine (NORVASC) 2.5 mg tablet Take 1 tablet (2.5 mg total) by mouth daily   • Calcium Citrate 1040 MG TABS Take by mouth    • Cholecalciferol 50 MCG (2000 UT) CAPS Take 4 capsules by mouth   • Cyanocobalamin (VITAMIN B-12) 1000 MCG SUBL Place under the tongue   • liraglutide (SAXENDA) injection Inject 0.1 mL (0.6 mg total) under the skin daily Titrate per schedule   • losartan (COZAAR) 100 MG tablet Take 1 tablet (100 mg total) by mouth daily   • Multiple Vitamins-Minerals (HAIR/SKIN/NAILS/BIOTIN) TABS Take by mouth   • vitamin E, tocopherol, 400 units capsule Take 400 Units by mouth   • [DISCONTINUED] al mag oxide-diphenhydramine-lidocaine viscous (MAGIC MOUTHWASH) 1:1:1 suspension Swish and swallow 10 mL every 4 (four) hours as needed (dyspepsia)   • [DISCONTINUED] omeprazole (PriLOSEC) 40 MG capsule Take 1 capsule (40 mg total) by mouth daily   • EPINEPHrine (EpiPen 2-Nura) 0.3 mg/0.3 mL SOAJ Inject 0.3 mL (0.3 mg total) into a muscle once for 1 dose   • ergocalciferol (VITAMIN D2) 50,000 units Take 1 capsule by mouth once a week (Patient not taking: Reported on 7/28/2023)   • HYDROcodone-acetaminophen (NORCO) 5-325 mg per tablet Take 1 tablet by mouth every 6 (six) hours as needed for pain Max Daily Amount: 4 tablets (Patient not taking: Reported on 7/28/2023)   • sucralfate (CARAFATE) 1 g/10 mL suspension Take 10 mL (1 g total) by mouth 4 (four) times a day       Objective     /80 (BP Location: Left arm, Patient Position: Sitting, Cuff Size: Large)   Temp 97.8 °F (36.6 °C)   Ht 5' 2" (1.575 m)   Wt 78.1 kg (172 lb 4 oz)   BMI 31.50 kg/m²     Physical Exam  Vitals reviewed. Constitutional:       General: She is not in acute distress. Appearance: Normal appearance. She is well-developed. She is not toxic-appearing or diaphoretic. HENT:      Head: Normocephalic and atraumatic. Right Ear: External ear normal.      Left Ear: External ear normal.      Nose: Nose normal.      Mouth/Throat:      Mouth: Mucous membranes are moist.      Pharynx: Posterior oropharyngeal erythema present. No pharyngeal swelling or oropharyngeal exudate. Eyes:      General: No scleral icterus. Right eye: No discharge. Left eye: No discharge. Conjunctiva/sclera: Conjunctivae normal.   Cardiovascular:      Rate and Rhythm: Normal rate and regular rhythm. Pulses: Normal pulses. Heart sounds: Normal heart sounds. No murmur heard. Pulmonary:      Effort: Pulmonary effort is normal. No respiratory distress. Breath sounds: Normal breath sounds. No wheezing. Abdominal:      General: Abdomen is flat. There is no distension. Palpations: Abdomen is soft. There is no mass. Tenderness: There is no abdominal tenderness. Hernia: No hernia is present. Musculoskeletal:         General: No tenderness. Normal range of motion. Cervical back: Normal range of motion. Skin:     General: Skin is warm and dry. Capillary Refill: Capillary refill takes less than 2 seconds. Coloration: Skin is not pale. Findings: Rash (left axilla candidal infection) present. No erythema. Neurological:      General: No focal deficit present. Mental Status: She is alert.    Psychiatric:         Mood and Affect: Mood normal.         Behavior: Behavior normal.       Patricia Aguilar MD

## 2023-07-31 ENCOUNTER — TELEPHONE (OUTPATIENT)
Dept: FAMILY MEDICINE CLINIC | Facility: CLINIC | Age: 53
End: 2023-07-31

## 2023-07-31 DIAGNOSIS — J02.9 SORE THROAT: Primary | ICD-10-CM

## 2023-07-31 DIAGNOSIS — B37.9 YEAST INFECTION: ICD-10-CM

## 2023-07-31 RX ORDER — AZITHROMYCIN 250 MG/1
TABLET, FILM COATED ORAL
Qty: 6 TABLET | Refills: 0 | Status: SHIPPED | OUTPATIENT
Start: 2023-07-31 | End: 2023-08-04

## 2023-07-31 RX ORDER — FLUCONAZOLE 150 MG/1
150 TABLET ORAL ONCE
Qty: 1 TABLET | Refills: 1 | Status: SHIPPED | OUTPATIENT
Start: 2023-07-31 | End: 2023-07-31

## 2023-08-02 ENCOUNTER — TELEPHONE (OUTPATIENT)
Dept: FAMILY MEDICINE CLINIC | Facility: CLINIC | Age: 53
End: 2023-08-02

## 2023-08-10 ENCOUNTER — OFFICE VISIT (OUTPATIENT)
Dept: FAMILY MEDICINE CLINIC | Facility: CLINIC | Age: 53
End: 2023-08-10
Payer: COMMERCIAL

## 2023-08-10 VITALS
OXYGEN SATURATION: 99 % | SYSTOLIC BLOOD PRESSURE: 130 MMHG | TEMPERATURE: 96 F | HEART RATE: 68 BPM | BODY MASS INDEX: 32.2 KG/M2 | DIASTOLIC BLOOD PRESSURE: 78 MMHG | HEIGHT: 62 IN | WEIGHT: 175 LBS

## 2023-08-10 DIAGNOSIS — L30.9 ECZEMA, UNSPECIFIED TYPE: ICD-10-CM

## 2023-08-10 DIAGNOSIS — L73.9 FOLLICULITIS: ICD-10-CM

## 2023-08-10 DIAGNOSIS — R21 RASH IN ADULT: Primary | ICD-10-CM

## 2023-08-10 DIAGNOSIS — B35.6 TINEA CRURIS: ICD-10-CM

## 2023-08-10 PROCEDURE — 99213 OFFICE O/P EST LOW 20 MIN: CPT | Performed by: FAMILY MEDICINE

## 2023-08-10 RX ORDER — LEVOFLOXACIN 500 MG/1
500 TABLET, FILM COATED ORAL EVERY 24 HOURS
Qty: 7 TABLET | Refills: 0 | Status: SHIPPED | OUTPATIENT
Start: 2023-08-10 | End: 2023-08-17

## 2023-08-10 RX ORDER — CLOTRIMAZOLE AND BETAMETHASONE DIPROPIONATE 10; .64 MG/G; MG/G
CREAM TOPICAL 2 TIMES DAILY
Qty: 45 G | Refills: 1 | Status: SHIPPED | OUTPATIENT
Start: 2023-08-10

## 2023-08-10 NOTE — PATIENT INSTRUCTIONS
Start levaquin for left armpit folliculitis and lotrisone cream for tinea crurus and rash. Rec staying well hydrated and call if not better and consider dermatology consult. Do not scratch areas of skin in left armpit.

## 2023-08-10 NOTE — PROGRESS NOTES
Name: Khalida Tristan      : 1970      MRN: [id number]  Encounter Provider: Gloria Mike DO  Encounter Date: 8/10/2023   Encounter department: [address] [place] [address]  Chief Complaint   Patient presents with   • Rash     Rash on/ under arm left arm that's is spreading was treated with Nystatin and no improvement. Is painful and itchy   2 weeks      Patient Instructions   Start levaquin for left armpit folliculitis and lotrisone cream for tinea crurus and rash. Rec staying well hydrated and call if not better and consider dermatology consult. Do not scratch areas of skin in left armpit. Assessment & Plan     1. Rash in adult  -     clotrimazole-betamethasone (LOTRISONE) 1-0.05 % cream; Apply topically 2 (two) times a day  -     levofloxacin (LEVAQUIN) 500 mg tablet; Take 1 tablet (500 mg total) by mouth every 24 hours for 7 days    2. Tinea cruris  -     clotrimazole-betamethasone (LOTRISONE) 1-0.05 % cream; Apply topically 2 (two) times a day    3. Eczema, unspecified type    4. Folliculitis           Subjective      Rash (Rash on/ under arm left arm that's is spreading was treated with Nystatin and no improvement. Is painful and itchy   2 weeks )      Rash      Review of Systems   Constitutional: Negative. HENT: Negative. Eyes: Negative. Respiratory: Negative. Cardiovascular: Negative. Gastrointestinal: Negative. Endocrine: Negative. Genitourinary: Negative. Musculoskeletal: Negative. Skin: Positive for rash (rash left armpit and arms b/l itchy in nature). Left armpit raised bumps/folliculitis   Allergic/Immunologic: Negative. Neurological: Negative. Hematological: Negative. Psychiatric/Behavioral: Negative.         Current Outpatient Medications on File Prior to Visit   Medication Sig   • al mag oxide-diphenhydramine-lidocaine viscous (MAGIC MOUTHWASH) 1:1:1 suspension Swish and swallow 10 mL every 4 (four) hours as needed for mouth pain or discomfort   • amLODIPine (NORVASC) 2.5 mg tablet Take 1 tablet (2.5 mg total) by mouth daily   • Calcium Citrate 1040 MG TABS Take by mouth    • Cholecalciferol 50 MCG (2000 UT) CAPS Take 4 capsules by mouth   • Cyanocobalamin (VITAMIN B-12) 1000 MCG SUBL Place under the tongue   • liraglutide (SAXENDA) injection Inject 0.1 mL (0.6 mg total) under the skin daily Titrate per schedule   • losartan (COZAAR) 100 MG tablet Take 1 tablet (100 mg total) by mouth daily   • Multiple Vitamins-Minerals (HAIR/SKIN/NAILS/BIOTIN) TABS Take by mouth   • nystatin (MYCOSTATIN) powder Apply topically 3 (three) times a day   • omeprazole (PriLOSEC) 40 MG capsule Take 1 capsule (40 mg total) by mouth daily   • vitamin E, tocopherol, 400 units capsule Take 400 Units by mouth   • EPINEPHrine (EpiPen 2-Nura) 0.3 mg/0.3 mL SOAJ Inject 0.3 mL (0.3 mg total) into a muscle once for 1 dose   • ergocalciferol (VITAMIN D2) 50,000 units Take 1 capsule by mouth once a week (Patient not taking: Reported on 7/28/2023)   • HYDROcodone-acetaminophen (NORCO) 5-325 mg per tablet Take 1 tablet by mouth every 6 (six) hours as needed for pain Max Daily Amount: 4 tablets (Patient not taking: Reported on 7/28/2023)   • sucralfate (CARAFATE) 1 g/10 mL suspension Take 10 mL (1 g total) by mouth 4 (four) times a day (Patient not taking: Reported on 8/10/2023)       Objective     /78 (BP Location: Left arm, Patient Position: Sitting, Cuff Size: Large)   Pulse 68   Temp (!) 96 °F (35.6 °C) (Temporal)   Ht 5' 2" (1.575 m)   Wt 79.4 kg (175 lb)   SpO2 99%   BMI 32.01 kg/m²     Physical Exam  Constitutional:       Appearance: She is well-developed. HENT:      Head: Normocephalic and atraumatic. Right Ear: External ear normal.      Left Ear: External ear normal.      Nose: Nose normal.   Eyes:      Conjunctiva/sclera: Conjunctivae normal.      Pupils: Pupils are equal, round, and reactive to light.    Cardiovascular:      Rate and Rhythm: Normal rate and regular rhythm. Heart sounds: Normal heart sounds. Pulmonary:      Effort: Pulmonary effort is normal.      Breath sounds: Normal breath sounds. Musculoskeletal:         General: Normal range of motion. Cervical back: Normal range of motion and neck supple. Skin:     General: Skin is warm and dry. Capillary Refill: Capillary refill takes less than 2 seconds. Findings: Lesion (left armpit folliculitis/pimple like rash) and rash (left armpit rash/tinea cruris) present. Neurological:      General: No focal deficit present. Mental Status: She is alert and oriented to person, place, and time. Mental status is at baseline. Deep Tendon Reflexes: Reflexes are normal and symmetric. Psychiatric:         Mood and Affect: Mood normal.         Behavior: Behavior normal.         Thought Content:  Thought content normal.         Judgment: Judgment normal.       Melanie Peralta,

## 2023-08-31 ENCOUNTER — RA CDI HCC (OUTPATIENT)
Dept: OTHER | Facility: HOSPITAL | Age: 53
End: 2023-08-31

## 2023-08-31 NOTE — PROGRESS NOTES
[facility] coding opportunities       Chart reviewed, no opportunity found: CHART REVIEWED, NO OPPORTUNITY FOUND        Patients Insurance        Commercial Insurance: Adrian Taylor

## 2023-09-08 ENCOUNTER — OFFICE VISIT (OUTPATIENT)
Dept: FAMILY MEDICINE CLINIC | Facility: CLINIC | Age: 53
End: 2023-09-08
Payer: COMMERCIAL

## 2023-09-08 VITALS
WEIGHT: 172.3 LBS | BODY MASS INDEX: 31.71 KG/M2 | HEART RATE: 88 BPM | OXYGEN SATURATION: 97 % | DIASTOLIC BLOOD PRESSURE: 68 MMHG | HEIGHT: 62 IN | SYSTOLIC BLOOD PRESSURE: 138 MMHG | TEMPERATURE: 98.3 F | RESPIRATION RATE: 16 BRPM

## 2023-09-08 DIAGNOSIS — I10 ESSENTIAL HYPERTENSION: ICD-10-CM

## 2023-09-08 DIAGNOSIS — Z12.31 SCREENING MAMMOGRAM, ENCOUNTER FOR: ICD-10-CM

## 2023-09-08 DIAGNOSIS — Z00.00 ANNUAL PHYSICAL EXAM: Primary | ICD-10-CM

## 2023-09-08 DIAGNOSIS — M47.816 LUMBAR SPONDYLOSIS: ICD-10-CM

## 2023-09-08 DIAGNOSIS — M47.816 FACET ARTHRITIS OF LUMBAR REGION: ICD-10-CM

## 2023-09-08 DIAGNOSIS — K21.9 GERD (GASTROESOPHAGEAL REFLUX DISEASE): ICD-10-CM

## 2023-09-08 DIAGNOSIS — Z98.84 BARIATRIC SURGERY STATUS: ICD-10-CM

## 2023-09-08 PROCEDURE — 99396 PREV VISIT EST AGE 40-64: CPT | Performed by: FAMILY MEDICINE

## 2023-09-08 RX ORDER — OMEPRAZOLE 40 MG/1
40 CAPSULE, DELAYED RELEASE ORAL DAILY
Qty: 90 CAPSULE | Refills: 0 | Status: SHIPPED | OUTPATIENT
Start: 2023-09-08

## 2023-09-08 RX ORDER — AMLODIPINE BESYLATE 2.5 MG/1
2.5 TABLET ORAL DAILY
Qty: 90 TABLET | Refills: 1 | Status: SHIPPED | OUTPATIENT
Start: 2023-09-08

## 2023-09-08 RX ORDER — LOSARTAN POTASSIUM 100 MG/1
100 TABLET ORAL DAILY
Qty: 90 TABLET | Refills: 1 | Status: SHIPPED | OUTPATIENT
Start: 2023-09-08

## 2023-09-08 NOTE — PROGRESS NOTES
ADULT ANNUAL [facility] [address] [facility]    NAME: Echo Agustin  AGE: 48 y.o. SEX: female  : 1970     DATE: 2023     Assessment and Plan:   April was seen today for back pain and physical exam.    Diagnoses and all orders for this visit:    Annual physical exam    GERD (gastroesophageal reflux disease)  -     omeprazole (PriLOSEC) 40 MG capsule; Take 1 capsule (40 mg total) by mouth daily    Essential hypertension  -     amLODIPine (NORVASC) 2.5 mg tablet; Take 1 tablet (2.5 mg total) by mouth daily  -     losartan (COZAAR) 100 MG tablet; Take 1 tablet (100 mg total) by mouth daily    Bariatric surgery status  -     Discontinue: liraglutide (SAXENDA) injection; Inject 0.1 mL (0.6 mg total) under the skin daily  -     liraglutide (SAXENDA) injection; Inject 0.1 mL (0.6 mg total) under the skin daily    BMI 30.0-30.9,adult  -     Discontinue: liraglutide (SAXENDA) injection; Inject 0.1 mL (0.6 mg total) under the skin daily  -     liraglutide (SAXENDA) injection;  Inject 0.1 mL (0.6 mg total) under the skin daily    Lumbar spondylosis  -     MRI lumbar spine wo contrast; Future    Facet arthritis of lumbar region  -     MRI lumbar spine wo contrast; Future    Screening mammogram, encounter for  -     Mammo screening bilateral w 3d & cad; Future      Problem List Items Addressed This Visit        Digestive    GERD (gastroesophageal reflux disease)    Relevant Medications    omeprazole (PriLOSEC) 40 MG capsule       Other    Bariatric surgery status    Relevant Medications    liraglutide (SAXENDA) injection   Other Visit Diagnoses     Annual physical exam    -  Primary    Essential hypertension        Relevant Medications    amLODIPine (NORVASC) 2.5 mg tablet    losartan (COZAAR) 100 MG tablet    BMI 30.0-30.9,adult        Relevant Medications    liraglutide (SAXENDA) injection    Lumbar spondylosis        Relevant Orders    MRI lumbar spine wo contrast    Facet arthritis of lumbar region        Relevant Orders    MRI lumbar spine wo contrast    Screening mammogram, encounter for        Relevant Orders    Mammo screening bilateral w 3d & cad          Immunizations and preventive care screenings were discussed with patient today. Appropriate education was printed on patient's after visit summary. Counseling:  Alcohol/drug use: discussed moderation in alcohol intake, the recommendations for healthy alcohol use  Dental Health: discussed importance of regular tooth brushing, flossing, and dental visits. Injury prevention: discussed safety/seat belts, safety helmets, smoke detectors, carbon dioxide detectors, and smoking near bedding or upholstery. · Sexual health: no issues  · Exercise: the importance of regular exercise/physical activity was discussed. Recommend exercise 5 times per week for at least 60 minutes. BMI Counseling: Body mass index is 31.51 kg/m². The BMI is above normal. Nutrition recommendations include decreasing portion sizes, encouraging healthy choices of fruits and vegetables, decreasing fast food intake, consuming healthier snacks, limiting drinks that contain sugar, moderation in carbohydrate intake, increasing intake of lean protein, reducing intake of saturated and trans fat and reducing intake of cholesterol. Exercise recommendations include exercising 3-5 times per week. Patient referred to PCP. Rationale for BMI follow-up plan is due to patient being overweight or obese. Depression Screening and Follow-up Plan: Patient was screened for depression during today's encounter. They screened negative with a PHQ-2 score of 0. No follow-ups on file.      Chief Complaint:     Chief Complaint   Patient presents with   • Back Pain     Discuss her lower back pain    • Physical Exam     Also due for annual      History of Present Illness:     Adult Annual Physical   Patient here for a comprehensive physical exam. The patient reports problems - Lower back pain. Would like to restart back on Saxenda    Diet and Physical Activity  · Diet/Nutrition: well balanced diet and trying. · Exercise: moderate cardiovascular exercise, strength training exercises and not at goal.      Depression Screening  PHQ-2/9 Depression Screening    Little interest or pleasure in doing things: 0 - not at all  Feeling down, depressed, or hopeless: 0 - not at all  PHQ-2 Score: 0  PHQ-2 Interpretation: Negative depression screen       General Health  · Sleep: sleeps well and back can interfer. · Hearing: normal - bilateral.  · Vision: most recent eye exam <1 year ago, wears glasses and wears contacts. · Dental: regular dental visits. /GYN Health  · Patient is: postmenopausal  · Last menstrual period: early  Menstrual History:  OB History        0    Para   0    Term   0            AB        Living           SAB        IAB        Ectopic        Multiple        Live Births                    Menarche age: 17  regular  No LMP recorded. Patient is postmenopausal.        Review of Systems:     Review of Systems   Musculoskeletal: Positive for back pain (Relatively longstanding but worsening). Patient has continued to do home exercise and stretching therapy physician directed based on [employer] spine conditioning literature. Her symptoms are central and bilaterally radiating to the buttocks. 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 from   At that time there was only mild facet arthropathy noted lower lumbar.   There was no stenosis either spinal or foraminal   Past Medical History:     Past Medical History:   Diagnosis Date   • Abdominal mass, RLQ (right lower quadrant)     last assessed 2015   • Allergic rhinitis     last assessed 8/14/2012   • Carpal tunnel syndrome    • Chronic left shoulder pain    • Chronic pain disorder     left shoulder   • Epigastric pain    • Fracture of ankle     last assessed 10/21/2013   • Frequent headaches    • Gall stones    • GERD (gastroesophageal reflux disease)    • Hiatal hernia     last assessed 8/14/2012   • Left supraspinatus tendinitis    • Obesity surgery status    • Onychomycosis    • Osteopenia    • Plantar fasciitis    • Postgastrectomy malabsorption    • RUQ pain    • Sacroiliitis (HCC)       Past Surgical History:     Past Surgical History:   Procedure Laterality Date   • ABDOMINAL SURGERY     • CHOLECYSTECTOMY     • GALLBLADDER SURGERY     • GASTRIC BYPASS     • HERNIA REPAIR     • VT EGD TRANSORAL BIOPSY SINGLE/MULTIPLE N/A 4/4/2018    Procedure: ESOPHAGOGASTRODUODENOSCOPY (EGD); Surgeon: Kody Rutherford MD;  Location: [facility];   Service: Bariatrics   • ROTATOR CUFF REPAIR     • SHOULDER SURGERY        Social History:     Social History     Socioeconomic History   • Marital status: Significant Other     Spouse name: None   • Number of children: None   • Years of education: None   • Highest education level: None   Occupational History   • None   Tobacco Use   • Smoking status: Former     Types: Cigars   • Smokeless tobacco: Never   Vaping Use   • Vaping Use: Never used   Substance and Sexual Activity   • Alcohol use: Not Currently     Comment: 3-4 shots 5 times per week   • Drug use: No   • Sexual activity: Yes     Partners: Female     Comment: without practicing 'safer sex'   Other Topics Concern   • None   Social History Narrative    Single     Social Determinants of Health     Financial Resource Strain: Not on file   Food Insecurity: Not on file   Transportation Needs: Not on file   Physical Activity: Not on file   Stress: Not on file   Social Connections: Not on file   Intimate Partner Violence: Not on file   Housing Stability: Not on file      Family History:     Family History Problem Relation Age of Onset   • Stroke Father         syndrome   • Osteoporosis Family       Current Medications:     Current Outpatient Medications   Medication Sig Dispense Refill   • al mag oxide-diphenhydramine-lidocaine viscous (MAGIC MOUTHWASH) 1:1:1 suspension Swish and swallow 10 mL every 4 (four) hours as needed for mouth pain or discomfort 120 mL 0   • amLODIPine (NORVASC) 2.5 mg tablet Take 1 tablet (2.5 mg total) by mouth daily 90 tablet 1   • Calcium Citrate 1040 MG TABS Take by mouth      • Cholecalciferol 50 MCG (2000 UT) CAPS Take 4 capsules by mouth     • clotrimazole-betamethasone (LOTRISONE) 1-0.05 % cream Apply topically 2 (two) times a day 45 g 1   • Cyanocobalamin (VITAMIN B-12) 1000 MCG SUBL Place under the tongue     • ergocalciferol (VITAMIN D2) 50,000 units Take 1 capsule by mouth once a week     • liraglutide (SAXENDA) injection Inject 0.1 mL (0.6 mg total) under the skin daily 3 mL 1   • losartan (COZAAR) 100 MG tablet Take 1 tablet (100 mg total) by mouth daily 90 tablet 1   • Multiple Vitamins-Minerals (HAIR/SKIN/NAILS/BIOTIN) TABS Take by mouth     • nystatin (MYCOSTATIN) powder Apply topically 3 (three) times a day 15 g 0   • omeprazole (PriLOSEC) 40 MG capsule Take 1 capsule (40 mg total) by mouth daily 90 capsule 0   • vitamin E, tocopherol, 400 units capsule Take 400 Units by mouth     • EPINEPHrine (EpiPen 2-Nura) 0.3 mg/0.3 mL SOAJ Inject 0.3 mL (0.3 mg total) into a muscle once for 1 dose 0.6 mL 1   • HYDROcodone-acetaminophen (NORCO) 5-325 mg per tablet Take 1 tablet by mouth every 6 (six) hours as needed for pain Max Daily Amount: 4 tablets (Patient not taking: Reported on 7/28/2023) 30 tablet 0   • liraglutide (SAXENDA) injection Inject 0.1 mL (0.6 mg total) under the skin daily Titrate per schedule (Patient not taking: Reported on 9/8/2023) 3 mL 2   • sucralfate (CARAFATE) 1 g/10 mL suspension Take 10 mL (1 g total) by mouth 4 (four) times a day (Patient not taking: Reported on 8/10/2023) 1200 mL 3     No current facility-administered medications for this visit. Allergies: Allergies   Allergen Reactions   • Crab Extract Allergy Skin Test - Food Allergy      Lips swell   • Penicillins    • Pollen Extract    • Amoxicillin Rash     Face swells      Physical Exam:     /68   Pulse 88   Temp 98.3 °F (36.8 °C) (Temporal)   Resp 16   Ht 5' 2" (1.575 m)   Wt 78.2 kg (172 lb 4.8 oz)   SpO2 97%   BMI 31.51 kg/m²     Physical Exam  Vitals and nursing note reviewed. Constitutional:       General: She is not in acute distress. Appearance: Normal appearance. She is well-developed. Comments: 57-year-old female who appears her stated age with BMI of 31%   HENT:      Head: Normocephalic. Right Ear: Tympanic membrane normal.      Left Ear: Tympanic membrane normal.      Nose: Nose normal.      Mouth/Throat:      Mouth: Mucous membranes are moist.   Cardiovascular:      Rate and Rhythm: Normal rate and regular rhythm. Heart sounds: No murmur heard. Pulmonary:      Effort: Pulmonary effort is normal. No respiratory distress. Breath sounds: Normal breath sounds. Abdominal:      Palpations: Abdomen is soft. Tenderness: There is no abdominal tenderness. Musculoskeletal:         General: No swelling. Comments: Increased lumbar lordosis with bilateral SI and lower lumbar paravertebral muscle spasm. Negative SLR negative SHMUEL ER but restricted range of motion in extension and sidebending range of motion worse to right   Skin:     Findings: No rash. Neurological:      Mental Status: She is alert and oriented to person, place, and time. Psychiatric:         Mood and Affect: Mood normal.         Behavior: Behavior normal.         Thought Content:  Thought content normal.         Judgment: Judgment normal.          Camille Carrera DO  [facility]

## 2023-11-15 NOTE — PROGRESS NOTES
Assessment/Plan:    Recommended monthly SBE, annual CBE and annual screening mammo. ASCCP guidelines reviewed and pap with cotesting done. Colon screening noted to be up to date. STI testing ordered and reviewed. Pt chose to have HSV added to testing. Reviewed diet/activity recommendations Calcium 1200 mg and Vit D 600-1000 IU daily. Discussed postmenopausal considerations and symptoms to report. Kegel exercises as instructed. RTO in one year for routine annual gyn exam or sooner PRN. Diagnoses and all orders for this visit:    Colon cancer screening  -     Ambulatory Referral to Gastroenterology; Future    Encounter for gynecological examination with Papanicolaou smear of cervix  -     Cancel: Liquid-based pap, screening  -     Liquid-based pap, screening    Routine screening for STI (sexually transmitted infection)  -     HIV 1/2 AG/AB w Reflex [facility] for 2 yr old and above; Future  -     Hepatitis B surface antigen; Future  -     RPR-Syphilis Screening (Total Syphilis IGG/IGM); Future  -     Herpes I/II IgG DREAD w Reflex to HSV-2; Future  -     Chlamydia/GC amplified DNA by PCR        Subjective:      Patient ID: April L Johnye Lesches is a 48 y.o. female. This patient presents for routine annual gyn exam.  She denies  bleeding or spotting, VM sx, pelvic pain, dyspareunia, breast concerns, abnormal discharge, bowel/bladder dysfunction, depression/anx. Sexually active, female partner. Requests STI testing. No sx. Pap/HPV, 11/9/22, ASCUS, -HPV. Will repeat. Mammography 11/10/22. Cologuard ordered by PCP. The following portions of the patient's history were reviewed and updated as appropriate: allergies, current medications, past family history, past medical history, past social history, past surgical history and problem list.    Review of Systems   Constitutional: Negative. Respiratory: Negative. Cardiovascular: Negative. Gastrointestinal: Negative. Endocrine: Negative. Genitourinary:  Negative for dysuria, frequency, pelvic pain, urgency, vaginal bleeding, vaginal discharge and vaginal pain. Musculoskeletal: Negative. Skin: Negative. Neurological: Negative. Psychiatric/Behavioral: Negative. Objective:      /80   Pulse 69   Ht 5' 2" (1.575 m)   Wt 79.4 kg (175 lb)   BMI 32.01 kg/m²          Physical Exam  Vitals and nursing note reviewed. Exam conducted with a chaperone present. Constitutional:       Appearance: Normal appearance. She is well-developed. HENT:      Head: Normocephalic and atraumatic. Neck:      Thyroid: No thyroid mass or thyromegaly. Cardiovascular:      Rate and Rhythm: Normal rate and regular rhythm. Heart sounds: Normal heart sounds. Pulmonary:      Effort: Pulmonary effort is normal.      Breath sounds: Normal breath sounds. Chest:   Breasts:     Breasts are symmetrical.      Right: No inverted nipple, mass, nipple discharge, skin change or tenderness. Left: No inverted nipple, mass, nipple discharge, skin change or tenderness. Abdominal:      General: Bowel sounds are normal.      Palpations: Abdomen is soft. Tenderness: There is no abdominal tenderness. Hernia: There is no hernia in the left inguinal area or right inguinal area. Genitourinary:     General: Normal vulva. Exam position: Supine. Pubic Area: No rash. Labia:         Right: No rash, tenderness, lesion or injury. Left: No rash, tenderness, lesion or injury. Urethra: No prolapse, urethral pain, urethral swelling or urethral lesion. Vagina: Normal. No signs of injury and foreign body. No vaginal discharge, erythema, tenderness, bleeding, lesions or prolapsed vaginal walls. Cervix: No cervical motion tenderness, discharge, friability, lesion, erythema, cervical bleeding or eversion. Uterus: Not deviated, not enlarged, not fixed, not tender and no uterine prolapse.        Adnexa: Right: No mass, tenderness or fullness. Left: No mass, tenderness or fullness. Rectum: No external hemorrhoid. Comments: Urethra normal without lesions  No bladder tenderness  Musculoskeletal:         General: Normal range of motion. Cervical back: Normal range of motion and neck supple. Lymphadenopathy:      Lower Body: No right inguinal adenopathy. No left inguinal adenopathy. Skin:     General: Skin is warm and dry. Neurological:      Mental Status: She is alert and oriented to person, place, and time. Psychiatric:         Speech: Speech normal.         Behavior: Behavior normal. Behavior is cooperative.

## 2023-11-20 ENCOUNTER — ANNUAL EXAM (OUTPATIENT)
Dept: GYNECOLOGY | Facility: CLINIC | Age: 53
End: 2023-11-20
Payer: COMMERCIAL

## 2023-11-20 VITALS
HEIGHT: 62 IN | DIASTOLIC BLOOD PRESSURE: 80 MMHG | HEART RATE: 69 BPM | SYSTOLIC BLOOD PRESSURE: 122 MMHG | BODY MASS INDEX: 32.2 KG/M2 | WEIGHT: 175 LBS

## 2023-11-20 DIAGNOSIS — Z01.419 ENCOUNTER FOR GYNECOLOGICAL EXAMINATION WITH PAPANICOLAOU SMEAR OF CERVIX: ICD-10-CM

## 2023-11-20 DIAGNOSIS — Z11.3 ROUTINE SCREENING FOR STI (SEXUALLY TRANSMITTED INFECTION): ICD-10-CM

## 2023-11-20 DIAGNOSIS — Z12.11 COLON CANCER SCREENING: Primary | ICD-10-CM

## 2023-11-20 PROCEDURE — G0145 SCR C/V CYTO,THINLAYER,RESCR: HCPCS | Performed by: STUDENT IN AN ORGANIZED HEALTH CARE EDUCATION/TRAINING PROGRAM

## 2023-11-20 PROCEDURE — G0124 SCREEN C/V THIN LAYER BY MD: HCPCS | Performed by: STUDENT IN AN ORGANIZED HEALTH CARE EDUCATION/TRAINING PROGRAM

## 2023-11-20 PROCEDURE — 87591 N.GONORRHOEAE DNA AMP PROB: CPT | Performed by: OBSTETRICS & GYNECOLOGY

## 2023-11-20 PROCEDURE — 87491 CHLMYD TRACH DNA AMP PROBE: CPT | Performed by: OBSTETRICS & GYNECOLOGY

## 2023-11-20 PROCEDURE — S0612 ANNUAL GYNECOLOGICAL EXAMINA: HCPCS | Performed by: OBSTETRICS & GYNECOLOGY

## 2023-11-20 PROCEDURE — G0476 HPV COMBO ASSAY CA SCREEN: HCPCS | Performed by: OBSTETRICS & GYNECOLOGY

## 2023-11-28 LAB
C TRACH DNA SPEC QL NAA+PROBE: NEGATIVE
N GONORRHOEA DNA SPEC QL NAA+PROBE: NEGATIVE

## 2023-12-01 ENCOUNTER — TELEPHONE (OUTPATIENT)
Dept: GYNECOLOGY | Facility: CLINIC | Age: 53
End: 2023-12-01

## 2023-12-01 LAB
LAB AP GYN PRIMARY INTERPRETATION: ABNORMAL
Lab: ABNORMAL
PATH INTERP SPEC-IMP: ABNORMAL

## 2023-12-01 NOTE — TELEPHONE ENCOUNTER
----- Message from Fabricio Gottlieb sent at 12/1/2023  9:56 AM EST -----  Pls advise pt of LSIL on pap. Needs colpo. Please schedule.

## 2023-12-01 NOTE — TELEPHONE ENCOUNTER
Mail box is full and can not receive messages   Letter was sent out to inform Pt. to call the office.   My Chart message also sent

## 2023-12-04 ENCOUNTER — TELEPHONE (OUTPATIENT)
Dept: GYNECOLOGY | Facility: CLINIC | Age: 53
End: 2023-12-04

## 2023-12-04 NOTE — TELEPHONE ENCOUNTER
Called pt to schedule colpo, vm box is full.  [employer] message sent to call the office to schedule colpo

## 2023-12-04 NOTE — TELEPHONE ENCOUNTER
----- Message from Alexander Lester, [address] [place] [address] sent at 12/4/2023  9:32 AM EST -----  Regarding: needs colpo  Pls call pt to schedule colpo

## 2024-01-11 ENCOUNTER — TELEPHONE (OUTPATIENT)
Dept: GYNECOLOGY | Facility: CLINIC | Age: 54
End: 2024-01-11

## 2024-01-11 NOTE — TELEPHONE ENCOUNTER
Called and Spoke to April She is scheduled for 3/4/2024    ----- Message from ALLIE Lobo sent at 1/10/2024 12:10 PM EST -----  Regarding: colpo needed  Pls send letter re: need for colpo ASAP

## 2024-03-06 ENCOUNTER — TELEPHONE (OUTPATIENT)
Dept: GYNECOLOGY | Facility: CLINIC | Age: 54
End: 2024-03-06

## 2024-03-06 NOTE — TELEPHONE ENCOUNTER
----- Message from ALLIE Lobo sent at 3/6/2024  7:58 AM EST -----  Regarding: nos colpo appt  Pls call pt to reschedule colpo. She was a no show for her visit 2 days ago

## 2024-04-09 ENCOUNTER — TELEPHONE (OUTPATIENT)
Dept: GYNECOLOGY | Facility: CLINIC | Age: 54
End: 2024-04-09

## 2024-04-09 NOTE — TELEPHONE ENCOUNTER
----- Message from ALLIE Lobo sent at 4/8/2024 10:40 AM EDT -----  Regarding: needs letter  Pls send letter #1. Needs colpo. No Showed appt.

## 2024-05-06 ENCOUNTER — TELEPHONE (OUTPATIENT)
Dept: GYNECOLOGY | Facility: CLINIC | Age: 54
End: 2024-05-06

## 2024-05-10 ENCOUNTER — VBI (OUTPATIENT)
Dept: ADMINISTRATIVE | Facility: OTHER | Age: 54
End: 2024-05-10

## 2024-05-15 ENCOUNTER — OFFICE VISIT (OUTPATIENT)
Dept: URGENT CARE | Age: 54
End: 2024-05-15
Payer: COMMERCIAL

## 2024-05-15 ENCOUNTER — APPOINTMENT (OUTPATIENT)
Dept: RADIOLOGY | Age: 54
End: 2024-05-15
Payer: COMMERCIAL

## 2024-05-15 VITALS
RESPIRATION RATE: 18 BRPM | DIASTOLIC BLOOD PRESSURE: 73 MMHG | SYSTOLIC BLOOD PRESSURE: 115 MMHG | WEIGHT: 175 LBS | BODY MASS INDEX: 32.01 KG/M2 | OXYGEN SATURATION: 98 % | TEMPERATURE: 97.6 F | HEART RATE: 67 BPM
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
## 2024-05-15 DIAGNOSIS — M54.6 ACUTE LEFT-SIDED THORACIC BACK PAIN: ICD-10-CM

## 2024-05-15 DIAGNOSIS — M54.50 ACUTE MIDLINE LOW BACK PAIN WITHOUT SCIATICA: Primary | ICD-10-CM

## 2024-05-15 DIAGNOSIS — M79.641 RIGHT HAND PAIN: ICD-10-CM

## 2024-05-15 PROCEDURE — 73130 X-RAY EXAM OF HAND: CPT

## 2024-05-15 PROCEDURE — 99214 OFFICE O/P EST MOD 30 MIN: CPT | Performed by: EMERGENCY MEDICINE

## 2024-05-15 PROCEDURE — 72100 X-RAY EXAM L-S SPINE 2/3 VWS: CPT

## 2024-05-15 PROCEDURE — 71101 X-RAY EXAM UNILAT RIBS/CHEST: CPT

## 2024-05-15 RX ORDER — NAPROXEN 500 MG/1
500 TABLET ORAL 2 TIMES DAILY WITH MEALS
Qty: 30 TABLET | Refills: 0 | Status: CANCELLED | OUTPATIENT
Start: 2024-05-15

## 2024-05-15 RX ORDER — ACETAMINOPHEN 500 MG
1000 TABLET ORAL EVERY 8 HOURS PRN
Qty: 30 TABLET | Refills: 0 | Status: CANCELLED | OUTPATIENT
Start: 2024-05-15

## 2024-05-15 RX ORDER — NAPROXEN 500 MG/1
500 TABLET ORAL 2 TIMES DAILY WITH MEALS
Qty: 14 TABLET | Refills: 0 | Status: SHIPPED | OUTPATIENT
Start: 2024-05-15 | End: 2024-05-15

## 2024-05-15 RX ORDER — METHOCARBAMOL 500 MG/1
500 TABLET, FILM COATED ORAL
Qty: 14 TABLET | Refills: 0 | Status: CANCELLED | OUTPATIENT
Start: 2024-05-15 | End: 2024-05-29

## 2024-05-15 RX ORDER — TIZANIDINE HYDROCHLORIDE 2 MG/1
2 CAPSULE, GELATIN COATED ORAL 3 TIMES DAILY PRN
Qty: 20 CAPSULE | Refills: 0 | Status: SHIPPED | OUTPATIENT
Start: 2024-05-15

## 2024-05-15 NOTE — LETTER
May 15, 2024     Patient: Echo Dalton   YOB: 1970   Date of Visit: 5/15/2024       To Whom it May Concern:    Echo Dalton was seen in my clinic on 5/15/2024. She may return to work on 5/20/2024 .    If you have any questions or concerns, please don't hesitate to call.         Sincerely,            Jose Bustos MD

## 2024-05-15 NOTE — PATIENT INSTRUCTIONS
Low Back Strain   AMBULATORY CARE:   Low back strain  is an injury to your lower back muscles or tendons. Tendons are strong tissues that connect muscles to bones. The lower back supports most of your body weight and helps you move, twist, and bend. Low back strain is usually caused by activities that increase stress on the lower back, such as exercise or injury.  Common signs and symptoms include the following:   Low back pain or muscle spasms    Stiffness or limited movement    Pain that goes down to the buttocks, groin, or legs    Pain that is worse with activity    Seek care immediately if:   You hear or feel a pop in your lower back.    You have increased swelling or pain in your lower back.    You have trouble moving your legs.    Your legs are numb.    Call your doctor if:   You have a fever.    Your pain does not go away, even after treatment.    You have questions or concerns about your condition or care.    Treatment for low back strain  may include any of the following:  Acetaminophen  decreases pain and fever. It is available without a doctor's order. Ask how much to take and how often to take it. Follow directions. Read the labels of all other medicines you are using to see if they also contain acetaminophen, or ask your doctor or pharmacist. Acetaminophen can cause liver damage if not taken correctly.    NSAIDs , such as ibuprofen, help decrease swelling, pain, and fever. This medicine is available with or without a doctor's order. NSAIDs can cause stomach bleeding or kidney problems in certain people. If you take blood thinner medicine, always ask your healthcare provider if NSAIDs are safe for you. Always read the medicine label and follow directions.    Muscle relaxers  help decrease pain and muscle spasms.    Prescription pain medicine  may be given. Ask your healthcare provider how to take this medicine safely. Some prescription pain medicines contain acetaminophen. Do not take other medicines  that contain acetaminophen without talking to your healthcare provider. Too much acetaminophen may cause liver damage. Prescription pain medicine may cause constipation. Ask your healthcare provider how to prevent or treat constipation.     Surgery  may be needed if your strain is severe.    Manage your symptoms:   Rest  as directed. You may need to rest in bed for a period of time after your injury. Do not lift heavy objects.    Apply ice  on your back for 15 to 20 minutes every hour or as directed. Use an ice pack, or put crushed ice in a plastic bag. Cover it with a towel. Ice helps prevent tissue damage and decreases swelling and pain.    Apply heat  on your lower back for 20 to 30 minutes every 2 hours for as many days as directed. Heat helps decrease pain and muscle spasms.    Slowly start to increase your activity  as the pain decreases, or as directed.    Prevent another low back strain:   Use correct body movements.      Bend at the hips and knees when you  objects. Do not bend from the waist. Use your leg muscles as you lift the load. Do not use your back. Keep the object close to your chest as you lift it. Try not to twist or lift anything above your waist.         Change your position often when you stand for long periods of time. Rest one foot on a small box or footrest, and then switch to the other foot often.    Try not to sit for long periods of time. When you do, sit in a straight-backed chair with your feet flat on the floor.    Never reach, pull, or push while you are sitting.    Warm up before you exercise.  Do exercises that strengthen your back muscles. Ask your healthcare provider about the best exercise plan for you.         Maintain a healthy weight.  Ask your healthcare provider how much you should weigh. Ask him to help you create a weight loss plan if you are overweight.    Follow up with your doctor as directed:  Write down your questions so you remember to ask them during your  visits.  © Copyright [employer] 2023 Information is for End User's use only and may not be sold, redistributed or otherwise used for commercial purposes.  The above information is an  only. It is not intended as medical advice for individual conditions or treatments. Talk to your doctor, nurse or pharmacist before following any medical regimen to see if it is safe and effective for you.

## 2024-05-15 NOTE — PROGRESS NOTES
Assessment/Plan:      Acute midline low back pain without sciatica  S/p fall   No red flag sx   Xray preliminary showed no fracture     PLAN:   - Pain control with Tylenol as patient had gastric bypass surgery   - Zanaflex TID PRN   - Await radiology xray read  - Rest and ice as needed  - Strict ED precautions if worsening pain    Acute left-sided thoracic back pain  S/p fall   No red flag sx   Xray preliminary showed no fracture     PLAN:   - Pain control with Tylenol as patient had gastric bypass surgery   - Zanaflex TID PRN   - Await radiology xray read  - Rest and ice as needed  - Strict ED precautions if worsening pain    Right hand pain  S/p fall   Xray preliminary showed no fracture     PLAN:   - Pain control with Tylenol as patient had gastric bypass surgery   - Await radiology xray read  - Rest and ice as needed  - Follow up with PCP/ consider hand surgery referral  - Strict ED precautions if worsening pain        Subjective:     Patient ID: Echo Dalton is a 54 y.o. female.    Echo Dalton is a 54 y.o. female with pmhx of htn presenting today back pain    Was walking down her stairs last night, had slides on that may have been too big for her and she slipped down four stairs and fell on the tiled floor. Tried to catch herself with her right hand but fell on left side.     Denies any head trauma or LOC.         Back Pain  This is a new problem. The current episode started today. The problem occurs constantly. The pain is present in the lumbar spine and thoracic spine (left sided flank pain). The pain is at a severity of 8/10. The symptoms are aggravated by standing and twisting (standing). Pertinent negatives include no abdominal pain, bladder incontinence, bowel incontinence, chest pain, dysuria, fever, headaches, numbness, paresis, paresthesias, pelvic pain, perianal numbness, tingling or weakness. She has tried nothing for the symptoms.       Review of Systems   Constitutional:  Negative for fever.    Cardiovascular:  Negative for chest pain.   Gastrointestinal:  Negative for abdominal pain and bowel incontinence.   Genitourinary:  Negative for bladder incontinence, dysuria and pelvic pain.   Musculoskeletal:  Positive for back pain.   Neurological:  Negative for tingling, weakness, numbness, headaches and paresthesias.         Objective:     Physical Exam  Vitals and nursing note reviewed.   Constitutional:       Appearance: Normal appearance.   Musculoskeletal:      Thoracic back: Spasms and tenderness present. Decreased range of motion.      Lumbar back: Tenderness and bony tenderness present. Decreased range of motion.        Back:       Right knee: Swelling present. No bony tenderness. Normal range of motion. Tenderness present over the lateral joint line (bruising noted on lateral joint line). No medial joint line tenderness.      Left knee: Normal.        Legs:    Neurological:      Mental Status: She is alert.      Cranial Nerves: No cranial nerve deficit or facial asymmetry.      Sensory: Sensation is intact.      Motor: No weakness.

## 2024-05-15 NOTE — ASSESSMENT & PLAN NOTE
S/p fall   No red flag sx   Xray preliminary showed no fracture     PLAN:   - Pain control with Tylenol as patient had gastric bypass surgery   - Zanaflex TID PRN   - Await radiology xray read  - Rest and ice as needed  - Strict ED precautions if worsening pain

## 2024-05-15 NOTE — ASSESSMENT & PLAN NOTE
S/p fall   Xray preliminary showed no fracture     PLAN:   - Pain control with Tylenol as patient had gastric bypass surgery   - Await radiology xray read  - Rest and ice as needed  - Follow up with PCP/ consider hand surgery referral  - Strict ED precautions if worsening pain

## 2024-05-22 ENCOUNTER — OFFICE VISIT (OUTPATIENT)
Dept: FAMILY MEDICINE CLINIC | Facility: CLINIC | Age: 54
End: 2024-05-22
Payer: COMMERCIAL

## 2024-05-22 ENCOUNTER — TELEPHONE (OUTPATIENT)
Dept: FAMILY MEDICINE CLINIC | Facility: CLINIC | Age: 54
End: 2024-05-22

## 2024-05-22 VITALS
OXYGEN SATURATION: 97 % | WEIGHT: 172.2 LBS | DIASTOLIC BLOOD PRESSURE: 70 MMHG | SYSTOLIC BLOOD PRESSURE: 102 MMHG | HEART RATE: 71 BPM | RESPIRATION RATE: 16 BRPM | HEIGHT: 62 IN | BODY MASS INDEX: 31.69 KG/M2 | TEMPERATURE: 97.3 F

## 2024-05-22 DIAGNOSIS — I10 ESSENTIAL HYPERTENSION: ICD-10-CM

## 2024-05-22 DIAGNOSIS — T14.8XXA BRUISE: ICD-10-CM

## 2024-05-22 DIAGNOSIS — M54.9 MID BACK PAIN ON LEFT SIDE: Primary | ICD-10-CM

## 2024-05-22 DIAGNOSIS — E66.09 CLASS 1 OBESITY DUE TO EXCESS CALORIES WITH BODY MASS INDEX (BMI) OF 31.0 TO 31.9 IN ADULT, UNSPECIFIED WHETHER SERIOUS COMORBIDITY PRESENT: ICD-10-CM

## 2024-05-22 PROCEDURE — 99214 OFFICE O/P EST MOD 30 MIN: CPT | Performed by: FAMILY MEDICINE

## 2024-05-22 RX ORDER — LOSARTAN POTASSIUM 100 MG/1
100 TABLET ORAL DAILY
Qty: 90 TABLET | Refills: 1 | Status: SHIPPED | OUTPATIENT
Start: 2024-05-22

## 2024-05-22 RX ORDER — AMLODIPINE BESYLATE 2.5 MG/1
2.5 TABLET ORAL DAILY
Qty: 90 TABLET | Refills: 1 | Status: SHIPPED | OUTPATIENT
Start: 2024-05-22

## 2024-05-22 RX ORDER — TRAMADOL HYDROCHLORIDE 50 MG/1
50 TABLET ORAL DAILY PRN
Qty: 7 TABLET | Refills: 0 | Status: SHIPPED | OUTPATIENT
Start: 2024-05-22

## 2024-05-22 NOTE — PATIENT INSTRUCTIONS
Refilled BP meds and rec rest left ribs and rec rest right leg and also cool compress prn right leg pain and rec tramadol prn severe pain with food ( no driving). Rec consult with [facility] for medical weight loss counseling, hx of gastric bypass and interested and weight loss meds).

## 2024-05-22 NOTE — PROGRESS NOTES
"Ambulatory Visit  Name: Echo Dalton      : 1970      MRN: [id number]  Encounter Provider: Paulie Osorio DO  Encounter Date: 2024   Encounter department: [facility] PRIMARY CARE  Chief Complaint   Patient presents with    Fall     Pt fell last week and pt has right leg pain and pain under her left ribs      Patient Instructions   Refilled BP meds and rec rest left ribs and rec rest right leg and also cool compress prn right leg pain and rec tramadol prn severe pain with food ( no driving). Rec consult with [facility] for medical weight loss counseling, hx of gastric bypass and interested and weight loss meds).     Assessment & Plan   1. Mid back pain on left side  2. Bruise  3. Essential hypertension  4. Class 1 obesity due to excess calories with body mass index (BMI) of 31.0 to 31.9 in adult, unspecified whether serious comorbidity present    [unfilled]  History of Present Illness     Here for left rib and back pain and also right leg bruise. Xrays stable. Patient would like to see medical weight loss specialist and gaining weight. Saw Dr. Lilly in past. Fall (Pt fell last week and pt has right leg pain and pain under her left ribs )    Fall        Review of Systems   Constitutional: Negative.    HENT: Negative.     Eyes: Negative.    Respiratory: Negative.     Cardiovascular: Negative.    Gastrointestinal: Negative.    Endocrine: Negative.    Genitourinary: Negative.    Musculoskeletal:         Fall (Pt fell last week and pt has right leg pain and pain under her left ribs ), overall better   Skin: Negative.    Allergic/Immunologic: Negative.    Neurological: Negative.    Hematological: Negative.    Psychiatric/Behavioral: Negative.         Objective     /70   Pulse 71   Temp (!) 97.3 °F (36.3 °C) (Temporal)   Resp 16   Ht 5' 2\" (1.575 m)   Wt 78.1 kg (172 lb 3.2 oz)   SpO2 97%   BMI 31.50 kg/m²     Physical Exam  Constitutional:       Appearance: She is " well-developed.   HENT:      Head: Normocephalic and atraumatic.      Right Ear: External ear normal.      Left Ear: External ear normal.      Nose: Nose normal.      Mouth/Throat:      Mouth: Mucous membranes are moist.   Eyes:      Conjunctiva/sclera: Conjunctivae normal.      Pupils: Pupils are equal, round, and reactive to light.   Cardiovascular:      Rate and Rhythm: Normal rate and regular rhythm.      Pulses: Normal pulses.      Heart sounds: Normal heart sounds.   Pulmonary:      Effort: Pulmonary effort is normal.      Breath sounds: Normal breath sounds.   Musculoskeletal:      Cervical back: Normal range of motion and neck supple.      Comments: Left mid back pain and right leg pain and bruise   Skin:     General: Skin is warm and dry.      Capillary Refill: Capillary refill takes less than 2 seconds.   Neurological:      General: No focal deficit present.      Mental Status: She is alert and oriented to person, place, and time. Mental status is at baseline.      Deep Tendon Reflexes: Reflexes are normal and symmetric.   Psychiatric:         Mood and Affect: Mood normal.         Behavior: Behavior normal.         Thought Content: Thought content normal.         Judgment: Judgment normal.       Administrative Statements   I have spent a total time of 30 minutes on 05/22/24 In caring for this patient including Diagnostic results, Prognosis, Risks and benefits of tx options, Instructions for management, Patient and family education, Importance of tx compliance, Risk factor reductions, Impressions, Counseling / Coordination of care, Documenting in the medical record, Reviewing / ordering tests, medicine, procedures  , and Obtaining or reviewing history  .

## 2024-05-22 NOTE — TELEPHONE ENCOUNTER
Patient was in for a visit and wanted to have her labs done after but there is nothing ordered for her. She would like to know if you can order her routine labs so she may complete.  Please advise, thank you

## 2024-05-23 DIAGNOSIS — E53.8 VITAMIN B12 DEFICIENCY: ICD-10-CM

## 2024-05-23 DIAGNOSIS — I10 PRIMARY HYPERTENSION: Primary | ICD-10-CM

## 2024-05-23 DIAGNOSIS — E55.9 VITAMIN D DEFICIENCY: ICD-10-CM

## 2024-05-23 DIAGNOSIS — Z98.84 BARIATRIC SURGERY STATUS: ICD-10-CM

## 2024-05-23 DIAGNOSIS — E66.09 CLASS 1 OBESITY DUE TO EXCESS CALORIES WITH BODY MASS INDEX (BMI) OF 31.0 TO 31.9 IN ADULT, UNSPECIFIED WHETHER SERIOUS COMORBIDITY PRESENT: ICD-10-CM

## 2024-05-24 ENCOUNTER — OFFICE VISIT (OUTPATIENT)
Dept: BARIATRICS | Facility: CLINIC | Age: 54
End: 2024-05-24
Payer: COMMERCIAL

## 2024-05-24 VITALS
HEART RATE: 82 BPM | HEIGHT: 62 IN | SYSTOLIC BLOOD PRESSURE: 108 MMHG | TEMPERATURE: 97.9 F | BODY MASS INDEX: 31.28 KG/M2 | WEIGHT: 170 LBS | DIASTOLIC BLOOD PRESSURE: 70 MMHG

## 2024-05-24 DIAGNOSIS — Z98.84 BARIATRIC SURGERY STATUS: Primary | ICD-10-CM

## 2024-05-24 DIAGNOSIS — R10.13 EPIGASTRIC PAIN: ICD-10-CM

## 2024-05-24 PROCEDURE — 99213 OFFICE O/P EST LOW 20 MIN: CPT | Performed by: SURGERY

## 2024-05-24 NOTE — H&P (VIEW-ONLY)
OFFICE VISIT - BARIATRIC SURGERY  Echo Dalton 54 y.o. female MRN: [id number]  Unit/Bed#:  Encounter: [id number]      HPI:  Echo Dalton is a 54 y.o. female status post Darion-En-Y Gastric Bypass with Dr. Juan Francisco Abdi in 2011, presents to the office today for follow up with complaints of weight regain and chronic abdominal pain.      Subjective       Patient is here with complaints of weight regain. She still notes abdominal pain and has history of ulcers. She is a heavy drinker but notes she is working on it.  She has quit smoking. She takes omeprazole and carafate while drinking. She denies NSAID usage.    At the time of their surgery the patient was 206lb, and was able to drop down as low as 135lb. Today, their weight is 170lb, with a BMI of 31.6.  The EWL is 60%.     Tolerating diet: Yes  Main symptoms: Abdominal pain  Worse with food: Yes  Nausea: Denies  Vomiting: Denies  Diarrhea: Denies  Smoking: Quit 1.5 years ago  Alcohol use: States she drinks 3-4 shots per day, takes carafate while drinking  NSAID use: Denies  Caffeine use: 1-2 times weekly  Current treatment: Heartburn, takes omeprazole PRN  Previous EGD: 2021  Previous Upper GI: No   Previous Manometry: No  Ambulation is not impaired.     Review of Systems   Constitutional:  Negative for chills and fever.   HENT:  Negative for ear pain and sore throat.    Eyes:  Negative for pain and visual disturbance.   Respiratory:  Negative for cough and shortness of breath.    Cardiovascular:  Negative for chest pain and palpitations.   Gastrointestinal:  Positive for abdominal pain. Negative for vomiting.        Weight regain   Genitourinary:  Negative for dysuria and hematuria.   Musculoskeletal:  Negative for arthralgias and back pain.   Skin:  Negative for color change and rash.   Neurological:  Negative for seizures and syncope.   All other systems reviewed and are negative.      Historical Information   Past Medical History:   Diagnosis Date    Abdominal mass,  "RLQ (right lower quadrant)     last assessed 6/25/2015    Allergic rhinitis     last assessed 8/14/2012    Carpal tunnel syndrome     Chronic left shoulder pain     Chronic pain disorder     left shoulder    Epigastric pain     Fracture of ankle     last assessed 10/21/2013    Frequent headaches     Gall stones     GERD (gastroesophageal reflux disease)     Hiatal hernia     last assessed 8/14/2012    Left supraspinatus tendinitis     Obesity surgery status     Onychomycosis     Osteopenia     Plantar fasciitis     Postgastrectomy malabsorption     RUQ pain     Sacroiliitis (HCC)      Past Surgical History:   Procedure Laterality Date    ABDOMINAL SURGERY      CHOLECYSTECTOMY      GALLBLADDER SURGERY      GASTRIC BYPASS      HERNIA REPAIR      [place] EGD TRANSORAL BIOPSY SINGLE/MULTIPLE N/A 4/4/2018    Procedure: ESOPHAGOGASTRODUODENOSCOPY (EGD);  Surgeon: James Abdi MD;  Location: [facility];  Service: Bariatrics    ROTATOR CUFF REPAIR      SHOULDER SURGERY       Social History   Social History     Substance and Sexual Activity   Alcohol Use Not Currently    Comment: 3-4 shots 5 times per week     Social History     Substance and Sexual Activity   Drug Use No     Social History     Tobacco Use   Smoking Status Former    Types: Cigars   Smokeless Tobacco Never       Objective       Current Vitals:   Blood Pressure: 108/70 (05/24/24 1408)  Pulse: 82 (05/24/24 1408)  Temperature: 97.9 °F (36.6 °C) (05/24/24 1408)  Temp Source: Tympanic (05/24/24 1408)  Height: 5' 1.5\" (156.2 cm) (05/24/24 1408)  Weight - Scale: 77.1 kg (170 lb) (05/24/24 1408)    Invasive Devices       None                   Physical Exam  Constitutional:       Appearance: Normal appearance. She is obese.   HENT:      Head: Normocephalic and atraumatic.      Nose: Nose normal.      Mouth/Throat:      Mouth: Mucous membranes are moist.   Eyes:      Extraocular Movements: Extraocular movements intact.      Pupils: Pupils are equal, round, and reactive " to light.   Cardiovascular:      Rate and Rhythm: Normal rate and regular rhythm.      Pulses: Normal pulses.      Heart sounds: Normal heart sounds.   Pulmonary:      Effort: Pulmonary effort is normal.      Breath sounds: Normal breath sounds.   Abdominal:      Palpations: Abdomen is soft.   Musculoskeletal:      Cervical back: Normal range of motion.   Skin:     General: Skin is warm.   Neurological:      General: No focal deficit present.      Mental Status: She is alert and oriented to person, place, and time.   Psychiatric:         Mood and Affect: Mood normal.         Behavior: Behavior normal.           Pathology, and Other Studies: I have personally reviewed pertinent reports.        Assessment/PLAN:    Echo Dalton is a 54 y.o. female status post  status post Darion-En-Y Gastric Bypass with Dr. Juan Francisco Abdi in 2011, presents to the office today for follow up with complaints of weight regain and chronic abdominal pain.    Workup thus far reviewed and discussed with patient Workup includes:       EGD  Replaced by [facility]  [address] [facility] [address]  [phone]        DATE OF SERVICE:  9/22/21     PHYSICIAN(S):  MD Rand Schmitz DO - Attending Physician        INDICATION:  Bariatric surgery status     POST-OP DIAGNOSIS:  See the impression below.     PREPROCEDURE:  Informed consent was obtained for the procedure, including sedation.  Risks of perforation, hemorrhage, adverse drug reaction and aspiration were discussed. The patient was placed in the left lateral decubitus position.     Patient was explained about the risks and benefits of the procedure. Risks including but not limited to bleeding, infection, and perforation were explained in detail. Also explained about less than 100% sensitivity with the exam and other alternatives.     DETAILS OF PROCEDURE:  Patient was taken to the procedure room where a time out was performed to confirm correct patient and  correct procedure. The patient underwent monitored anesthesia care, which was administered by an anesthesia professional. The patient's blood pressure, heart rate, level of consciousness, respirations and oxygen were monitored throughout the procedure. The scope was advanced to the proximal part of the jejunum. Prior to the procedure, the patient's H. Pylori status was unknown. The patient experienced no blood loss. The procedure was not difficult. The patient tolerated the procedure well. There were no apparent complications. 51 y.o. female status postlaparoscopic RNYGB performed by Dr. Juan Francisco Abdi in 2011 returning to office after recent visit to the ER for abdominal pain and vomiting.  She admits to smoking.  Here for an EGD to evaluate the anatomy of the GI tract.      ANESTHESIA INFORMATION:  ASA: II  Anesthesia Type: General, IV Sedation with Anesthesia     MEDICATIONS:  No administrations occurring from 1219 to 1226 on 09/22/21         FINDINGS:  Mild, generalized erythematous mucosa in the stomach; performed 2 cold forceps biopsies to rule out H. pylori  Multiple large, superficial, irregular, benign-appearing ulcers in the proximal jejunum with clean base (Grzegorz III)        SPECIMENS:  ID Type Source Tests Collected by Time Destination   1 : gastric biopsies, rule out h pylori, retrieved with cold biopsy forcep Tissue Stomach TISSUE EXAM King Lilly MD 9/22/2021 1224              IMPRESSION:     Pouchitis  Two fairly large marginal ulcers  No evidence of anastomotic stricture or gastrogastric fistula.     RECOMMENDATION:  Continue with the bariatric program process and follow up in the office.  Biopsy results pending.        MD Rand Schmitz, DO  --------------------------------------------------------------------    She has large ulcers on last EGD and is still having ongoing abdominal pain with risk factors for ulcers. We will hold on medical weight management until we repeat EGD to  rule out ulcers first.     Educated on risk of ulcer perforation    She needs to remain smoke free and quit drinking before any surgical procedure    Schedule EGD and follow up after              Erica Singh PA-C  Bariatric Surgery  5/24/2024  2:49 PM

## 2024-05-24 NOTE — PROGRESS NOTES
OFFICE VISIT - BARIATRIC SURGERY  Echo Dalton 54 y.o. female MRN: [id number]  Unit/Bed#:  Encounter: [id number]      HPI:  Echo Dalton is a 54 y.o. female status post Darion-En-Y Gastric Bypass with Dr. Juan Francisco Abdi in 2011, presents to the office today for follow up with complaints of weight regain and chronic abdominal pain.      Subjective       Patient is here with complaints of weight regain. She still notes abdominal pain and has history of ulcers. She is a heavy drinker but notes she is working on it.  She has quit smoking. She takes omeprazole and carafate while drinking. She denies NSAID usage.    At the time of their surgery the patient was 206lb, and was able to drop down as low as 135lb. Today, their weight is 170lb, with a BMI of 31.6.  The EWL is 60%.     Tolerating diet: Yes  Main symptoms: Abdominal pain  Worse with food: Yes  Nausea: Denies  Vomiting: Denies  Diarrhea: Denies  Smoking: Quit 1.5 years ago  Alcohol use: States she drinks 3-4 shots per day, takes carafate while drinking  NSAID use: Denies  Caffeine use: 1-2 times weekly  Current treatment: Heartburn, takes omeprazole PRN  Previous EGD: 2021  Previous Upper GI: No   Previous Manometry: No  Ambulation is not impaired.     Review of Systems   Constitutional:  Negative for chills and fever.   HENT:  Negative for ear pain and sore throat.    Eyes:  Negative for pain and visual disturbance.   Respiratory:  Negative for cough and shortness of breath.    Cardiovascular:  Negative for chest pain and palpitations.   Gastrointestinal:  Positive for abdominal pain. Negative for vomiting.        Weight regain   Genitourinary:  Negative for dysuria and hematuria.   Musculoskeletal:  Negative for arthralgias and back pain.   Skin:  Negative for color change and rash.   Neurological:  Negative for seizures and syncope.   All other systems reviewed and are negative.      Historical Information   Past Medical History:   Diagnosis Date    Abdominal mass,  "RLQ (right lower quadrant)     last assessed 6/25/2015    Allergic rhinitis     last assessed 8/14/2012    Carpal tunnel syndrome     Chronic left shoulder pain     Chronic pain disorder     left shoulder    Epigastric pain     Fracture of ankle     last assessed 10/21/2013    Frequent headaches     Gall stones     GERD (gastroesophageal reflux disease)     Hiatal hernia     last assessed 8/14/2012    Left supraspinatus tendinitis     Obesity surgery status     Onychomycosis     Osteopenia     Plantar fasciitis     Postgastrectomy malabsorption     RUQ pain     Sacroiliitis (HCC)      Past Surgical History:   Procedure Laterality Date    ABDOMINAL SURGERY      CHOLECYSTECTOMY      GALLBLADDER SURGERY      GASTRIC BYPASS      HERNIA REPAIR      [place] EGD TRANSORAL BIOPSY SINGLE/MULTIPLE N/A 4/4/2018    Procedure: ESOPHAGOGASTRODUODENOSCOPY (EGD);  Surgeon: James Abdi MD;  Location: [facility];  Service: Bariatrics    ROTATOR CUFF REPAIR      SHOULDER SURGERY       Social History   Social History     Substance and Sexual Activity   Alcohol Use Not Currently    Comment: 3-4 shots 5 times per week     Social History     Substance and Sexual Activity   Drug Use No     Social History     Tobacco Use   Smoking Status Former    Types: Cigars   Smokeless Tobacco Never       Objective       Current Vitals:   Blood Pressure: 108/70 (05/24/24 1408)  Pulse: 82 (05/24/24 1408)  Temperature: 97.9 °F (36.6 °C) (05/24/24 1408)  Temp Source: Tympanic (05/24/24 1408)  Height: 5' 1.5\" (156.2 cm) (05/24/24 1408)  Weight - Scale: 77.1 kg (170 lb) (05/24/24 1408)    Invasive Devices       None                   Physical Exam  Constitutional:       Appearance: Normal appearance. She is obese.   HENT:      Head: Normocephalic and atraumatic.      Nose: Nose normal.      Mouth/Throat:      Mouth: Mucous membranes are moist.   Eyes:      Extraocular Movements: Extraocular movements intact.      Pupils: Pupils are equal, round, and reactive " to light.   Cardiovascular:      Rate and Rhythm: Normal rate and regular rhythm.      Pulses: Normal pulses.      Heart sounds: Normal heart sounds.   Pulmonary:      Effort: Pulmonary effort is normal.      Breath sounds: Normal breath sounds.   Abdominal:      Palpations: Abdomen is soft.   Musculoskeletal:      Cervical back: Normal range of motion.   Skin:     General: Skin is warm.   Neurological:      General: No focal deficit present.      Mental Status: She is alert and oriented to person, place, and time.   Psychiatric:         Mood and Affect: Mood normal.         Behavior: Behavior normal.           Pathology, and Other Studies: I have personally reviewed pertinent reports.        Assessment/PLAN:    Echo Dalton is a 54 y.o. female status post  status post Darion-En-Y Gastric Bypass with Dr. Juan Francisco Abdi in 2011, presents to the office today for follow up with complaints of weight regain and chronic abdominal pain.    Workup thus far reviewed and discussed with patient Workup includes:       EGD  [facility]  [address] [facility] [address]  [phone]        DATE OF SERVICE:  9/22/21     PHYSICIAN(S):  MD Rand Schmitz DO - Attending Physician        INDICATION:  Bariatric surgery status     POST-OP DIAGNOSIS:  See the impression below.     PREPROCEDURE:  Informed consent was obtained for the procedure, including sedation.  Risks of perforation, hemorrhage, adverse drug reaction and aspiration were discussed. The patient was placed in the left lateral decubitus position.     Patient was explained about the risks and benefits of the procedure. Risks including but not limited to bleeding, infection, and perforation were explained in detail. Also explained about less than 100% sensitivity with the exam and other alternatives.     DETAILS OF PROCEDURE:  Patient was taken to the procedure room where a time out was performed to confirm correct patient and  correct procedure. The patient underwent monitored anesthesia care, which was administered by an anesthesia professional. The patient's blood pressure, heart rate, level of consciousness, respirations and oxygen were monitored throughout the procedure. The scope was advanced to the proximal part of the jejunum. Prior to the procedure, the patient's H. Pylori status was unknown. The patient experienced no blood loss. The procedure was not difficult. The patient tolerated the procedure well. There were no apparent complications. 51 y.o. female status postlaparoscopic RNYGB performed by Dr. Juan Francisco Abdi in 2011 returning to office after recent visit to the ER for abdominal pain and vomiting.  She admits to smoking.  Here for an EGD to evaluate the anatomy of the GI tract.      ANESTHESIA INFORMATION:  ASA: II  Anesthesia Type: General, IV Sedation with Anesthesia     MEDICATIONS:  No administrations occurring from 1219 to 1226 on 09/22/21         FINDINGS:  Mild, generalized erythematous mucosa in the stomach; performed 2 cold forceps biopsies to rule out H. pylori  Multiple large, superficial, irregular, benign-appearing ulcers in the proximal jejunum with clean base (Grzegorz III)        SPECIMENS:  ID Type Source Tests Collected by Time Destination   1 : gastric biopsies, rule out h pylori, retrieved with cold biopsy forcep Tissue Stomach TISSUE EXAM King Lilly MD 9/22/2021 1224              IMPRESSION:     Pouchitis  Two fairly large marginal ulcers  No evidence of anastomotic stricture or gastrogastric fistula.     RECOMMENDATION:  Continue with the bariatric program process and follow up in the office.  Biopsy results pending.        MD Rand Schmitz, DO  --------------------------------------------------------------------    She has large ulcers on last EGD and is still having ongoing abdominal pain with risk factors for ulcers. We will hold on medical weight management until we repeat EGD to  rule out ulcers first.     Educated on risk of ulcer perforation    She needs to remain smoke free and quit drinking before any surgical procedure    Schedule EGD and follow up after              Erica Singh PA-C  Bariatric Surgery  5/24/2024  2:49 PM

## 2024-05-28 ENCOUNTER — PREP FOR PROCEDURE (OUTPATIENT)
Dept: BARIATRICS | Facility: CLINIC | Age: 54
End: 2024-05-28

## 2024-05-28 DIAGNOSIS — Z98.84 BARIATRIC SURGERY STATUS: Primary | ICD-10-CM

## 2024-05-30 ENCOUNTER — TELEPHONE (OUTPATIENT)
Dept: BARIATRICS | Facility: CLINIC | Age: 54
End: 2024-05-30

## 2024-05-30 ENCOUNTER — TELEPHONE (OUTPATIENT)
Age: 54
End: 2024-05-30

## 2024-05-30 NOTE — TELEPHONE ENCOUNTER
Patient returned call, spoke to pt ensured they knew and confirmed EGD was scheduled for next week (6/5).

## 2024-06-03 NOTE — PATIENT INSTRUCTIONS
RUQ abdominal pain off and on for 1 year, worse over the last 3 days with vomiting  Hx of GERd, start omeprazole 20 mg once daily prn gerd/gastritis  Check labs and check US for RUQ abdominal pain and consult General Surgery Dr Taylor [address]  for abdominal pain  Decrease and quit ETOH use  Addendum: pt  States omeprazole not covered but protonix covered, prescribed protonix 20 mg 1 po qd prn gerd 
194.8

## 2024-06-05 ENCOUNTER — HOSPITAL ENCOUNTER (OUTPATIENT)
Dept: GASTROENTEROLOGY | Facility: HOSPITAL | Age: 54
Setting detail: OUTPATIENT SURGERY
Discharge: HOME | End: 2024-06-05
Attending: SURGERY | Admitting: SURGERY
Payer: COMMERCIAL

## 2024-06-05 ENCOUNTER — ANESTHESIA (OUTPATIENT)
Dept: GASTROENTEROLOGY | Facility: HOSPITAL | Age: 54
End: 2024-06-05

## 2024-06-05 ENCOUNTER — ANESTHESIA EVENT (OUTPATIENT)
Dept: GASTROENTEROLOGY | Facility: HOSPITAL | Age: 54
End: 2024-06-05

## 2024-06-05 VITALS
TEMPERATURE: 98.4 F | RESPIRATION RATE: 18 BRPM | DIASTOLIC BLOOD PRESSURE: 63 MMHG | OXYGEN SATURATION: 100 % | HEART RATE: 61 BPM | WEIGHT: 170 LBS | BODY MASS INDEX: 31.28 KG/M2 | HEIGHT: 62 IN | SYSTOLIC BLOOD PRESSURE: 111 MMHG

## 2024-06-05 DIAGNOSIS — Z98.84 BARIATRIC SURGERY STATUS: ICD-10-CM

## 2024-06-05 PROCEDURE — 43239 EGD BIOPSY SINGLE/MULTIPLE: CPT | Performed by: SURGERY

## 2024-06-05 PROCEDURE — 88305 TISSUE EXAM BY PATHOLOGIST: CPT | Performed by: PATHOLOGY

## 2024-06-05 RX ORDER — MISOPROSTOL 200 UG/1
100 TABLET ORAL 4 TIMES DAILY
Qty: 120 TABLET | Refills: 2 | Status: SHIPPED | OUTPATIENT
Start: 2024-06-05

## 2024-06-05 RX ORDER — LIDOCAINE HYDROCHLORIDE 20 MG/ML
INJECTION, SOLUTION EPIDURAL; INFILTRATION; INTRACAUDAL; PERINEURAL AS NEEDED
Status: DISCONTINUED | OUTPATIENT
Start: 2024-06-05 | End: 2024-06-05

## 2024-06-05 RX ORDER — SODIUM CHLORIDE 9 MG/ML
125 INJECTION, SOLUTION INTRAVENOUS CONTINUOUS
Status: DISCONTINUED | OUTPATIENT
Start: 2024-06-05 | End: 2024-06-09 | Stop reason: HOSPADM

## 2024-06-05 RX ORDER — PROPOFOL 10 MG/ML
INJECTION, EMULSION INTRAVENOUS AS NEEDED
Status: DISCONTINUED | OUTPATIENT
Start: 2024-06-05 | End: 2024-06-05

## 2024-06-05 RX ADMIN — LIDOCAINE HYDROCHLORIDE 50 MG: 20 INJECTION, SOLUTION EPIDURAL; INFILTRATION; INTRACAUDAL at 14:16

## 2024-06-05 RX ADMIN — PROPOFOL 200 MG: 10 INJECTION, EMULSION INTRAVENOUS at 14:16

## 2024-06-05 RX ADMIN — SODIUM CHLORIDE 125 ML/HR: 0.9 INJECTION, SOLUTION INTRAVENOUS at 13:45

## 2024-06-05 NOTE — INTERVAL H&P NOTE
H&P reviewed. After examining the patient I find no changes in the patients condition since the H&P had been written.    Vitals:    06/05/24 1334   BP: 124/64   Pulse: 64   Resp: 16   Temp: 98 °F (36.7 °C)   SpO2: 100%

## 2024-06-05 NOTE — ANESTHESIA PREPROCEDURE EVALUATION
Procedure:  EGD    Relevant Problems   ANESTHESIA (within normal limits)      CARDIO   (+) Primary hypertension      GI/HEPATIC   (+) GERD (gastroesophageal reflux disease)      Behavioral Health   (+) Alcohol use disorder      Surgery/Wound/Pain   (+) Bariatric surgery status   (+) Epigastric pain        Physical Exam    Airway    Mallampati score: II  TM Distance: >3 FB  Neck ROM: full     Dental   No notable dental hx     Cardiovascular  Rhythm: regular, Rate: normal, Cardiovascular exam normal    Pulmonary  Pulmonary exam normal Breath sounds clear to auscultation    Other Findings  post-pubertal.      Anesthesia Plan  ASA Score- 2     Anesthesia Type- IV sedation with anesthesia with ASA Monitors.         Additional Monitors:     Airway Plan:            Plan Factors-Exercise tolerance (METS): >4 METS.    Chart reviewed.    Patient summary reviewed.    Patient is not a current smoker.              Induction-     Postoperative Plan-         Informed Consent- Anesthetic plan and risks discussed with patient.  I personally reviewed this patient with the CRNA. Discussed and agreed on the Anesthesia Plan with the CRNA..

## 2024-06-05 NOTE — ANESTHESIA POSTPROCEDURE EVALUATION
Post-Op Assessment Note    CV Status:  Stable    Pain management: adequate       Mental Status:  Alert   Hydration Status:  Stable   PONV Controlled:  None   Airway Patency:  Patent     Post Op Vitals Reviewed: Yes    No anethesia notable event occurred.    Staff: Anesthesiologist               /63 (06/05/24 1439)    Temp      Pulse 61 (06/05/24 1439)   Resp 18 (06/05/24 1439)    SpO2 100 % (06/05/24 1439)

## 2024-06-07 ENCOUNTER — TELEPHONE (OUTPATIENT)
Dept: BARIATRICS | Facility: CLINIC | Age: 54
End: 2024-06-07

## 2024-06-07 NOTE — TELEPHONE ENCOUNTER
LVM to schedule a repeat EGD in 8 weeks with Dr Juan Francisco Abdi and then have a review. Left office info to call back [phone]

## 2024-06-10 PROCEDURE — 88305 TISSUE EXAM BY PATHOLOGIST: CPT | Performed by: PATHOLOGY

## 2024-07-05 ENCOUNTER — LAB (OUTPATIENT)
Dept: LAB | Age: 54
End: 2024-07-05
Payer: COMMERCIAL

## 2024-07-05 DIAGNOSIS — I10 PRIMARY HYPERTENSION: ICD-10-CM

## 2024-07-05 DIAGNOSIS — E53.8 VITAMIN B12 DEFICIENCY: ICD-10-CM

## 2024-07-05 DIAGNOSIS — E55.9 VITAMIN D DEFICIENCY: ICD-10-CM

## 2024-07-05 DIAGNOSIS — Z11.3 ROUTINE SCREENING FOR STI (SEXUALLY TRANSMITTED INFECTION): ICD-10-CM

## 2024-07-05 DIAGNOSIS — Z98.84 BARIATRIC SURGERY STATUS: ICD-10-CM

## 2024-07-05 DIAGNOSIS — E66.09 CLASS 1 OBESITY DUE TO EXCESS CALORIES WITH BODY MASS INDEX (BMI) OF 31.0 TO 31.9 IN ADULT, UNSPECIFIED WHETHER SERIOUS COMORBIDITY PRESENT: ICD-10-CM

## 2024-07-05 LAB
25(OH)D3 SERPL-MCNC: 22.1 NG/ML (ref 30–100)
ALBUMIN SERPL BCG-MCNC: 3.8 G/DL (ref 3.5–5)
ALP SERPL-CCNC: 84 U/L (ref 34–104)
ALT SERPL W P-5'-P-CCNC: 11 U/L (ref 7–52)
ANION GAP SERPL CALCULATED.3IONS-SCNC: 9 MMOL/L (ref 4–13)
AST SERPL W P-5'-P-CCNC: 18 U/L (ref 13–39)
BILIRUB SERPL-MCNC: 0.64 MG/DL (ref 0.2–1)
BUN SERPL-MCNC: 16 MG/DL (ref 5–25)
CALCIUM SERPL-MCNC: 9.2 MG/DL (ref 8.4–10.2)
CHLORIDE SERPL-SCNC: 105 MMOL/L (ref 96–108)
CHOLEST SERPL-MCNC: 192 MG/DL
CO2 SERPL-SCNC: 26 MMOL/L (ref 21–32)
CREAT SERPL-MCNC: 0.83 MG/DL (ref 0.6–1.3)
EST. AVERAGE GLUCOSE BLD GHB EST-MCNC: 100 MG/DL
GFR SERPL CREATININE-BSD FRML MDRD: 81 ML/MIN/1.73SQ M
GLUCOSE P FAST SERPL-MCNC: 96 MG/DL (ref 65–99)
HBA1C MFR BLD: 5.1 %
HBV SURFACE AG SER QL: NORMAL
HDLC SERPL-MCNC: 103 MG/DL
HIV 1+2 AB+HIV1 P24 AG SERPL QL IA: NORMAL
HIV 2 AB SERPL QL IA: NORMAL
HIV1 AB SERPL QL IA: NORMAL
HIV1 P24 AG SERPL QL IA: NORMAL
LDLC SERPL CALC-MCNC: 74 MG/DL (ref 0–100)
POTASSIUM SERPL-SCNC: 4.2 MMOL/L (ref 3.5–5.3)
PROT SERPL-MCNC: 7 G/DL (ref 6.4–8.4)
SODIUM SERPL-SCNC: 140 MMOL/L (ref 135–147)
TREPONEMA PALLIDUM IGG+IGM AB [PRESENCE] IN SERUM OR PLASMA BY IMMUNOASSAY: NORMAL
TRIGL SERPL-MCNC: 76 MG/DL
TSH SERPL DL<=0.05 MIU/L-ACNC: 0.53 UIU/ML (ref 0.45–4.5)
VIT B12 SERPL-MCNC: 251 PG/ML (ref 180–914)

## 2024-07-05 PROCEDURE — 87389 HIV-1 AG W/HIV-1&-2 AB AG IA: CPT

## 2024-07-05 PROCEDURE — 80053 COMPREHEN METABOLIC PANEL: CPT

## 2024-07-05 PROCEDURE — 86695 HERPES SIMPLEX TYPE 1 TEST: CPT

## 2024-07-05 PROCEDURE — 82306 VITAMIN D 25 HYDROXY: CPT

## 2024-07-05 PROCEDURE — 86696 HERPES SIMPLEX TYPE 2 TEST: CPT

## 2024-07-05 PROCEDURE — 83036 HEMOGLOBIN GLYCOSYLATED A1C: CPT

## 2024-07-05 PROCEDURE — 84443 ASSAY THYROID STIM HORMONE: CPT

## 2024-07-05 PROCEDURE — 87340 HEPATITIS B SURFACE AG IA: CPT

## 2024-07-05 PROCEDURE — 86780 TREPONEMA PALLIDUM: CPT

## 2024-07-05 PROCEDURE — 36415 COLL VENOUS BLD VENIPUNCTURE: CPT

## 2024-07-05 PROCEDURE — 82607 VITAMIN B-12: CPT

## 2024-07-05 PROCEDURE — 80061 LIPID PANEL: CPT

## 2024-07-07 NOTE — RESULT ENCOUNTER NOTE
Persistently low vitamin D although better from last year.  If patient is doing vitamin D 2000 international units daily she should double that.  Also the B12 is low and she should be doing 1000 mcg of B12 daily.  All other labs are in range

## 2024-07-10 LAB — SCAN RESULT: NORMAL

## 2024-07-15 ENCOUNTER — OFFICE VISIT (OUTPATIENT)
Dept: FAMILY MEDICINE CLINIC | Facility: CLINIC | Age: 54
End: 2024-07-15
Payer: COMMERCIAL

## 2024-07-15 VITALS
HEART RATE: 55 BPM | BODY MASS INDEX: 31.39 KG/M2 | TEMPERATURE: 97.1 F | HEIGHT: 62 IN | WEIGHT: 170.6 LBS | OXYGEN SATURATION: 99 % | RESPIRATION RATE: 16 BRPM | SYSTOLIC BLOOD PRESSURE: 122 MMHG | DIASTOLIC BLOOD PRESSURE: 72 MMHG

## 2024-07-15 DIAGNOSIS — H10.31 ACUTE CONJUNCTIVITIS OF RIGHT EYE, UNSPECIFIED ACUTE CONJUNCTIVITIS TYPE: Primary | ICD-10-CM

## 2024-07-15 DIAGNOSIS — S69.91XS FINGER INJURY, RIGHT, SEQUELA: ICD-10-CM

## 2024-07-15 DIAGNOSIS — H01.00A BLEPHARITIS OF BOTH UPPER AND LOWER EYELID OF RIGHT EYE, UNSPECIFIED TYPE: ICD-10-CM

## 2024-07-15 PROCEDURE — 99214 OFFICE O/P EST MOD 30 MIN: CPT | Performed by: FAMILY MEDICINE

## 2024-07-15 RX ORDER — TOBRAMYCIN 3 MG/ML
1 SOLUTION/ DROPS OPHTHALMIC
Qty: 5 ML | Refills: 0 | Status: SHIPPED | OUTPATIENT
Start: 2024-07-15

## 2024-07-15 NOTE — PROGRESS NOTES
Ambulatory Visit  Name: Echo Dalton      : 1970      MRN: [id number]  Encounter Provider: Vanda Bueno DO  Encounter Date: 7/15/2024   Encounter department: [facility] PRIMARY CARE    Assessment & Plan   1. Acute conjunctivitis of right eye, unspecified acute conjunctivitis type  -     tobramycin (TOBREX) 0.3 % SOLN; Administer 1 drop to the right eye every 4 (four) hours while awake  2. Blepharitis of both upper and lower eyelid of right eye, unspecified type  -     tobramycin (TOBREX) 0.3 % SOLN; Administer 1 drop to the right eye every 4 (four) hours while awake  3. Finger injury, right, sequela  Assessment & Plan:  Due to patient's occupation and need to safely use a firearm, would recommend orthopedic opinion with regards to treatment strategies for injury to right fifth digit.  Orders:  -     Ambulatory Referral to Orthopedic Surgery; Future       I have spent a total time of 25 minutes in caring for this patient on the day of the visit/encounter including Diagnostic results, Risks and benefits of tx options, Instructions for management, Impressions, Counseling / Coordination of care, Documenting in the medical record, Reviewing / ordering tests, medicine, procedures  , and Obtaining or reviewing history  .   History of Present Illness   Chief Complaint   Patient presents with   • Hand Pain     Pt is here for left pinky pain and pt believes she has pink eye       Hand Pain   Incident onset: in MAY. The injury mechanism was a fall. The pain is present in the right hand (pinky finger). The quality of the pain is described as aching (Decreased range of motion as well as pain with trying to flex and extend). Pain scale: Fluctuates. The pain is moderate. She has tried acetaminophen, elevation, ice, heat and immobilization for the symptoms. The treatment provided mild relief.   Eye Problem   The right eye is affected. This is a new problem. The current episode started in the past 7 days.  "The problem has been gradually worsening. There was no injury mechanism. The pain is moderate. Associated symptoms include an eye discharge, eye redness and itching. She has tried commercial eye wash for the symptoms.   Initially went to urgent care on May 15 x-ray of hand as well as ribs and lumbar spine done secondary to mechanism of fall.  Otherwise other areas have no residual discomfort.  Right hand x-ray:  For the purposes of institution wide universal language the following terms will apply: (thumb=1st digit/finger, index finger=2nd digit/finger, long finger=3rd digit/finger, ring=4th digit/finger and small finger=5th digit/finger)     FINDINGS:  No acute fracture or dislocation  No significant degenerative changes.  No lytic or blastic osseous lesion.    Patient then had follow-up in our office with Dr. Osorio on May 22 who advised watchful waiting.    In the interim unrelated, patient has been up-to-date with weight management including EGD June 5.  Review of Systems   Constitutional:         As noted in HPI   Eyes:  Positive for discharge, redness and itching.   Musculoskeletal:         Right pinky finger injury May-fall on steps, xray \"negative.  Some difficulty with handling her firearm   All other systems reviewed and are negative.    Past Medical History:   Diagnosis Date   • Abdominal mass, RLQ (right lower quadrant)     last assessed 6/25/2015   • Allergic rhinitis     last assessed 8/14/2012   • Carpal tunnel syndrome    • Chronic left shoulder pain    • Chronic pain disorder     left shoulder   • Epigastric pain    • Fracture of ankle     last assessed 10/21/2013   • Frequent headaches    • Gall stones    • GERD (gastroesophageal reflux disease)    • Hiatal hernia     last assessed 8/14/2012   • Hypertension    • Left supraspinatus tendinitis    • Obesity surgery status    • Onychomycosis    • Osteopenia    • Plantar fasciitis    • Postgastrectomy malabsorption    • RUQ pain    • Sacroiliitis " (HCC)      Past Surgical History:   Procedure Laterality Date   • ABDOMINAL SURGERY     • CHOLECYSTECTOMY     • GALLBLADDER SURGERY     • GASTRIC BYPASS     • HERNIA REPAIR     • [place] EGD TRANSORAL BIOPSY SINGLE/MULTIPLE N/A 4/4/2018    Procedure: ESOPHAGOGASTRODUODENOSCOPY (EGD);  Surgeon: James Abdi MD;  Location: [facility];  Service: Bariatrics   • ROTATOR CUFF REPAIR     • SHOULDER SURGERY       Family History   Problem Relation Age of Onset   • Stroke Father         syndrome   • Osteoporosis Family      Social History     Tobacco Use   • Smoking status: Former     Types: Cigars   • Smokeless tobacco: Never   Vaping Use   • Vaping status: Never Used   Substance and Sexual Activity   • Alcohol use: Not Currently     Comment: 3-4 shots 5 times per week   • Drug use: No   • Sexual activity: Yes     Partners: Female     Comment: without practicing 'safer sex'     Current Outpatient Medications on File Prior to Visit   Medication Sig   • al mag oxide-diphenhydramine-lidocaine viscous (MAGIC MOUTHWASH) 1:1:1 suspension Swish and swallow 10 mL every 4 (four) hours as needed for mouth pain or discomfort   • amLODIPine (NORVASC) 2.5 mg tablet Take 1 tablet (2.5 mg total) by mouth daily   • Calcium Citrate 1040 MG TABS Take by mouth    • Cholecalciferol 50 MCG (2000 UT) CAPS Take 4 capsules by mouth   • clotrimazole-betamethasone (LOTRISONE) 1-0.05 % cream Apply topically 2 (two) times a day   • Cyanocobalamin (VITAMIN B-12) 1000 MCG SUBL Place under the tongue   • ergocalciferol (VITAMIN D2) 50,000 units Take 1 capsule by mouth once a week   • HYDROcodone-acetaminophen (NORCO) 5-325 mg per tablet Take 1 tablet by mouth every 6 (six) hours as needed for pain Max Daily Amount: 4 tablets   • losartan (COZAAR) 100 MG tablet Take 1 tablet (100 mg total) by mouth daily   • miSOPROStol (Cytotec) 200 mcg tablet Take 0.5 tablets (100 mcg total) by mouth 4 (four) times a day   • Multiple Vitamins-Minerals  "(HAIR/SKIN/NAILS/BIOTIN) TABS Take by mouth   • omeprazole (PriLOSEC) 40 MG capsule Take 1 capsule (40 mg total) by mouth daily   • TiZANidine (ZANAFLEX) 2 MG capsule Take 1 capsule (2 mg total) by mouth 3 (three) times a day as needed for muscle spasms   • traMADol (Ultram) 50 mg tablet Take 1 tablet (50 mg total) by mouth daily as needed for severe pain No driving   • vitamin E, tocopherol, 400 units capsule Take 400 Units by mouth   • EPINEPHrine (EpiPen 2-Nura) 0.3 mg/0.3 mL SOAJ Inject 0.3 mL (0.3 mg total) into a muscle once for 1 dose   • liraglutide (SAXENDA) injection Inject 0.1 mL (0.6 mg total) under the skin daily Titrate per schedule (Patient not taking: Reported on 9/8/2023)   • liraglutide (SAXENDA) injection Inject 0.1 mL (0.6 mg total) under the skin daily (Patient not taking: Reported on 5/15/2024)   • nystatin (MYCOSTATIN) powder Apply topically 3 (three) times a day (Patient not taking: Reported on 5/24/2024)   • sucralfate (CARAFATE) 1 g/10 mL suspension Take 10 mL (1 g total) by mouth 4 (four) times a day (Patient not taking: Reported on 8/10/2023)     Allergies   Allergen Reactions   • Crab Extract Allergy Skin Test - Food Allergy      Lips swell   • Penicillins    • Pollen Extract    • Amoxicillin Rash     Face swells     Immunization History   Administered Date(s) Administered   • COVID-19 PFIZER VACCINE 0.3 ML IM 05/08/2021, 05/29/2021   • INFLUENZA 10/22/2015, 10/17/2018   • Influenza Quadrivalent Preservative Free 3 years and older IM 10/22/2015   • Influenza, injectable, quadrivalent, preservative free 0.5 mL 10/19/2022   • Tdap 06/14/2018     Objective     /72   Pulse 55   Temp (!) 97.1 °F (36.2 °C) (Temporal)   Resp 16   Ht 5' 1.5\" (1.562 m)   Wt 77.4 kg (170 lb 9.6 oz)   SpO2 99%   BMI 31.71 kg/m²     Physical Exam  Constitutional:       Appearance: Normal appearance.   Eyes:      General: Lids are normal.         Right eye: Discharge present.      Extraocular Movements: "      Right eye: Normal extraocular motion.      Conjunctiva/sclera:      Right eye: Right conjunctiva is injected. No hemorrhage.  Cardiovascular:      Rate and Rhythm: Normal rate and regular rhythm.      Pulses: Normal pulses.   Pulmonary:      Effort: Pulmonary effort is normal.      Breath sounds: Normal breath sounds.   Musculoskeletal:      Right hand: Swelling (Predominantly at PIP) and tenderness (Predominantly PIP) present. Decreased range of motion (With both flexion and extension the joint.). Normal sensation. Normal capillary refill.      Left hand: Normal.   Neurological:      Mental Status: She is alert.

## 2024-07-15 NOTE — LETTER
July 15, 2024     Patient: Echo Dalton  YOB: 1970  Date of Visit: 7/15/2024      To Whom it May Concern:    Echo Dalton is under my professional care. April was seen in my office on 7/15/2024. April may return to work on Wednesday 7/17/2024  . Out of work since last Wednesday    If you have any questions or concerns, please don't hesitate to call.         Sincerely,          Vanda Bueno,         CC: No Recipients

## 2024-07-27 PROBLEM — S69.91XS FINGER INJURY, RIGHT, SEQUELA: Status: ACTIVE | Noted: 2024-07-27

## 2024-07-27 NOTE — ASSESSMENT & PLAN NOTE
Due to patient's occupation and need to safely use a firearm, would recommend orthopedic opinion with regards to treatment strategies for injury to right fifth digit.

## 2024-09-09 DIAGNOSIS — K21.9 GASTROESOPHAGEAL REFLUX DISEASE, UNSPECIFIED WHETHER ESOPHAGITIS PRESENT: ICD-10-CM

## 2024-09-09 NOTE — TELEPHONE ENCOUNTER
Reason for call:   [x] Refill   [] Prior Auth  [] Other:     Office:   [x] PCP/Provider -   [] Specialty/Provider -     Medication: al mag oxide-diphenhydramine-lidocaine viscous (MAGIC MOUTHWASH) 1:1:1 suspension Swish and swallow 10 mL every 4 (four) hours as needed for mouth pain or discomfort,         Pharmacy: [id number] - [place], [place] - [address]     Does the patient have enough for 3 days?   [] Yes   [x] No - Send as HP to POD

## 2024-11-01 ENCOUNTER — HOSPITAL ENCOUNTER (OUTPATIENT)
Dept: MAMMOGRAPHY | Facility: MEDICAL CENTER | Age: 54
Discharge: HOME | End: 2024-11-01
Payer: COMMERCIAL

## 2024-11-01 VITALS — BODY MASS INDEX: 31.28 KG/M2 | HEIGHT: 62 IN | WEIGHT: 170 LBS

## 2024-11-01 DIAGNOSIS — Z12.31 SCREENING MAMMOGRAM, ENCOUNTER FOR: ICD-10-CM

## 2024-11-01 PROCEDURE — 77067 SCR MAMMO BI INCL CAD: CPT

## 2024-11-01 PROCEDURE — 77063 BREAST TOMOSYNTHESIS BI: CPT

## 2024-11-08 ENCOUNTER — OFFICE VISIT (OUTPATIENT)
Dept: FAMILY MEDICINE CLINIC | Facility: CLINIC | Age: 54
End: 2024-11-08
Payer: COMMERCIAL

## 2024-11-08 DIAGNOSIS — M54.41 CHRONIC MIDLINE LOW BACK PAIN WITH BILATERAL SCIATICA: ICD-10-CM

## 2024-11-08 DIAGNOSIS — Z00.00 ANNUAL PHYSICAL EXAM: Primary | ICD-10-CM

## 2024-11-08 DIAGNOSIS — I10 ESSENTIAL HYPERTENSION: ICD-10-CM

## 2024-11-08 DIAGNOSIS — Z12.11 SCREEN FOR COLON CANCER: ICD-10-CM

## 2024-11-08 DIAGNOSIS — B35.6 TINEA CRURIS: ICD-10-CM

## 2024-11-08 DIAGNOSIS — E55.9 VITAMIN D DEFICIENCY: ICD-10-CM

## 2024-11-08 DIAGNOSIS — G89.29 CHRONIC MIDLINE LOW BACK PAIN WITH BILATERAL SCIATICA: ICD-10-CM

## 2024-11-08 DIAGNOSIS — M54.42 CHRONIC MIDLINE LOW BACK PAIN WITH BILATERAL SCIATICA: ICD-10-CM

## 2024-11-08 DIAGNOSIS — K21.9 GERD (GASTROESOPHAGEAL REFLUX DISEASE): ICD-10-CM

## 2024-11-08 DIAGNOSIS — E53.8 VITAMIN B12 DEFICIENCY: ICD-10-CM

## 2024-11-08 PROCEDURE — 96372 THER/PROPH/DIAG INJ SC/IM: CPT | Performed by: FAMILY MEDICINE

## 2024-11-08 PROCEDURE — 99396 PREV VISIT EST AGE 40-64: CPT | Performed by: FAMILY MEDICINE

## 2024-11-08 RX ORDER — CYANOCOBALAMIN 1000 UG/ML
1000 INJECTION, SOLUTION INTRAMUSCULAR; SUBCUTANEOUS
Status: SHIPPED | OUTPATIENT
Start: 2024-11-08

## 2024-11-08 RX ORDER — ERGOCALCIFEROL 1.25 MG/1
50000 CAPSULE, LIQUID FILLED ORAL WEEKLY
Qty: 12 CAPSULE | Refills: 1 | Status: SHIPPED | OUTPATIENT
Start: 2024-11-08 | End: 2024-11-25 | Stop reason: SDUPTHER

## 2024-11-08 RX ORDER — LOSARTAN POTASSIUM 100 MG/1
100 TABLET ORAL DAILY
Qty: 90 TABLET | Refills: 1 | Status: SHIPPED | OUTPATIENT
Start: 2024-11-08

## 2024-11-08 RX ORDER — METAXALONE 800 MG/1
800 TABLET ORAL 3 TIMES DAILY
Qty: 90 TABLET | Refills: 0 | Status: SHIPPED | OUTPATIENT
Start: 2024-11-08

## 2024-11-08 RX ORDER — CYCLOBENZAPRINE HCL 10 MG
10 TABLET ORAL 3 TIMES DAILY PRN
Qty: 30 TABLET | Refills: 2 | Status: SHIPPED | OUTPATIENT
Start: 2024-11-08

## 2024-11-08 RX ORDER — FLUCONAZOLE 150 MG/1
150 TABLET ORAL ONCE
Qty: 1 TABLET | Refills: 0 | Status: SHIPPED | OUTPATIENT
Start: 2024-11-08 | End: 2024-11-08

## 2024-11-08 RX ORDER — OMEPRAZOLE 40 MG/1
40 CAPSULE, DELAYED RELEASE ORAL DAILY
Qty: 90 CAPSULE | Refills: 0 | Status: SHIPPED | OUTPATIENT
Start: 2024-11-08

## 2024-11-08 NOTE — PATIENT INSTRUCTIONS
"Patient Education     Routine physical for adults   The Basics   Written by the doctors and editors at [facility]   What is a physical? -- A physical is a routine visit, or \"check-up,\" with your doctor. You might also hear it called a \"wellness visit\" or \"preventive visit.\"  During each visit, the doctor will:   Ask about your physical and mental health   Ask about your habits, behaviors, and lifestyle   Do an exam   Give you vaccines if needed   Talk to you about any medicines you take   Give advice about your health   Answer your questions  Getting regular check-ups is an important part of taking care of your health. It can help your doctor find and treat any problems you have. But it's also important for preventing health problems.  A routine physical is different from a \"sick visit.\" A sick visit is when you see a doctor because of a health concern or problem. Since physicals are scheduled ahead of time, you can think about what you want to ask the doctor.  How often should I get a physical? -- It depends on your age and health. In general, for people age 21 years and older:   If you are younger than 50 years, you might be able to get a physical every 3 years.   If you are 50 years or older, your doctor might recommend a physical every year.  If you have an ongoing health condition, like diabetes or high blood pressure, your doctor will probably want to see you more often.  What happens during a physical? -- In general, each visit will include:   Physical exam - The doctor or nurse will check your height, weight, heart rate, and blood pressure. They will also look at your eyes and ears. They will ask about how you are feeling and whether you have any symptoms that bother you.   Medicines - It's a good idea to bring a list of all the medicines you take to each doctor visit. Your doctor will talk to you about your medicines and answer any questions. Tell them if you are having any side effects that bother you. You " "should also tell them if you are having trouble paying for any of your medicines.   Habits and behaviors - This includes:   Your diet   Your exercise habits   Whether you smoke, drink alcohol, or use drugs   Whether you are sexually active   Whether you feel safe at home  Your doctor will talk to you about things you can do to improve your health and lower your risk of health problems. They will also offer help and support. For example, if you want to quit smoking, they can give you advice and might prescribe medicines. If you want to improve your diet or get more physical activity, they can help you with this, too.   Lab tests, if needed - The tests you get will depend on your age and situation. For example, your doctor might want to check your:   Cholesterol   Blood sugar   Iron level   Vaccines - The recommended vaccines will depend on your age, health, and what vaccines you already had. Vaccines are very important because they can prevent certain serious or deadly infections.   Discussion of screening - \"Screening\" means checking for diseases or other health problems before they cause symptoms. Your doctor can recommend screening based on your age, risk, and preferences. This might include tests to check for:   Cancer, such as breast, prostate, cervical, ovarian, colorectal, prostate, lung, or skin cancer   Sexually transmitted infections, such as chlamydia and gonorrhea   Mental health conditions like depression and anxiety  Your doctor will talk to you about the different types of screening tests. They can help you decide which screenings to have. They can also explain what the results might mean.   Answering questions - The physical is a good time to ask the doctor or nurse questions about your health. If needed, they can refer you to other doctors or specialists, too.  Adults older than 65 years often need other care, too. As you get older, your doctor will talk to you about:   How to prevent falling at " home   Hearing or vision tests   Memory testing   How to take your medicines safely   Making sure that you have the help and support you need at home  All topics are updated as new evidence becomes available and our peer review process is complete.  This topic retrieved from [employer] on: May 02, 2024.  Topic 310536 Version 1.0  Release: 32.4.3 - C32.122  © 2024 UpToDate, Inc. and/or its affiliates. All rights reserved.  Consumer Information Use and Disclaimer   Disclaimer: This generalized information is a limited summary of diagnosis, treatment, and/or medication information. It is not meant to be comprehensive and should be used as a tool to help the user understand and/or assess potential diagnostic and treatment options. It does NOT include all information about conditions, treatments, medications, side effects, or risks that may apply to a specific patient. It is not intended to be medical advice or a substitute for the medical advice, diagnosis, or treatment of a health care provider based on the health care provider's examination and assessment of a patient's specific and unique circumstances. Patients must speak with a health care provider for complete information about their health, medical questions, and treatment options, including any risks or benefits regarding use of medications. This information does not endorse any treatments or medications as safe, effective, or approved for treating a specific patient. UpToDate, Inc. and its affiliates disclaim any warranty or liability relating to this information or the use thereof.The use of this information is governed by the Terms of Use, available at https://www.woltersDyynouwer.com/en/know/clinical-effectiveness-terms. 2024© UpToDate, Inc. and its affiliates and/or licensors. All rights reserved.  Copyright   © 2024 UpToDate, Inc. and/or its affiliates. All rights reserved.

## 2024-11-08 NOTE — PROGRESS NOTES
Adult Annual Physical  Name: Echo Dalton      : 1970      MRN: [id number]  Encounter Provider: Vanda Bueno DO  Encounter Date: 2024   Encounter department: [facility] PRIMARY CARE    Assessment & Plan  Annual physical exam         Chronic midline low back pain with bilateral sciatica    Orders:    cyclobenzaprine (FLEXERIL) 10 mg tablet; Take 1 tablet (10 mg total) by mouth 3 (three) times a day as needed for muscle spasms    metaxalone (SKELAXIN) 800 mg tablet; Take 1 tablet (800 mg total) by mouth 3 (three) times a day    Essential hypertension    Orders:    losartan (COZAAR) 100 MG tablet; Take 1 tablet (100 mg total) by mouth daily    GERD (gastroesophageal reflux disease)    Orders:    omeprazole (PriLOSEC) 40 MG capsule; Take 1 capsule (40 mg total) by mouth daily    Tinea cruris    Orders:    fluconazole (DIFLUCAN) 150 mg tablet; Take 1 tablet (150 mg total) by mouth once for 1 dose    Vitamin D deficiency         Vitamin B12 deficiency    Orders:    cyanocobalamin injection 1,000 mcg    Screen for colon cancer    Orders:    Cologuard      Immunizations and preventive care screenings were discussed with patient today. Appropriate education was printed on patient's after visit summary.    Counseling:  Alcohol/drug use: discussed moderation in alcohol intake, the recommendations for healthy alcohol use  Dental Health: discussed importance of regular tooth brushing, flossing, and dental visits.  Injury prevention: discussed safety/seat belts, safety helmets, smoke detectors, carbon monoxide detectors  Sexual health: discussed sexually transmitted diseases, partner selection, use of condoms, avoidance of unintended pregnancy, and contraceptive alternatives.  Exercise: the importance of regular exercise/physical activity was discussed. Recommend exercise 3-5 times per week for at least 30 minutes.          History of Present Illness     Adult Annual Physical:  Patient presents for  "annual physical.     Diet and Physical Activity:  - Diet/Nutrition:. Could do better  - Exercise: walking. Work    General Health:  - Sleep: sleeps well.  - Hearing: normal hearing bilateral ears.  - Vision: no vision problems.  - Dental: regular dental visits.    /GYN Health:  - Follows with GYN: yes.   - Menopause: postmenopausal.   - Contraception:. same sex partnee      Review of Systems      Objective     /72   Pulse 72   Temp 97.5 °F (36.4 °C) (Temporal)   Ht 5' 1.5\" (1.562 m)   Wt 80 kg (176 lb 6.4 oz)   SpO2 99%   BMI 32.79 kg/m²     Physical Exam  Vitals reviewed.   Constitutional:       General: She is not in acute distress.     Appearance: Normal appearance. She is well-developed.   HENT:      Head: Normocephalic.      Right Ear: Tympanic membrane normal.      Left Ear: Tympanic membrane normal.      Nose: Nose normal.      Mouth/Throat:      Mouth: Mucous membranes are moist.   Eyes:      Conjunctiva/sclera: Conjunctivae normal.      Pupils: Pupils are equal, round, and reactive to light.   Neck:      Vascular: No carotid bruit.   Cardiovascular:      Rate and Rhythm: Normal rate and regular rhythm.      Pulses: Normal pulses.      Heart sounds: No murmur heard.  Pulmonary:      Effort: Pulmonary effort is normal.      Breath sounds: Normal breath sounds.   Abdominal:      General: Bowel sounds are normal.      Palpations: Abdomen is soft. There is no mass.      Tenderness: There is no abdominal tenderness.   Musculoskeletal:         General: Normal range of motion.      Lumbar back: Spasms present. Negative right straight leg raise test and negative left straight leg raise test.      Comments: Gait normal   Skin:     Findings: No rash.   Neurological:      Mental Status: She is alert and oriented to person, place, and time.      Deep Tendon Reflexes: Reflexes are normal and symmetric.   Psychiatric:         Mood and Affect: Mood normal.         Behavior: Behavior normal.         Thought " Content: Thought content normal.         Judgment: Judgment normal.

## 2024-11-15 ENCOUNTER — TELEPHONE (OUTPATIENT)
Age: 54
End: 2024-11-15

## 2024-11-15 DIAGNOSIS — M54.50 ACUTE MIDLINE LOW BACK PAIN WITHOUT SCIATICA: Primary | ICD-10-CM

## 2024-11-15 DIAGNOSIS — M54.9 MID BACK PAIN ON LEFT SIDE: ICD-10-CM

## 2024-11-15 RX ORDER — LIDOCAINE 4 G/G
1 PATCH TOPICAL
Qty: 30 PATCH | Refills: 1 | Status: SHIPPED | OUTPATIENT
Start: 2024-11-15 | End: 2024-11-19

## 2024-11-15 NOTE — TELEPHONE ENCOUNTER
Patient called the office requesting for a script for lidocaine patches. Patient also states she forgot to get a Vit b 12 shot while she was at the practice and would like to schedule to get one at this time. Please review and advise.

## 2024-11-18 NOTE — TELEPHONE ENCOUNTER
I called and left the patient a message making her aware she may schedule a nurse visit for her B12 at her convenience.

## 2024-11-19 DIAGNOSIS — G89.29 CHRONIC MIDLINE LOW BACK PAIN WITH BILATERAL SCIATICA: ICD-10-CM

## 2024-11-19 DIAGNOSIS — M54.42 CHRONIC MIDLINE LOW BACK PAIN WITH BILATERAL SCIATICA: ICD-10-CM

## 2024-11-19 DIAGNOSIS — M54.50 ACUTE MIDLINE LOW BACK PAIN WITHOUT SCIATICA: Primary | ICD-10-CM

## 2024-11-19 DIAGNOSIS — M54.41 CHRONIC MIDLINE LOW BACK PAIN WITH BILATERAL SCIATICA: ICD-10-CM

## 2024-11-19 DIAGNOSIS — M54.9 MID BACK PAIN ON LEFT SIDE: ICD-10-CM

## 2024-11-19 RX ORDER — LIDOCAINE 50 MG/G
1 PATCH TOPICAL DAILY
Qty: 30 PATCH | Refills: 1 | Status: SHIPPED | OUTPATIENT
Start: 2024-11-19

## 2024-11-19 NOTE — TELEPHONE ENCOUNTER
Patient called the office asking if the provider could send in her lidocaine patches as 5% instead of the 4%. As per pt her insurance would only cover this if its 5% or higher. Please review and advise.

## 2024-11-20 ENCOUNTER — TELEPHONE (OUTPATIENT)
Dept: FAMILY MEDICINE CLINIC | Facility: CLINIC | Age: 54
End: 2024-11-20

## 2024-11-20 NOTE — TELEPHONE ENCOUNTER
PA for lidocaine (Lidoderm) 5 % SUBMITTED to [facility] [employer]    via    []CMM-KEY:   [x][employer]-Case ID # [id number]   []Availity-Auth ID # NDC #   []Faxed to plan   []Other website   []Phone call Case ID #     [x]PA sent as URGENT    All office notes, labs and other pertaining documents and studies sent. Clinical questions answered. Awaiting determination from insurance company.     Turnaround time for your insurance to make a decision on your Prior Authorization can take 7-21 business days.

## 2024-11-22 ENCOUNTER — TELEPHONE (OUTPATIENT)
Age: 54
End: 2024-11-22

## 2024-11-22 NOTE — TELEPHONE ENCOUNTER
Called [employer] to check on the status of the patient's prior authorization. Agent stated we should have a response within 72 hours.

## 2024-11-22 NOTE — TELEPHONE ENCOUNTER
Called patient and provided her appt date and time.     Patient states the person who call her with her medication approval was unable to see when her upcoming appt was.

## 2024-11-22 NOTE — TELEPHONE ENCOUNTER
PA for lidocaine (Lidoderm) 5 %  APPROVED     Date(s) approved October 21, 2024 to November 22, 2025    Case #[id number]       Patient advised by          [][employer] Message  [x]Phone call   []LMOM  []L/M to call office as no active Communication consent on file  []Unable to leave detailed message as VM not approved on Communication consent       Pharmacy advised by    [x]Fax  []Phone call    Approval letter scanned into Media No Not Available

## 2024-11-25 ENCOUNTER — OFFICE VISIT (OUTPATIENT)
Dept: FAMILY MEDICINE CLINIC | Facility: CLINIC | Age: 54
End: 2024-11-25
Payer: COMMERCIAL

## 2024-11-25 DIAGNOSIS — B35.6 TINEA CRURIS: ICD-10-CM

## 2024-11-25 DIAGNOSIS — R21 RASH IN ADULT: ICD-10-CM

## 2024-11-25 DIAGNOSIS — J40 BRONCHITIS: ICD-10-CM

## 2024-11-25 DIAGNOSIS — J06.9 UPPER RESPIRATORY TRACT INFECTION, UNSPECIFIED TYPE: Primary | ICD-10-CM

## 2024-11-25 DIAGNOSIS — E55.9 VITAMIN D DEFICIENCY: ICD-10-CM

## 2024-11-25 DIAGNOSIS — E53.8 VITAMIN B12 DEFICIENCY: ICD-10-CM

## 2024-11-25 PROCEDURE — 99213 OFFICE O/P EST LOW 20 MIN: CPT | Performed by: FAMILY MEDICINE

## 2024-11-25 PROCEDURE — 96372 THER/PROPH/DIAG INJ SC/IM: CPT | Performed by: FAMILY MEDICINE

## 2024-11-25 RX ORDER — CLOTRIMAZOLE AND BETAMETHASONE DIPROPIONATE 10; .64 MG/G; MG/G
CREAM TOPICAL 2 TIMES DAILY
Qty: 45 G | Refills: 1 | Status: SHIPPED | OUTPATIENT
Start: 2024-11-25

## 2024-11-25 RX ORDER — PREDNISONE 10 MG/1
TABLET ORAL
Qty: 21 TABLET | Refills: 0 | Status: SHIPPED | OUTPATIENT
Start: 2024-11-25

## 2024-11-25 RX ORDER — BENZONATATE 100 MG/1
100 CAPSULE ORAL 3 TIMES DAILY PRN
Qty: 30 CAPSULE | Refills: 0 | Status: SHIPPED | OUTPATIENT
Start: 2024-11-25

## 2024-11-25 RX ORDER — AZITHROMYCIN 250 MG/1
TABLET, FILM COATED ORAL
Qty: 6 TABLET | Refills: 0 | Status: SHIPPED | OUTPATIENT
Start: 2024-11-25 | End: 2024-11-29

## 2024-11-25 RX ORDER — CYANOCOBALAMIN 1000 UG/ML
1000 INJECTION, SOLUTION INTRAMUSCULAR; SUBCUTANEOUS
Status: SHIPPED | OUTPATIENT
Start: 2024-11-25

## 2024-11-25 RX ORDER — ALBUTEROL SULFATE 90 UG/1
1 INHALANT RESPIRATORY (INHALATION) 4 TIMES DAILY
Qty: 6.7 G | Refills: 5 | Status: SHIPPED | OUTPATIENT
Start: 2024-11-25

## 2024-11-25 RX ORDER — ERGOCALCIFEROL 1.25 MG/1
50000 CAPSULE, LIQUID FILLED ORAL WEEKLY
Qty: 12 CAPSULE | Refills: 1 | Status: SHIPPED | OUTPATIENT
Start: 2024-11-25

## 2024-11-25 RX ADMIN — CYANOCOBALAMIN 1000 MCG: 1000 INJECTION, SOLUTION INTRAMUSCULAR; SUBCUTANEOUS at 14:12

## 2024-11-25 NOTE — PROGRESS NOTES
"Name: Echo Dalton      : 1970      MRN: [id number]  Encounter Provider: Vanda Bueno DO  Encounter Date: 2024   Encounter department: [facility]  :  Assessment & Plan  Upper respiratory tract infection, unspecified type    Orders:  •  azithromycin (ZITHROMAX) 250 mg tablet; Take 2 tablets today then 1 tablet daily x 4 days  •  benzonatate (TESSALON PERLES) 100 mg capsule; Take 1 capsule (100 mg total) by mouth 3 (three) times a day as needed for cough  •  predniSONE 10 mg tablet; Take PO 6-5-4-3-2-1  •  albuterol (Proventil HFA) 90 mcg/act inhaler; Inhale 1 puff 4 (four) times a day prn    Bronchitis    Orders:  •  albuterol (Proventil HFA) 90 mcg/act inhaler; Inhale 1 puff 4 (four) times a day prn    Rash in adult    Orders:  •  clotrimazole-betamethasone (LOTRISONE) 1-0.05 % cream; Apply topically 2 (two) times a day    Tinea cruris    Orders:  •  clotrimazole-betamethasone (LOTRISONE) 1-0.05 % cream; Apply topically 2 (two) times a day    Vitamin D deficiency    Orders:  •  ergocalciferol (VITAMIN D2) 50,000 units; Take 1 capsule (50,000 Units total) by mouth once a week    Vitamin B12 deficiency    Orders:  •  cyanocobalamin injection 1,000 mcg           History of Present Illness     URI   This is a new problem. The current episode started in the past 7 days. The problem has been waxing and waning. The maximum temperature recorded prior to her arrival was 100.4 - 100.9 F. Associated symptoms include coughing (yellow thick mucus), rhinorrhea, sinus pain and a sore throat. Treatments tried: therflu , mucinex, tessalon perles.       Review of Systems   HENT:  Positive for rhinorrhea, sinus pain and sore throat.    Respiratory:  Positive for cough (yellow thick mucus).           Objective   /60   Pulse 88   Temp 98.3 °F (36.8 °C) (Temporal)   Ht 5' 1\" (1.549 m)   SpO2 96%   BMI 33.33 kg/m²      Physical Exam  Constitutional:       Appearance: Normal " appearance. She is ill-appearing. She is not toxic-appearing.   HENT:      Right Ear: Tympanic membrane has decreased mobility.      Left Ear: Tympanic membrane has decreased mobility.      Nose: Mucosal edema and rhinorrhea present.      Mouth/Throat:      Pharynx: Postnasal drip present.   Pulmonary:      Effort: Pulmonary effort is normal.      Breath sounds: Normal breath sounds.      Comments: Raspy cough noted  Lymphadenopathy:      Cervical: No cervical adenopathy.   Skin:     Findings: Rash (Candidal intertrigo  abdominal fold) present.   Neurological:      Mental Status: She is alert and oriented to person, place, and time.   Psychiatric:         Mood and Affect: Mood normal.         Behavior: Behavior normal.         Thought Content: Thought content normal.         Judgment: Judgment normal.

## 2024-11-25 NOTE — LETTER
November 25, 2024     Patient: Echo Dalton  YOB: 1970  Date of Visit: 11/25/2024      To Whom it May Concern:    Echo Dalton is under my professional care. April was seen in my office on 11/25/2024. Medically excused since Wednesday 11/20 due to respiratory illnessApril may return to work on Thursday 11/28 .    If you have any questions or concerns, please don't hesitate to call.         Sincerely,          Vanda Bueno, DO

## 2024-12-02 ENCOUNTER — ANNUAL EXAM (OUTPATIENT)
Dept: GYNECOLOGY | Facility: CLINIC | Age: 54
End: 2024-12-02
Payer: COMMERCIAL

## 2024-12-02 VITALS — DIASTOLIC BLOOD PRESSURE: 76 MMHG | WEIGHT: 179 LBS | BODY MASS INDEX: 33.82 KG/M2 | SYSTOLIC BLOOD PRESSURE: 138 MMHG

## 2024-12-02 DIAGNOSIS — Z12.11 COLON CANCER SCREENING: ICD-10-CM

## 2024-12-02 DIAGNOSIS — Z01.411 ENCOUNTER FOR GYNECOLOGICAL EXAMINATION (GENERAL) (ROUTINE) WITH ABNORMAL FINDINGS: Primary | ICD-10-CM

## 2024-12-02 DIAGNOSIS — Z12.4 ENCOUNTER FOR PAPANICOLAOU SMEAR FOR CERVICAL CANCER SCREENING: ICD-10-CM

## 2024-12-02 PROCEDURE — G0145 SCR C/V CYTO,THINLAYER,RESCR: HCPCS | Performed by: STUDENT IN AN ORGANIZED HEALTH CARE EDUCATION/TRAINING PROGRAM

## 2024-12-02 PROCEDURE — S0612 ANNUAL GYNECOLOGICAL EXAMINA: HCPCS | Performed by: OBSTETRICS & GYNECOLOGY

## 2024-12-02 PROCEDURE — G0476 HPV COMBO ASSAY CA SCREEN: HCPCS | Performed by: OBSTETRICS & GYNECOLOGY

## 2024-12-02 NOTE — PROGRESS NOTES
Assessment/Plan:     Recommended monthly SBE, annual CBE and annual screening mammo. ASCCP guidelines reviewed and pap with cotesting repeated, although difficult. Colonoscopy referral completed. The patient denies STI risk factors and declines testing at this time. Reviewed diet/activity recommendations Calcium 1200 mg and Vit D 600-1000 IU daily.  Discussed postmenopausal considerations and symptoms to report.        Diagnoses and all orders for this visit:    Encounter for gynecological examination (general) (routine) with abnormal findings    Encounter for Papanicolaou smear for cervical cancer screening  -     Cancel: Liquid-based pap, screening  -     Liquid-based pap, screening    Colon cancer screening  -     Ambulatory Referral to Gastroenterology; Future        Subjective:      Patient ID: Echo Dalton is a 54 y.o. female.    This patient presents for routine annual gyn exam.  She denies  bleeding or spotting, VM sx, pelvic pain, dyspareunia, breast concerns, abnormal discharge, bowel/bladder dysfunction, depression/anx.   Sexually active, female partner.    Pap/HPV, 11/2023 LSIL with neg HPV. Did not RTO for colpo despite 5 letters. Will repeat pap. Mammography 11/1/24. Cologuard ordered by PCP.          The following portions of the patient's history were reviewed and updated as appropriate: allergies, current medications, past family history, past medical history, past social history, past surgical history and problem list.    Review of Systems   Constitutional: Negative.    Respiratory: Negative.     Cardiovascular: Negative.    Gastrointestinal: Negative.    Endocrine: Negative.    Genitourinary:  Negative for dyspareunia, dysuria, frequency, pelvic pain, urgency, vaginal bleeding, vaginal discharge and vaginal pain.   Musculoskeletal: Negative.    Skin: Negative.    Neurological: Negative.    Psychiatric/Behavioral: Negative.           Objective:      /76 (BP Location: Left arm, Patient  Position: Sitting, Cuff Size: Standard)   Wt 81.2 kg (179 lb)   BMI 33.82 kg/m²          Physical Exam  Vitals and nursing note reviewed. Exam conducted with a chaperone present.   Constitutional:       Appearance: Normal appearance. She is well-developed.   HENT:      Head: Normocephalic and atraumatic.   Neck:      Thyroid: No thyroid mass or thyromegaly.   Cardiovascular:      Rate and Rhythm: Normal rate and regular rhythm.      Heart sounds: Normal heart sounds.   Pulmonary:      Effort: Pulmonary effort is normal.      Breath sounds: Normal breath sounds.   Chest:   Breasts:     Breasts are symmetrical.      Right: No inverted nipple, mass, nipple discharge, skin change or tenderness.      Left: No inverted nipple, mass, nipple discharge, skin change or tenderness.   Abdominal:      General: Bowel sounds are normal.      Palpations: Abdomen is soft.      Tenderness: There is no abdominal tenderness.      Hernia: There is no hernia in the left inguinal area or right inguinal area.   Genitourinary:     General: Normal vulva.      Exam position: Supine.      Pubic Area: No rash.       Labia:         Right: No rash, tenderness, lesion or injury.         Left: No rash, tenderness, lesion or injury.       Urethra: No prolapse, urethral pain, urethral swelling or urethral lesion.      Vagina: Normal. No signs of injury and foreign body. No vaginal discharge, erythema, tenderness, bleeding, lesions or prolapsed vaginal walls.      Cervix: No cervical motion tenderness.      Uterus: Not deviated, not enlarged, not fixed, not tender and no uterine prolapse.       Adnexa:         Right: No mass, tenderness or fullness.          Left: No mass, tenderness or fullness.        Rectum: No external hemorrhoid.      Comments: Urethra normal without lesions  No bladder tenderness  Limited exam secondary to anxiety, vaginismus  difficult pap, could not visualize cervix  Musculoskeletal:         General: Normal range of motion.       Cervical back: Normal range of motion and neck supple.   Lymphadenopathy:      Lower Body: No right inguinal adenopathy. No left inguinal adenopathy.   Skin:     General: Skin is warm and dry.   Neurological:      Mental Status: She is alert and oriented to person, place, and time.   Psychiatric:         Speech: Speech normal.         Behavior: Behavior normal. Behavior is cooperative.

## 2024-12-08 VITALS
DIASTOLIC BLOOD PRESSURE: 72 MMHG | WEIGHT: 176.4 LBS | BODY MASS INDEX: 32.46 KG/M2 | HEIGHT: 62 IN | OXYGEN SATURATION: 99 % | HEART RATE: 72 BPM | SYSTOLIC BLOOD PRESSURE: 122 MMHG | TEMPERATURE: 97.5 F

## 2024-12-08 RX ADMIN — CYANOCOBALAMIN 1000 MCG: 1000 INJECTION, SOLUTION INTRAMUSCULAR; SUBCUTANEOUS at 15:55

## 2024-12-10 LAB
LAB AP GYN PRIMARY INTERPRETATION: ABNORMAL
Lab: ABNORMAL
PATH INTERP SPEC-IMP: ABNORMAL

## 2024-12-10 PROCEDURE — G0124 SCREEN C/V THIN LAYER BY MD: HCPCS | Performed by: STUDENT IN AN ORGANIZED HEALTH CARE EDUCATION/TRAINING PROGRAM

## 2024-12-11 ENCOUNTER — RESULTS FOLLOW-UP (OUTPATIENT)
Dept: GYNECOLOGY | Facility: CLINIC | Age: 54
End: 2024-12-11

## 2024-12-26 VITALS
SYSTOLIC BLOOD PRESSURE: 110 MMHG | OXYGEN SATURATION: 96 % | HEART RATE: 88 BPM | TEMPERATURE: 98.3 F | HEIGHT: 61 IN | DIASTOLIC BLOOD PRESSURE: 60 MMHG | BODY MASS INDEX: 33.33 KG/M2

## 2025-01-08 ENCOUNTER — OFFICE VISIT (OUTPATIENT)
Dept: FAMILY MEDICINE CLINIC | Facility: CLINIC | Age: 55
End: 2025-01-08
Payer: COMMERCIAL

## 2025-01-08 VITALS
BODY MASS INDEX: 33.12 KG/M2 | HEIGHT: 61 IN | HEART RATE: 84 BPM | WEIGHT: 175.4 LBS | SYSTOLIC BLOOD PRESSURE: 128 MMHG | OXYGEN SATURATION: 98 % | TEMPERATURE: 97.4 F | DIASTOLIC BLOOD PRESSURE: 82 MMHG

## 2025-01-08 DIAGNOSIS — R19.7 DIARRHEA, UNSPECIFIED TYPE: ICD-10-CM

## 2025-01-08 DIAGNOSIS — R11.2 NAUSEA AND VOMITING, UNSPECIFIED VOMITING TYPE: Primary | ICD-10-CM

## 2025-01-08 PROCEDURE — 99213 OFFICE O/P EST LOW 20 MIN: CPT | Performed by: FAMILY MEDICINE

## 2025-01-08 RX ORDER — ONDANSETRON 4 MG/1
4 TABLET, FILM COATED ORAL DAILY PRN
Qty: 10 TABLET | Refills: 0 | Status: SHIPPED | OUTPATIENT
Start: 2025-01-08

## 2025-01-08 NOTE — PATIENT INSTRUCTIONS
BRAT diet and stay well hydrated and use gatorade prn and may use Zofran once daily prn nausea and vomiting. Diarrhea is improving and may use pepto Bismol prn diarrhea which is improving and more formed today.

## 2025-01-08 NOTE — PROGRESS NOTES
"Name: Echo Dalton      : 1970      MRN: [id number]  Encounter Provider: Paulie Osorio DO  Encounter Date: 2025   Encounter department: [facility]  :  Chief Complaint   Patient presents with    Abdominal Pain     naus    Nausea    Diarrhea     X 3 days. Taking imodium      Patient Instructions   BRAT diet and stay well hydrated and use gatorade prn and may use Zofran once daily prn nausea and vomiting. Diarrhea is improving and may use pepto Bismol prn diarrhea which is improving and more formed today.     Assessment & Plan  Nausea and vomiting, unspecified vomiting type  stable  Orders:    ondansetron (ZOFRAN) 4 mg tablet; Take 1 tablet (4 mg total) by mouth daily as needed for nausea or vomiting    Diarrhea, unspecified type  Start pepto Bismol and gatorade prn and call if worse.               History of Present Illness     Abdominal Pain (naus)  Nausea  Diarrhea (X 3 days. Taking imodium )      Abdominal Pain  Associated symptoms include diarrhea and nausea.   Nausea  Associated symptoms include abdominal pain and nausea.   Diarrhea   Associated symptoms include abdominal pain.     Review of Systems   Constitutional: Negative.    HENT: Negative.     Eyes: Negative.    Respiratory: Negative.     Cardiovascular: Negative.    Gastrointestinal:  Positive for abdominal pain, diarrhea and nausea.   Endocrine: Negative.    Genitourinary: Negative.    Musculoskeletal: Negative.    Skin: Negative.    Allergic/Immunologic: Negative.    Neurological: Negative.    Hematological: Negative.    Psychiatric/Behavioral: Negative.         Objective   /82   Pulse 84   Temp (!) 97.4 °F (36.3 °C)   Ht 5' 1\" (1.549 m)   Wt 79.6 kg (175 lb 6.4 oz)   SpO2 98%   BMI 33.14 kg/m²      Physical Exam  Constitutional:       Appearance: She is well-developed.   HENT:      Head: Normocephalic and atraumatic.      Right Ear: External ear normal.      Left Ear: External ear normal.      " Nose: Nose normal.   Eyes:      Conjunctiva/sclera: Conjunctivae normal.      Pupils: Pupils are equal, round, and reactive to light.   Cardiovascular:      Rate and Rhythm: Normal rate and regular rhythm.      Heart sounds: Normal heart sounds.   Pulmonary:      Effort: Pulmonary effort is normal.      Breath sounds: Normal breath sounds.   Abdominal:      General: Abdomen is flat. Bowel sounds are normal.      Palpations: Abdomen is soft.      Tenderness: There is no abdominal tenderness.      Hernia: No hernia is present.   Musculoskeletal:         General: Normal range of motion.      Cervical back: Normal range of motion and neck supple.   Skin:     General: Skin is warm and dry.      Capillary Refill: Capillary refill takes less than 2 seconds.   Neurological:      General: No focal deficit present.      Mental Status: She is alert. She is disoriented.      Deep Tendon Reflexes: Reflexes are normal and symmetric.   Psychiatric:         Mood and Affect: Mood normal.         Behavior: Behavior normal.       Administrative Statements   I have spent a total time of 20 minutes in caring for this patient on the day of the visit/encounter including Diagnostic results, Prognosis, Risks and benefits of tx options, Instructions for management, Patient and family education, Importance of tx compliance, Risk factor reductions, Impressions, Counseling / Coordination of care, Documenting in the medical record, Reviewing / ordering tests, medicine, procedures  , and Obtaining or reviewing history  .

## 2025-01-14 ENCOUNTER — TELEPHONE (OUTPATIENT)
Age: 55
End: 2025-01-14

## 2025-01-14 DIAGNOSIS — F40.232 FEAR OF OTHER MEDICAL CARE: Primary | ICD-10-CM

## 2025-01-14 RX ORDER — LORAZEPAM 0.5 MG/1
0.5 TABLET ORAL
Qty: 4 TABLET | Refills: 0 | Status: SHIPPED | OUTPATIENT
Start: 2025-01-14

## 2025-01-14 NOTE — TELEPHONE ENCOUNTER
Spoke with patient she said you never did prescribe this before. She wanted at least 10 pills on hand because she is going to be getting a lot of dental work done.  She has not gone to the dentist in years because of her anxiety. Not sure if you needed to see her to discuss this because when she needs more  she will be calling.

## 2025-01-14 NOTE — TELEPHONE ENCOUNTER
I do not see any Ativan listed on her PDMP but I will call in a dose or 2 so she can have it available for her dental visits.

## 2025-01-14 NOTE — TELEPHONE ENCOUNTER
Patient called in she is having dental work done she was told by the dentist that her PCP should prescribe Ativan so patient is requesting a script for Ativan Please Adise

## 2025-01-15 NOTE — TELEPHONE ENCOUNTER
I already sent for you.  She should take it 1 hour before the dental procedure.  She can repeat it if she feels necessary right before any procedure.  She cannot drive with this.  When she needs more she can call

## 2025-01-16 DIAGNOSIS — F10.982 ALCOHOL-INDUCED INSOMNIA (HCC): Primary | ICD-10-CM

## 2025-01-16 RX ORDER — CLONAZEPAM 0.5 MG/1
0.5 TABLET ORAL
Qty: 30 TABLET | Refills: 0 | Status: SHIPPED | OUTPATIENT
Start: 2025-01-16

## 2025-01-16 NOTE — TELEPHONE ENCOUNTER
Pt aware of one time ativan rx. She would like to speak with Dr Coughlin about her situation, which she says she is already aware of. I tried to make an apt, no apts until may and pt will not see any other provider. She would like to stop drinking, and she's working on it, but she's having trouble sleeping. She wants to stay on the right track with being sober. She would like to know if dr. Coughlin with give her a regular ativan rx for night time. Please advise.    Implemented All Fall Risk Interventions:  [place] to call system. Call bell, personal items and telephone within reach. Instruct patient to call for assistance. Room bathroom lighting operational. Non-slip footwear when patient is off stretcher. Physically safe environment: no spills, clutter or unnecessary equipment. Stretcher in lowest position, wheels locked, appropriate side rails in place. Provide visual cue, wrist band, yellow gown, etc. Monitor gait and stability. Monitor for mental status changes and reorient to person, place, and time. Review medications for side effects contributing to fall risk. Reinforce activity limits and safety measures with patient and family.

## 2025-01-16 NOTE — TELEPHONE ENCOUNTER
I called and spoke with the patient and made her aware and she is agreeable to giving the klonopin a try. Please send to the pharmacy for her.

## 2025-01-16 NOTE — TELEPHONE ENCOUNTER
Probably a better option for her at nighttime to help her sleep while she is attempting reducing and eliminating alcohol would be Klonopin not Ativan.  That would be my suggestion

## 2025-01-21 ENCOUNTER — TELEPHONE (OUTPATIENT)
Age: 55
End: 2025-01-21

## 2025-01-21 NOTE — TELEPHONE ENCOUNTER
Patient with a history of laparoscopic gastric bypass Darion-en-Y done in 2011 by Dr. Juan Francisco Abdi.  Subsequently in 2013 she had abdominal pain and required a laparoscopic repair of Castillo hernia defect by Dr. Juan Francisco Abdi.  She also has a history of laparoscopic gallbladder done at [facility] in 2018.  She is up-to-date with Dr. Chicas with EGD done last in June 2014 with Dr Juan Francisco Abdi  I would encourage her to contact his office ASAP

## 2025-01-21 NOTE — TELEPHONE ENCOUNTER
Patient was working out and her hernia popped out it would not go back in for a while. It finally did but is very painful. What should she do?

## 2025-01-22 ENCOUNTER — NURSE TRIAGE (OUTPATIENT)
Age: 55
End: 2025-01-22

## 2025-01-22 NOTE — TELEPHONE ENCOUNTER
"PT called in. PT s/p Darion-En-Y with Dr. Juan Francisco Abdi in 2011.     PT reports new hernia at incision site evaluated and reduced by PCP. PT reports that hernia popped back out and is painful. PT was referred back to Dr. Juan Francisco Abdi for evaluation and treatment.     Called [facility]  [place] office clerical backline and spoke with Lyn. Per kiana Nicholson to schedule PT in next available follow-up slot with Dr. Juan Francisco Abdi.    Appointment scheduled. Advised PT that she will be placed on waitlist and will be notified via [facility] if sooner appointment becomes available. Also advised PT to call back with any worsening Sx. PT verbalized understanding and agreeable to plan.      Answer Assessment - Initial Assessment Questions  1. ONSET:  \"When did this first appear?\"      A few weeks ago  2. APPEARANCE: \"What does it look like?\"      lump  3. SIZE: \"How big is it?\" (inches, cm or compare to coins, fruit)      small  4. LOCATION: \"Where exactly is the hernia located?\"      Incision site  5. PATTERN: \"Does the swelling come and go, or has it been constant since it started?\"      constant  6. PAIN: \"Is there any pain?\" If Yes, ask: \"How bad is it?\"  (Scale 1-10; or mild, moderate, severe)      yes  7. DIAGNOSIS: \"Have you been seen by a doctor (or NP/PA) for this?\" \"Did the doctor diagnose you as having a hernia?\"      hernia  8. OTHER SYMPTOMS: \"Do you have any other symptoms?\" (e.g., fever, abdomen pain, vomiting)      denies  9. PREGNANCY: \"Is there any chance you are pregnant?\" \"When was your last menstrual period?\"      no    Protocols used: Hernia-Adult-[place]    "

## 2025-02-20 NOTE — PROGRESS NOTES
OFFICE VISIT - BARIATRIC SURGERY  Echo Dalton 55 y.o. female MRN: [id number]  Unit/Bed#:  Encounter: [id number]      HPI:  Echo Dalton is a 55 y.o. female status post Darion-En-Y Gastric Bypass with Dr. Juan Francisco Abdi in 2011. Comes to the office today to discuss a hernia.    Subjective     Patient states that she has known about a right sided hernia for several years at this point, however recently it has started to cause her pain. This is especially pronounced during exercise. At time she does have to manually reduce the bulge, with improvement in her symptoms. She denies obstructive symptoms.    Review of Systems   Constitutional:  Negative for chills and fever.   HENT:  Negative for ear pain and sore throat.    Eyes:  Negative for pain and visual disturbance.   Respiratory:  Negative for cough and shortness of breath.    Cardiovascular:  Negative for chest pain and palpitations.   Gastrointestinal:  Positive for abdominal pain. Negative for constipation, nausea and vomiting.   Genitourinary:  Negative for dysuria and hematuria.   Musculoskeletal:  Negative for arthralgias and back pain.   Skin:  Negative for color change and rash.   Neurological:  Negative for seizures and syncope.   All other systems reviewed and are negative.      Historical Information   Past Medical History:   Diagnosis Date    Abdominal mass, RLQ (right lower quadrant)     last assessed 6/25/2015    Allergic rhinitis     last assessed 8/14/2012    Carpal tunnel syndrome     Chronic left shoulder pain     Chronic pain disorder     left shoulder    Epigastric pain     Fracture of ankle     last assessed 10/21/2013    Frequent headaches     Gall stones     GERD (gastroesophageal reflux disease)     Hiatal hernia     last assessed 8/14/2012    Hypertension     Left supraspinatus tendinitis     Obesity surgery status     Onychomycosis     Osteopenia     Plantar fasciitis     Postgastrectomy malabsorption     RUQ pain     Sacroiliitis (HCC)      Past  "Surgical History:   Procedure Laterality Date    ABDOMINAL SURGERY      CHOLECYSTECTOMY      GALLBLADDER SURGERY      GASTRIC BYPASS      HERNIA REPAIR      CO EGD TRANSORAL BIOPSY SINGLE/MULTIPLE N/A 04/04/2018    Procedure: ESOPHAGOGASTRODUODENOSCOPY (EGD);  Surgeon: James Abdi MD;  Location: [facility];  Service: Bariatrics    ROTATOR CUFF REPAIR      SHOULDER SURGERY       Social History   Social History     Substance and Sexual Activity   Alcohol Use Yes     Social History     Substance and Sexual Activity   Drug Use No     Social History     Tobacco Use   Smoking Status Former    Types: Cigars   Smokeless Tobacco Never       Objective       Current Vitals:   Blood Pressure: 132/84 (02/21/25 1102)  Pulse: 86 (02/21/25 1102)  Temperature: (!) 96.6 °F (35.9 °C) (02/21/25 1102)  Temp Source: Tympanic (02/21/25 1102)  Height: 5' 1.5\" (156.2 cm) (02/21/25 1102)  Weight - Scale: 81 kg (178 lb 8 oz) (02/21/25 1102)  SpO2: 99 % (02/21/25 1102)    Invasive Devices       None                   Physical Exam  Vitals reviewed.   Constitutional:       General: She is not in acute distress.     Appearance: Normal appearance. She is not ill-appearing.   HENT:      Head: Normocephalic and atraumatic.      Nose: Nose normal.      Mouth/Throat:      Mouth: Mucous membranes are moist.      Pharynx: Oropharynx is clear.   Eyes:      Extraocular Movements: Extraocular movements intact.      Conjunctiva/sclera: Conjunctivae normal.   Cardiovascular:      Rate and Rhythm: Normal rate.   Pulmonary:      Effort: Pulmonary effort is normal. No respiratory distress.   Abdominal:      General: There is no distension.      Palpations: Abdomen is soft.      Tenderness: There is no abdominal tenderness. There is no guarding or rebound.      Hernia: A hernia (difficult to appreciate any facial defect at right sided port site) is present.   Musculoskeletal:         General: No deformity. Normal range of motion.      Cervical back: Normal " range of motion and neck supple.   Skin:     General: Skin is warm and dry.      Coloration: Skin is not jaundiced.   Neurological:      General: No focal deficit present.      Mental Status: She is alert and oriented to person, place, and time.   Psychiatric:         Mood and Affect: Mood normal.         Thought Content: Thought content normal.           Pathology, and Other Studies: Results Review Statement: No pertinent imaging studies reviewed.      Assessment/PLAN:    Echo Dalton is a 55 y.o. female status post Darion-En-Y Gastric Bypass with Dr. Juan Francisco Abdi in 2011. Comes to the office today to discuss a hernia.      UGI        EGD  DATE OF SERVICE:  6/05/24     PHYSICIAN(S):  Attending:   James Abdi MD      Fellow:   No Staff Documented         INDICATION:  Bariatric surgery status     POST-OP DIAGNOSIS:  See the impression below.     PREPROCEDURE:  Informed consent was obtained for the procedure, including sedation.  Risks of perforation, hemorrhage, adverse drug reaction and aspiration were discussed. The patient was placed in the left lateral decubitus position.     Patient was explained about the risks and benefits of the procedure. Risks including but not limited to bleeding, infection, and perforation were explained in detail. Also explained about less than 100% sensitivity with the exam and other alternatives.     PROCEDURE: EGD     DETAILS OF PROCEDURE:   Patient was taken to the procedure room where a time out was performed to confirm correct patient and correct procedure. The patient underwent monitored anesthesia care, which was administered by an anesthesia professional. The patient's blood pressure, heart rate, level of consciousness, respirations, oxygen, ECG and ETCO2 were monitored throughout the procedure. The scope was introduced through the mouth and advanced to the second part of the duodenum. Retroflexion was performed in the fundus. The patient experienced no blood loss. The procedure  was not difficult. The patient tolerated the procedure well. There were no apparent adverse events.      ANESTHESIA INFORMATION:  ASA: II  Anesthesia Type: IV Sedation with Anesthesia     MEDICATIONS:  No administrations occurring from 1414 to 1417 on 06/05/24         FINDINGS:  Performed single forceps biopsy in the gastric pouch to rule out H. pylori  Moderate, patchy abnormal mucosa in the gastrojejunal anastomosis. Multiple small marginal ulcers on the small bowel side of the anastomosis  Regular Z-line 35 cm from the incisors  The esophagus and gastric pouch appeared normal.        SPECIMENS:  * No specimens in log *        IMPRESSION:  Performed forceps biopsy in the gastric pouch to rule out H. pylori  Moderate abnormal mucosa in the gastrojejunal anastomosis  The esophagus and gastric pouch appeared normal.      Pathology from EGD         MANOMETRY/pH Study        GASTRIC EMPTYING STUDY      --------------------------------------------------------------------  Discussed with the patient that her symptoms are classic for an incisional hernia, however a palpable fascial defect is not felt on physical exam. Will plan to obtain CT imaging for further characterization. Patient to follow up after this has been completed to discuss the next steps.    Plan:  - CT abd/pel w/ IV/PO contrast  - Follow up after this has resulted            Chivo Mendoza MD  Bariatric Surgery  2/21/2025  11:41 AM

## 2025-02-21 ENCOUNTER — OFFICE VISIT (OUTPATIENT)
Dept: BARIATRICS | Facility: CLINIC | Age: 55
End: 2025-02-21
Payer: COMMERCIAL

## 2025-02-21 VITALS
SYSTOLIC BLOOD PRESSURE: 132 MMHG | WEIGHT: 178.5 LBS | OXYGEN SATURATION: 99 % | HEART RATE: 86 BPM | BODY MASS INDEX: 32.85 KG/M2 | TEMPERATURE: 96.6 F | HEIGHT: 62 IN | DIASTOLIC BLOOD PRESSURE: 84 MMHG

## 2025-02-21 DIAGNOSIS — Z98.84 BARIATRIC SURGERY STATUS: Primary | ICD-10-CM

## 2025-02-21 DIAGNOSIS — K43.2 INCISIONAL HERNIA, WITHOUT OBSTRUCTION OR GANGRENE: ICD-10-CM

## 2025-02-21 PROCEDURE — 99213 OFFICE O/P EST LOW 20 MIN: CPT | Performed by: SURGERY

## 2025-02-21 NOTE — PROGRESS NOTES
Date of surgery: 3/7/2011  Procedure: RNY gastric bypass  Performing surgeon: Dr. Juan Francisco Abdi    Initial Weight - 225.5 lbs.  Current Weight - 178.5 lbs.  Sudheer Weight - 157.5 lbs.  Total Body Weight Loss (EWL) - 47.0 lbs.  EWL% - 50%  TWB% - 21%

## 2025-02-23 ENCOUNTER — HOSPITAL ENCOUNTER (OUTPATIENT)
Dept: CT IMAGING | Facility: HOSPITAL | Age: 55
Discharge: HOME | End: 2025-02-23
Attending: STUDENT IN AN ORGANIZED HEALTH CARE EDUCATION/TRAINING PROGRAM
Payer: COMMERCIAL

## 2025-02-23 DIAGNOSIS — K43.2 INCISIONAL HERNIA, WITHOUT OBSTRUCTION OR GANGRENE: ICD-10-CM

## 2025-02-23 PROCEDURE — 74177 CT ABD & PELVIS W/CONTRAST: CPT

## 2025-02-23 RX ADMIN — IOHEXOL 100 ML: 350 INJECTION, SOLUTION INTRAVENOUS at 15:27

## 2025-02-23 RX ADMIN — IOHEXOL 50 ML: 240 INJECTION, SOLUTION INTRATHECAL; INTRAVASCULAR; INTRAVENOUS; ORAL at 15:27

## 2025-02-27 ENCOUNTER — OFFICE VISIT (OUTPATIENT)
Dept: BARIATRICS | Facility: CLINIC | Age: 55
End: 2025-02-27
Payer: COMMERCIAL

## 2025-02-27 VITALS
HEART RATE: 77 BPM | HEIGHT: 62 IN | TEMPERATURE: 98.7 F | DIASTOLIC BLOOD PRESSURE: 88 MMHG | OXYGEN SATURATION: 98 % | WEIGHT: 178.5 LBS | SYSTOLIC BLOOD PRESSURE: 128 MMHG | BODY MASS INDEX: 32.85 KG/M2

## 2025-02-27 DIAGNOSIS — M54.42 CHRONIC MIDLINE LOW BACK PAIN WITH BILATERAL SCIATICA: ICD-10-CM

## 2025-02-27 DIAGNOSIS — M54.41 CHRONIC MIDLINE LOW BACK PAIN WITH BILATERAL SCIATICA: ICD-10-CM

## 2025-02-27 DIAGNOSIS — G89.29 CHRONIC MIDLINE LOW BACK PAIN WITH BILATERAL SCIATICA: ICD-10-CM

## 2025-02-27 DIAGNOSIS — F10.982 ALCOHOL-INDUCED INSOMNIA (HCC): ICD-10-CM

## 2025-02-27 DIAGNOSIS — R10.13 EPIGASTRIC PAIN: Primary | ICD-10-CM

## 2025-02-27 PROCEDURE — 99213 OFFICE O/P EST LOW 20 MIN: CPT | Performed by: SURGERY

## 2025-02-27 NOTE — TELEPHONE ENCOUNTER
Reason for call:   [x] Refill   [] Prior Auth  [] Other:     Office:   [x] PCP/Provider - [facility]   [] Specialty/Provider -     Medication: clonazePAM (KlonoPIN) 0.5 mg tab     Dose/Frequency: 0.5 mg, Daily at bedtime     Quantity: 30    Pharmacy: [id number]    Does the patient have enough for 3 days?   [] Yes   [x] No - Send as HP to POD

## 2025-02-27 NOTE — TELEPHONE ENCOUNTER
Medication: cyclobenzaprine (FLEXERIL) 10 mg tablet     Dose/Frequency:   Take 1 tablet (10 mg total) by mouth 3 (three) times a day as needed for muscle spasms    Quantity: 30 tablet     Pharmacy: [id number] - KAREN Curtis  [address] [place] [address]     Office:   [x] PCP/Provider - Vanda Bueno, DO   [] Speciality/Provider -     Does the patient have enough for 3 days?   [] Yes   [x] No - Send as HP to POD

## 2025-02-27 NOTE — PROGRESS NOTES
OFFICE VISIT - BARIATRIC SURGERY  Echo Dalton 55 y.o. female MRN: [id number]  Unit/Bed#:  Encounter: [id number]      HPI:  Echo Dalton is a 55 y.o. female status post Darion-En-Y Gastric Bypass with Dr. Juan Francisco Abdi in 2011. Comes to the office today for CT review.    Subjective     Since her last clinic visit, she has continued to experience episodic right sided abdominal pain with an associated bulge, which she is able to manually reduce. This is especially pronounced during exercise or strenuous activity. No obstructive symptoms.    Review of Systems   Constitutional:  Negative for chills and fever.   HENT:  Negative for ear pain and sore throat.    Eyes:  Negative for pain and visual disturbance.   Respiratory:  Negative for cough and shortness of breath.    Cardiovascular:  Negative for chest pain and palpitations.   Gastrointestinal:  Positive for abdominal pain. Negative for constipation, nausea and vomiting.   Genitourinary:  Negative for dysuria and hematuria.   Musculoskeletal:  Negative for arthralgias and back pain.   Skin:  Negative for color change and rash.   Neurological:  Negative for seizures and syncope.   All other systems reviewed and are negative.      Historical Information   Past Medical History:   Diagnosis Date    Abdominal mass, RLQ (right lower quadrant)     last assessed 6/25/2015    Allergic rhinitis     last assessed 8/14/2012    Carpal tunnel syndrome     Chronic left shoulder pain     Chronic pain disorder     left shoulder    Epigastric pain     Fracture of ankle     last assessed 10/21/2013    Frequent headaches     Gall stones     GERD (gastroesophageal reflux disease)     Hiatal hernia     last assessed 8/14/2012    Hypertension     Left supraspinatus tendinitis     Obesity surgery status     Onychomycosis     Osteopenia     Plantar fasciitis     Postgastrectomy malabsorption     RUQ pain     Sacroiliitis (HCC)      Past Surgical History:   Procedure Laterality Date    ABDOMINAL  "SURGERY      CHOLECYSTECTOMY      GALLBLADDER SURGERY      GASTRIC BYPASS      HERNIA REPAIR      MT EGD TRANSORAL BIOPSY SINGLE/MULTIPLE N/A 04/04/2018    Procedure: ESOPHAGOGASTRODUODENOSCOPY (EGD);  Surgeon: James Abdi MD;  Location: [facility];  Service: Bariatrics    ROTATOR CUFF REPAIR      SHOULDER SURGERY       Social History   Social History     Substance and Sexual Activity   Alcohol Use Not Currently     Social History     Substance and Sexual Activity   Drug Use No     Social History     Tobacco Use   Smoking Status Former    Types: Cigars   Smokeless Tobacco Never       Objective       Current Vitals:   Blood Pressure: 128/88 (02/27/25 1532)  Pulse: 77 (02/27/25 1532)  Temperature: 98.7 °F (37.1 °C) (02/27/25 1532)  Temp Source: Tympanic (02/27/25 1532)  Height: 5' 1.5\" (156.2 cm) (02/27/25 1532)  Weight - Scale: 81 kg (178 lb 8 oz) (02/27/25 1532)  SpO2: 98 % (02/27/25 1532)    Invasive Devices       None                   Physical Exam  Vitals reviewed.   Constitutional:       General: She is not in acute distress.     Appearance: Normal appearance. She is not ill-appearing.   HENT:      Head: Normocephalic and atraumatic.      Nose: Nose normal.      Mouth/Throat:      Mouth: Mucous membranes are moist.      Pharynx: Oropharynx is clear.   Eyes:      Extraocular Movements: Extraocular movements intact.      Conjunctiva/sclera: Conjunctivae normal.   Cardiovascular:      Rate and Rhythm: Normal rate.   Pulmonary:      Effort: Pulmonary effort is normal. No respiratory distress.   Abdominal:      General: There is no distension.      Palpations: Abdomen is soft.      Tenderness: There is no abdominal tenderness. There is no guarding or rebound.      Hernia: A hernia (difficult to appreciate any facial defect at right sided port site) is present.   Musculoskeletal:         General: No deformity. Normal range of motion.      Cervical back: Normal range of motion and neck supple.   Skin:     General: " Skin is warm and dry.      Coloration: Skin is not jaundiced.   Neurological:      General: No focal deficit present.      Mental Status: She is alert and oriented to person, place, and time.   Psychiatric:         Mood and Affect: Mood normal.         Thought Content: Thought content normal.           Pathology, and Other Studies: Results Review Statement: No pertinent imaging studies reviewed.      Assessment/PLAN:    Echo Dalton is a 55 y.o. female status post Darion-En-Y Gastric Bypass with Dr. Juan Francisco Abdi in 2011. Comes to the office today to discuss a hernia.      CT abd/pel  FINDINGS:     ABDOMEN     LOWER CHEST: No clinically significant abnormality in the visualized lower chest.     LIVER/BILIARY TREE:  Trace intrapelvic biliary dilation and CBD prominence, likely related to a postcholecystectomy state.     GALLBLADDER: Status post cholecystectomy     SPLEEN: Unremarkable.     PANCREAS: Unremarkable.     ADRENAL GLANDS: Unremarkable.     KIDNEYS/URETERS: Left renal cyst.     STOMACH AND BOWEL:  Status post Darion-en-Y gastric bypass, no evidence of acute complication. No obstruction.     APPENDIX: No findings to suggest appendicitis.     ABDOMINOPELVIC CAVITY: No ascites. No pneumoperitoneum. No lymphadenopathy.     VESSELS: Unremarkable for patient's age.     PELVIS     REPRODUCTIVE ORGANS: Unremarkable for patient's age.     URINARY BLADDER: Unremarkable.     ABDOMINAL WALL/INGUINAL REGIONS: Unremarkable.     BONES: No acute fracture or suspicious osseous lesion. L5-S1 degenerative change.        IMPRESSION:     No acute abnormality identified in the lower chest, abdomen or pelvis.  Status post Darion-en-Y gastric bypass without acute complication. No obstruction.  No hernia.      EGD  DATE OF SERVICE:  6/05/24     PHYSICIAN(S):  Attending:   James Abdi MD      Fellow:   No Staff Documented         INDICATION:  Bariatric surgery status     POST-OP DIAGNOSIS:  See the impression below.      PREPROCEDURE:  Informed consent was obtained for the procedure, including sedation.  Risks of perforation, hemorrhage, adverse drug reaction and aspiration were discussed. The patient was placed in the left lateral decubitus position.     Patient was explained about the risks and benefits of the procedure. Risks including but not limited to bleeding, infection, and perforation were explained in detail. Also explained about less than 100% sensitivity with the exam and other alternatives.     PROCEDURE: EGD     DETAILS OF PROCEDURE:   Patient was taken to the procedure room where a time out was performed to confirm correct patient and correct procedure. The patient underwent monitored anesthesia care, which was administered by an anesthesia professional. The patient's blood pressure, heart rate, level of consciousness, respirations, oxygen, ECG and ETCO2 were monitored throughout the procedure. The scope was introduced through the mouth and advanced to the second part of the duodenum. Retroflexion was performed in the fundus. The patient experienced no blood loss. The procedure was not difficult. The patient tolerated the procedure well. There were no apparent adverse events.      ANESTHESIA INFORMATION:  ASA: II  Anesthesia Type: IV Sedation with Anesthesia     MEDICATIONS:  No administrations occurring from 1414 to 1417 on 06/05/24         FINDINGS:  Performed single forceps biopsy in the gastric pouch to rule out H. pylori  Moderate, patchy abnormal mucosa in the gastrojejunal anastomosis. Multiple small marginal ulcers on the small bowel side of the anastomosis  Regular Z-line 35 cm from the incisors  The esophagus and gastric pouch appeared normal.        SPECIMENS:  * No specimens in log *        IMPRESSION:  Performed forceps biopsy in the gastric pouch to rule out H. pylori  Moderate abnormal mucosa in the gastrojejunal anastomosis  The esophagus and gastric pouch appeared normal.      Pathology from EGD          MANOMETRY/pH Study        GASTRIC EMPTYING STUDY      --------------------------------------------------------------------  Discussed with the patient that her recent CT imaging does not show any hernia to correlate with her symptoms. She does, however, report consistent pain with a bulge at her right abdominal wall for the last several years. Discussed with her that we can perform a diagnostic laparoscopy to rule out any unseen defects such as a spigelian hernia. Patient is in agreement and would like to proceed with this.    Plan:  - Submit paperwork to insurance coordinators for diagnostic laparoscopy             Chivo Mendoza MD  Bariatric Surgery  2/27/2025  11:06 PM

## 2025-03-01 RX ORDER — CLONAZEPAM 0.5 MG/1
0.5 TABLET ORAL
Qty: 30 TABLET | Refills: 0 | Status: SHIPPED | OUTPATIENT
Start: 2025-03-01

## 2025-03-01 RX ORDER — CYCLOBENZAPRINE HCL 10 MG
10 TABLET ORAL 3 TIMES DAILY PRN
Qty: 30 TABLET | Refills: 2 | Status: SHIPPED | OUTPATIENT
Start: 2025-03-01

## 2025-03-03 ENCOUNTER — TELEPHONE (OUTPATIENT)
Dept: BARIATRICS | Facility: CLINIC | Age: 55
End: 2025-03-03

## 2025-03-03 DIAGNOSIS — R13.10 PROBLEMS WITH SWALLOWING AND MASTICATION: Primary | ICD-10-CM

## 2025-03-03 NOTE — TELEPHONE ENCOUNTER
Patient was contacted she can either have PCP do EKG and write a clearance for the procedure Diagnostic Lap or a cardiologist she will contact her PCP she also needs to get the blood work in her chart completed. Once she has had both done she was told to contact the office to discuss a surgery date to be within 30 days of PCP clearance

## 2025-03-03 NOTE — TELEPHONE ENCOUNTER
patient needs a diagnostic lap 87692 dx R10.9, R10.13, Z98.84 No auth required on availity. Patient needs either a PCP or cardiology written clearance with an EKG to be scheduled.

## 2025-03-04 ENCOUNTER — TELEPHONE (OUTPATIENT)
Age: 55
End: 2025-03-04

## 2025-03-04 NOTE — TELEPHONE ENCOUNTER
I called and spoke with the patient she stated they told her she needs to complete labs and EKG prior to them scheduling surgery as this is a same day surgery. 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 Please advise.

## 2025-03-04 NOTE — TELEPHONE ENCOUNTER
Patient called, advised by GI provider to have EKG, lab done before scheduling patient for hernia examination. Advised patient notes are in EPIC for Provider to view. Patient request  a callback with suggestions. Please advise Patient at [phone], if any further question.

## 2025-03-05 ENCOUNTER — HOSPITAL ENCOUNTER (EMERGENCY)
Facility: HOSPITAL | Age: 55
Discharge: HOME | End: 2025-03-05
Attending: EMERGENCY MEDICINE
Payer: COMMERCIAL

## 2025-03-05 VITALS
SYSTOLIC BLOOD PRESSURE: 132 MMHG | WEIGHT: 178.57 LBS | BODY MASS INDEX: 33.19 KG/M2 | DIASTOLIC BLOOD PRESSURE: 70 MMHG | HEART RATE: 94 BPM | OXYGEN SATURATION: 96 % | RESPIRATION RATE: 16 BRPM | TEMPERATURE: 97.6 F

## 2025-03-05 DIAGNOSIS — A08.4 VIRAL GASTROENTERITIS: Primary | ICD-10-CM

## 2025-03-05 LAB
ALBUMIN SERPL BCG-MCNC: 5 G/DL (ref 3.5–5)
ALP SERPL-CCNC: 108 U/L (ref 34–104)
ALT SERPL W P-5'-P-CCNC: 16 U/L (ref 7–52)
ANION GAP SERPL CALCULATED.3IONS-SCNC: 13 MMOL/L (ref 4–13)
AST SERPL W P-5'-P-CCNC: 33 U/L (ref 13–39)
ATRIAL RATE: 100 BPM
BASOPHILS # BLD MANUAL: 0 THOUSAND/UL (ref 0–0.1)
BASOPHILS NFR MAR MANUAL: 0 % (ref 0–1)
BILIRUB SERPL-MCNC: 0.91 MG/DL (ref 0.2–1)
BUN SERPL-MCNC: 18 MG/DL (ref 5–25)
CALCIUM SERPL-MCNC: 9.7 MG/DL (ref 8.4–10.2)
CHLORIDE SERPL-SCNC: 102 MMOL/L (ref 96–108)
CO2 SERPL-SCNC: 20 MMOL/L (ref 21–32)
CREAT SERPL-MCNC: 0.72 MG/DL (ref 0.6–1.3)
EOSINOPHIL # BLD MANUAL: 0 THOUSAND/UL (ref 0–0.4)
EOSINOPHIL NFR BLD MANUAL: 0 % (ref 0–6)
ERYTHROCYTE [DISTWIDTH] IN BLOOD BY AUTOMATED COUNT: 13.6 % (ref 11.6–15.1)
ETHANOL SERPL-MCNC: <10 MG/DL
FLUAV AG UPPER RESP QL IA.RAPID: NEGATIVE
FLUBV AG UPPER RESP QL IA.RAPID: NEGATIVE
GFR SERPL CREATININE-BSD FRML MDRD: 95 ML/MIN/1.73SQ M
GLUCOSE SERPL-MCNC: 163 MG/DL (ref 65–140)
HCT VFR BLD AUTO: 41.3 % (ref 34.8–46.1)
HGB BLD-MCNC: 13.6 G/DL (ref 11.5–15.4)
LYMPHOCYTES # BLD AUTO: 0.43 THOUSAND/UL (ref 0.6–4.47)
LYMPHOCYTES # BLD AUTO: 4 % (ref 14–44)
MAGNESIUM SERPL-MCNC: 1.8 MG/DL (ref 1.9–2.7)
MCH RBC QN AUTO: 32.4 PG (ref 26.8–34.3)
MCHC RBC AUTO-ENTMCNC: 32.9 G/DL (ref 31.4–37.4)
MCV RBC AUTO: 98 FL (ref 82–98)
MONOCYTES # BLD AUTO: 0.32 THOUSAND/UL (ref 0–1.22)
MONOCYTES NFR BLD: 3 % (ref 4–12)
NEUTROPHILS # BLD MANUAL: 9.93 THOUSAND/UL (ref 1.85–7.62)
NEUTS BAND NFR BLD MANUAL: 3 % (ref 0–8)
NEUTS SEG NFR BLD AUTO: 90 % (ref 43–75)
P AXIS: 66 DEGREES
PLATELET # BLD AUTO: 332 THOUSANDS/UL (ref 149–390)
PLATELET BLD QL SMEAR: ADEQUATE
PLATELET CLUMP BLD QL SMEAR: PRESENT
PMV BLD AUTO: 9.4 FL (ref 8.9–12.7)
POTASSIUM SERPL-SCNC: 4.9 MMOL/L (ref 3.5–5.3)
PR INTERVAL: 154 MS
PROT SERPL-MCNC: 8.8 G/DL (ref 6.4–8.4)
QRS AXIS: 64 DEGREES
QRSD INTERVAL: 84 MS
QT INTERVAL: 342 MS
QTC INTERVAL: 441 MS
RBC # BLD AUTO: 4.2 MILLION/UL (ref 3.81–5.12)
RBC MORPH BLD: NORMAL
SARS-COV+SARS-COV-2 AG RESP QL IA.RAPID: NEGATIVE
SODIUM SERPL-SCNC: 135 MMOL/L (ref 135–147)
T WAVE AXIS: 66 DEGREES
VENTRICULAR RATE: 100 BPM
WBC # BLD AUTO: 10.68 THOUSAND/UL (ref 4.31–10.16)

## 2025-03-05 PROCEDURE — 93010 ELECTROCARDIOGRAM REPORT: CPT | Performed by: INTERNAL MEDICINE

## 2025-03-05 PROCEDURE — 87804 INFLUENZA ASSAY W/OPTIC: CPT | Performed by: EMERGENCY MEDICINE

## 2025-03-05 PROCEDURE — 85007 BL SMEAR W/DIFF WBC COUNT: CPT | Performed by: EMERGENCY MEDICINE

## 2025-03-05 PROCEDURE — 82077 ASSAY SPEC XCP UR&BREATH IA: CPT | Performed by: EMERGENCY MEDICINE

## 2025-03-05 PROCEDURE — 87811 SARS-COV-2 COVID19 W/OPTIC: CPT | Performed by: EMERGENCY MEDICINE

## 2025-03-05 PROCEDURE — 93005 ELECTROCARDIOGRAM TRACING: CPT

## 2025-03-05 PROCEDURE — 36415 COLL VENOUS BLD VENIPUNCTURE: CPT | Performed by: EMERGENCY MEDICINE

## 2025-03-05 PROCEDURE — 85027 COMPLETE CBC AUTOMATED: CPT | Performed by: EMERGENCY MEDICINE

## 2025-03-05 PROCEDURE — 99284 EMERGENCY DEPT VISIT MOD MDM: CPT | Performed by: EMERGENCY MEDICINE

## 2025-03-05 PROCEDURE — 83735 ASSAY OF MAGNESIUM: CPT | Performed by: EMERGENCY MEDICINE

## 2025-03-05 PROCEDURE — 96365 THER/PROPH/DIAG IV INF INIT: CPT

## 2025-03-05 PROCEDURE — 80053 COMPREHEN METABOLIC PANEL: CPT | Performed by: EMERGENCY MEDICINE

## 2025-03-05 PROCEDURE — 96375 TX/PRO/DX INJ NEW DRUG ADDON: CPT

## 2025-03-05 PROCEDURE — 96361 HYDRATE IV INFUSION ADD-ON: CPT

## 2025-03-05 PROCEDURE — 99284 EMERGENCY DEPT VISIT MOD MDM: CPT

## 2025-03-05 RX ORDER — ONDANSETRON 2 MG/ML
4 INJECTION INTRAMUSCULAR; INTRAVENOUS ONCE
Status: COMPLETED | OUTPATIENT
Start: 2025-03-05 | End: 2025-03-05

## 2025-03-05 RX ORDER — LANOLIN ALCOHOL/MO/W.PET/CERES
400 CREAM (GRAM) TOPICAL ONCE
Status: COMPLETED | OUTPATIENT
Start: 2025-03-05 | End: 2025-03-05

## 2025-03-05 RX ORDER — ONDANSETRON 4 MG/1
4 TABLET, ORALLY DISINTEGRATING ORAL EVERY 6 HOURS PRN
Qty: 20 TABLET | Refills: 0 | Status: SHIPPED | OUTPATIENT
Start: 2025-03-05

## 2025-03-05 RX ORDER — METOCLOPRAMIDE HYDROCHLORIDE 5 MG/ML
10 INJECTION INTRAMUSCULAR; INTRAVENOUS ONCE
Status: COMPLETED | OUTPATIENT
Start: 2025-03-05 | End: 2025-03-05

## 2025-03-05 RX ORDER — FAMOTIDINE 10 MG/ML
20 INJECTION, SOLUTION INTRAVENOUS ONCE
Status: COMPLETED | OUTPATIENT
Start: 2025-03-05 | End: 2025-03-05

## 2025-03-05 RX ORDER — ACETAMINOPHEN 10 MG/ML
1000 INJECTION, SOLUTION INTRAVENOUS ONCE
Status: COMPLETED | OUTPATIENT
Start: 2025-03-05 | End: 2025-03-05

## 2025-03-05 RX ADMIN — ACETAMINOPHEN 1000 MG: 10 INJECTION INTRAVENOUS at 06:58

## 2025-03-05 RX ADMIN — ONDANSETRON 4 MG: 2 INJECTION, SOLUTION INTRAMUSCULAR; INTRAVENOUS at 06:14

## 2025-03-05 RX ADMIN — FAMOTIDINE 20 MG: 10 INJECTION INTRAVENOUS at 06:16

## 2025-03-05 RX ADMIN — METOCLOPRAMIDE 10 MG: 5 INJECTION, SOLUTION INTRAMUSCULAR; INTRAVENOUS at 06:53

## 2025-03-05 RX ADMIN — Medication 400 MG: at 07:12

## 2025-03-05 RX ADMIN — SODIUM CHLORIDE 1000 ML: 0.9 INJECTION, SOLUTION INTRAVENOUS at 06:22

## 2025-03-05 NOTE — TELEPHONE ENCOUNTER
I called and spoke with the patient and she is trying not to miss anymore, she is scheduled this Friday for her Pre op exam. She had an EKG and labs done this morning while in the ER.

## 2025-03-05 NOTE — TELEPHONE ENCOUNTER
This is the note from the bariatric surgery office:  Ida Ibarra MA   [facility]    3/3/25  3:38 PM  Note     patient needs a diagnostic lap 35601 dx R10.9, R10.13, Z98.84 No auth required on availity. Patient needs either a PCP or cardiology written clearance with an EKG to be scheduled.       Preoperative labs are ready written in the chart to be done.  So somebody needs to do a written clearance and EKG needs to be done so patient needs to be scheduled she can go to the lab to have the lab work done.  Once that is done they will schedule surgery

## 2025-03-05 NOTE — ED PROVIDER NOTES
Time reflects when diagnosis was documented in both MDM as applicable and the Disposition within this note       Time User Action Codes Description Comment    3/5/2025  7:27 AM Isidro Meza Add [A08.4] Viral gastroenteritis           ED Disposition       ED Disposition   Discharge    Condition   Stable    Date/Time   Wed Mar 5, 2025  7:27 AM    Comment   Echo Dalton discharge to home/self care.                   Assessment & Plan       Medical Decision Making  55yoF, pmhx per chart, presenting with viral gastro, sx now improved and tolerating PO. CBC, CMP without patho. Pt initially refractory to zofran, but tolerated PO s/p reglan. Fluids provided and pt tolerated PO. Additional supportive care sent to home. Reviewed all findings both relevant and incidental with the patient at bedside. Pt verbalized understanding of findings, neccesary follow up, return to ED precautions. Pt agreed to review today's findings with their primary care provider. Pt non-toxic appearing upon discharge.       Amount and/or Complexity of Data Reviewed  External Data Reviewed: labs and notes.  Labs: ordered.    Risk  OTC drugs.  Prescription drug management.        ED Course as of 03/05/25 1459   Wed Mar 05, 2025   0647 Refractory to Zofran. Pt vomiting. Reglan ordered.        Medications   sodium chloride 0.9 % bolus 1,000 mL (0 mL Intravenous Stopped 3/5/25 0751)   Famotidine (PF) (PEPCID) injection 20 mg (20 mg Intravenous Given 3/5/25 0616)   ondansetron (ZOFRAN) injection 4 mg (4 mg Intravenous Given 3/5/25 0614)   acetaminophen (Ofirmev) injection 1,000 mg (0 mg Intravenous Stopped 3/5/25 0733)   metoclopramide (REGLAN) injection 10 mg (10 mg Intravenous Given 3/5/25 0653)   magnesium Oxide (MAG-OX) tablet 400 mg (400 mg Oral Given 3/5/25 0712)       ED Risk Strat Scores                            SBIRT 20yo+      Flowsheet Row Most Recent Value   Initial Alcohol Screen: US AUDIT-C     1. How often do you have a drink containing  alcohol? 0 Filed at: 03/05/2025 0556   2. How many drinks containing alcohol do you have on a typical day you are drinking?  0 Filed at: 03/05/2025 0556   3a. Male UNDER 65: How often do you have five or more drinks on one occasion? 0 Filed at: 03/05/2025 0556   3b. FEMALE Any Age, or MALE 65+: How often do you have 4 or more drinks on one occassion? 0 Filed at: 03/05/2025 0556   Audit-C Score 0 Filed at: 03/05/2025 0556   DIMITRY: How many times in the past year have you...    Used an illegal drug or used a prescription medication for non-medical reasons? Never Filed at: 03/05/2025 0556                            History of Present Illness       Chief Complaint   Patient presents with    Vomiting     Vomiting, diarrhea, and abdominal cramping since 0000.       Past Medical History:   Diagnosis Date    Abdominal mass, RLQ (right lower quadrant)     last assessed 6/25/2015    Allergic rhinitis     last assessed 8/14/2012    Carpal tunnel syndrome     Chronic left shoulder pain     Chronic pain disorder     left shoulder    Epigastric pain     Fracture of ankle     last assessed 10/21/2013    Frequent headaches     Gall stones     GERD (gastroesophageal reflux disease)     Hiatal hernia     last assessed 8/14/2012    Hypertension     Left supraspinatus tendinitis     Obesity surgery status     Onychomycosis     Osteopenia     Plantar fasciitis     Postgastrectomy malabsorption     RUQ pain     Sacroiliitis (HCC)       Past Surgical History:   Procedure Laterality Date    ABDOMINAL SURGERY      CHOLECYSTECTOMY      GALLBLADDER SURGERY      GASTRIC BYPASS      HERNIA REPAIR      CT EGD TRANSORAL BIOPSY SINGLE/MULTIPLE N/A 04/04/2018    Procedure: ESOPHAGOGASTRODUODENOSCOPY (EGD);  Surgeon: James Abdi MD;  Location: [facility];  Service: Bariatrics    ROTATOR CUFF REPAIR      SHOULDER SURGERY        Family History   Problem Relation Age of Onset    Stroke Father         syndrome    No Known Problems Maternal Grandmother      No Known Problems Maternal Grandfather     No Known Problems Paternal Grandmother     No Known Problems Paternal Grandfather     Osteoporosis Family       Social History     Tobacco Use    Smoking status: Former     Types: Cigars    Smokeless tobacco: Never   Vaping Use    Vaping status: Never Used   Substance Use Topics    Alcohol use: Not Currently    Drug use: No      E-Cigarette/Vaping    E-Cigarette Use Never User       E-Cigarette/Vaping Substances    Nicotine No     THC No     CBD No       I have reviewed and agree with the history as documented.     55yoF, ,pmhx per chart, presenting to ER with n/v/d x6 hrs. Pt notes intermittent abd cramping. Denies fever, chills, change in bladder. No known sick contacts.       Vomiting  Associated symptoms: diarrhea    Associated symptoms: no abdominal pain, no arthralgias, no chills, no cough, no fever and no sore throat        Review of Systems   Constitutional:  Negative for chills and fever.   HENT:  Negative for ear pain and sore throat.    Eyes:  Negative for pain and visual disturbance.   Respiratory:  Negative for cough and shortness of breath.    Cardiovascular:  Negative for chest pain and palpitations.   Gastrointestinal:  Positive for diarrhea, nausea and vomiting. Negative for abdominal pain.   Genitourinary:  Negative for dysuria and hematuria.   Musculoskeletal:  Negative for arthralgias and back pain.   Skin:  Negative for color change and rash.   Neurological:  Negative for seizures and syncope.   All other systems reviewed and are negative.          Objective       ED Triage Vitals   Temperature Pulse Blood Pressure Respirations SpO2 Patient Position - Orthostatic VS   03/05/25 0555 03/05/25 0555 03/05/25 0555 03/05/25 0555 03/05/25 0555 --   97.6 °F (36.4 °C) (!) 110 153/88 18 99 %       Temp src Heart Rate Source BP Location FiO2 (%) Pain Score    -- -- -- -- 03/05/25 0715        2      Vitals      Date and Time Temp Pulse SpO2 Resp BP Pain Score  FACES Pain Rating User   03/05/25 0730 -- 94 96 % 16 132/70 No Pain -- JDT   03/05/25 0715 -- 91 99 % 16 139/69 2 -- JDT   03/05/25 0626 -- 89 98 % -- -- -- -- AF   03/05/25 0555 97.6 °F (36.4 °C) 110 99 % 18 153/88 -- -- JLZ            Physical Exam  Vitals and nursing note reviewed.   Constitutional:       General: She is not in acute distress.     Appearance: She is well-developed.   HENT:      Head: Normocephalic and atraumatic.   Eyes:      Conjunctiva/sclera: Conjunctivae normal.   Cardiovascular:      Rate and Rhythm: Normal rate and regular rhythm.      Heart sounds: No murmur heard.  Pulmonary:      Effort: Pulmonary effort is normal. No respiratory distress.      Breath sounds: Normal breath sounds.   Abdominal:      Palpations: Abdomen is soft.      Tenderness: There is no abdominal tenderness.   Musculoskeletal:         General: No swelling.      Cervical back: Neck supple.   Skin:     General: Skin is warm and dry.      Capillary Refill: Capillary refill takes less than 2 seconds.   Neurological:      Mental Status: She is alert.   Psychiatric:         Mood and Affect: Mood normal.         Results Reviewed       Procedure Component Value Units Date/Time    RBC Morphology Reflex Test [[id number]] Collected: 03/05/25 0618    Lab Status: Final result Specimen: Blood from Arm, Left Updated: 03/05/25 0802    CBC and differential [[id number]]  (Abnormal) Collected: 03/05/25 0618    Lab Status: Final result Specimen: Blood from Arm, Left Updated: 03/05/25 0727     WBC 10.68 Thousand/uL      RBC 4.20 Million/uL      Hemoglobin 13.6 g/dL      Hematocrit 41.3 %      MCV 98 fL      MCH 32.4 pg      MCHC 32.9 g/dL      RDW 13.6 %      MPV 9.4 fL      Platelets 332 Thousands/uL     Narrative:      This is an appended report.  These results have been appended to a previously verified report.    Manual Differential(PHLEBS Do Not Order) [[id number]]  (Abnormal) Collected: 03/05/25 0618    Lab Status: Final result  Specimen: Blood from Arm, Left Updated: 03/05/25 0727     Segmented % 90 %      Bands % 3 %      Lymphocytes % 4 %      Monocytes % 3 %      Eosinophils % 0 %      Basophils % 0 %      Absolute Neutrophils 9.93 Thousand/uL      Absolute Lymphocytes 0.43 Thousand/uL      Absolute Monocytes 0.32 Thousand/uL      Absolute Eosinophils 0.00 Thousand/uL      Absolute Basophils 0.00 Thousand/uL      Total Counted --     RBC Morphology Normal     Platelet Estimate Adequate     Clumped Platelets Present    Comprehensive metabolic panel [[id number]]  (Abnormal) Collected: 03/05/25 0618    Lab Status: Final result Specimen: Blood from Arm, Left Updated: 03/05/25 0653     Sodium 135 mmol/L      Potassium 4.9 mmol/L      Chloride 102 mmol/L      CO2 20 mmol/L      ANION GAP 13 mmol/L      BUN 18 mg/dL      Creatinine 0.72 mg/dL      Glucose 163 mg/dL      Calcium 9.7 mg/dL      AST 33 U/L      ALT 16 U/L      Alkaline Phosphatase 108 U/L      Total Protein 8.8 g/dL      Albumin 5.0 g/dL      Total Bilirubin 0.91 mg/dL      eGFR 95 ml/min/1.73sq m     Narrative:      National Kidney Disease Foundation guidelines for Chronic Kidney Disease (CKD):     Stage 1 with normal or high GFR (GFR > 90 mL/min/1.73 square meters)    Stage 2 Mild CKD (GFR = 60-89 mL/min/1.73 square meters)    Stage 3A Moderate CKD (GFR = 45-59 mL/min/1.73 square meters)    Stage 3B Moderate CKD (GFR = 30-44 mL/min/1.73 square meters)    Stage 4 Severe CKD (GFR = 15-29 mL/min/1.73 square meters)    Stage 5 End Stage CKD (GFR <15 mL/min/1.73 square meters)  Note: GFR calculation is accurate only with a steady state creatinine    Magnesium [[id number]]  (Abnormal) Collected: 03/05/25 0618    Lab Status: Final result Specimen: Blood from Arm, Left Updated: 03/05/25 0653     Magnesium 1.8 mg/dL     Ethanol [[id number]]  (Normal) Collected: 03/05/25 0618    Lab Status: Final result Specimen: Blood from Arm, Left Updated: 03/05/25 0653     Ethanol Lvl <10 mg/dL      COVID-19/ Inflsoynza A/B Rapid Anitgen(30 min. TAT) [[id number]]  (Normal) Collected: 03/05/25 0618    Lab Status: Final result Specimen: Nares from Nose Updated: 03/05/25 0650     SARS COV Rapid Antigen Negative     Influenza A Rapid Antigen Negative     Influenza B Rapid Antigen Negative    Narrative:      This test has been performed using the [facility] [employer] Viv 2 FLU+SARS Antigen test under the Emergency Use Authorization (EUA). This test has been validated by the  and verified by the performing laboratory. The Viv uses lateral flow immunofluorescent sandwich assay to detect SARS-COV, Influenza A and Influenza B Antigen.     The [employer] Viv 2 SARS Antigen test does not differentiate between SARS-CoV and SARS-CoV-2.     Negative results are presumptive and may be confirmed with a molecular assay, if necessary, for patient management. Negative results do not rule out SARS-CoV-2 or influenza infection and should not be used as the sole basis for treatment or patient management decisions. A negative test result may occur if the level of antigen in a sample is below the limit of detection of this test.     Positive results are indicative of the presence of viral antigens, but do not rule out bacterial infection or co-infection with other viruses.     All test results should be used as an adjunct to clinical observations and other information available to the provider.    FOR PEDIATRIC PATIENTS - copy/paste COVID Guidelines URL to browser: [email or web address]            No orders to display       Procedures    ED Medication and Procedure Management   Prior to Admission Medications   Prescriptions Last Dose Informant Patient Reported? Taking?   Calcium Citrate 1040 MG TABS  Self Yes No   Sig: Take by mouth    Cholecalciferol 50 MCG (2000 UT) CAPS   Yes No   Sig: Take 4 capsules by mouth   Cyanocobalamin (VITAMIN B-12) 1000 MCG SUBL  Self Yes No   Sig: Place  under the tongue   EPINEPHrine (EpiPen 2-Nura) 0.3 mg/0.3 mL SOAJ   No No   Sig: Inject 0.3 mL (0.3 mg total) into a muscle once for 1 dose   LORazepam (Ativan) 0.5 mg tablet   No No   Sig: Take 1 tablet (0.5 mg total) by mouth 60 minutes pre-procedure Can repeat x 1   Multiple Vitamins-Minerals (HAIR/SKIN/NAILS/BIOTIN) TABS  Self Yes No   Sig: Take by mouth   al mag oxide-diphenhydramine-lidocaine viscous (MAGIC MOUTHWASH) 1:1:1 suspension   No No   Sig: Swish and swallow 10 mL every 4 (four) hours as needed for mouth pain or discomfort   albuterol (Proventil HFA) 90 mcg/act inhaler   No No   Sig: Inhale 1 puff 4 (four) times a day prn   amLODIPine (NORVASC) 2.5 mg tablet   No No   Sig: Take 1 tablet (2.5 mg total) by mouth daily   clonazePAM (KlonoPIN) 0.5 mg tablet   No No   Sig: Take 1 tablet (0.5 mg total) by mouth daily at bedtime Insomnia   clotrimazole-betamethasone (LOTRISONE) 1-0.05 % cream   No No   Sig: Apply topically 2 (two) times a day   cyclobenzaprine (FLEXERIL) 10 mg tablet   No No   Sig: Take 1 tablet (10 mg total) by mouth 3 (three) times a day as needed for muscle spasms   ergocalciferol (VITAMIN D2) 50,000 units   No No   Sig: Take 1 capsule (50,000 Units total) by mouth once a week   lidocaine (Lidoderm) 5 %   No No   Sig: Apply 1 patch topically over 12 hours daily Remove & Discard patch within 12 hours or as directed by MD   losartan (COZAAR) 100 MG tablet   No No   Sig: Take 1 tablet (100 mg total) by mouth daily   metaxalone (SKELAXIN) 800 mg tablet   No No   Sig: Take 1 tablet (800 mg total) by mouth 3 (three) times a day   Patient not taking: Reported on 2/27/2025   omeprazole (PriLOSEC) 40 MG capsule   No No   Sig: Take 1 capsule (40 mg total) by mouth daily   ondansetron (ZOFRAN) 4 mg tablet   No No   Sig: Take 1 tablet (4 mg total) by mouth daily as needed for nausea or vomiting   predniSONE 10 mg tablet   No No   Sig: Take PO 6-5-4-3-2-1   Patient not taking: Reported on 2/27/2025    vitamin E, tocopherol, 400 units capsule   Yes No   Sig: Take 400 Units by mouth      Facility-Administered Medications Last Administration Doses Remaining   cyanocobalamin injection 1,000 mcg 12/8/2024  3:55 PM    cyanocobalamin injection 1,000 mcg 11/25/2024  2:12 PM         Discharge Medication List as of 3/5/2025  7:30 AM        START taking these medications    Details   ondansetron (ZOFRAN-ODT) 4 mg disintegrating tablet Take 1 tablet (4 mg total) by mouth every 6 (six) hours as needed for nausea or vomiting, Starting Wed 3/5/2025, Print           CONTINUE these medications which have NOT CHANGED    Details   al mag oxide-diphenhydramine-lidocaine viscous (MAGIC MOUTHWASH) 1:1:1 suspension Swish and swallow 10 mL every 4 (four) hours as needed for mouth pain or discomfort, Starting Mon 9/9/2024, Normal      albuterol (Proventil HFA) 90 mcg/act inhaler Inhale 1 puff 4 (four) times a day prn, Starting Mon 11/25/2024, Normal      amLODIPine (NORVASC) 2.5 mg tablet Take 1 tablet (2.5 mg total) by mouth daily, Starting Wed 5/22/2024, Normal      Calcium Citrate 1040 MG TABS Take by mouth , Historical Med      Cholecalciferol 50 MCG (2000 UT) CAPS Take 4 capsules by mouth, Historical Med      clonazePAM (KlonoPIN) 0.5 mg tablet Take 1 tablet (0.5 mg total) by mouth daily at bedtime Insomnia, Starting Sat 3/1/2025, Normal      clotrimazole-betamethasone (LOTRISONE) 1-0.05 % cream Apply topically 2 (two) times a day, Starting Mon 11/25/2024, Normal      Cyanocobalamin (VITAMIN B-12) 1000 MCG SUBL Place under the tongue, Historical Med      cyclobenzaprine (FLEXERIL) 10 mg tablet Take 1 tablet (10 mg total) by mouth 3 (three) times a day as needed for muscle spasms, Starting Sat 3/1/2025, Normal      EPINEPHrine (EpiPen 2-Nura) 0.3 mg/0.3 mL SOAJ Inject 0.3 mL (0.3 mg total) into a muscle once for 1 dose, Starting Wed 12/9/2020, Normal      ergocalciferol (VITAMIN D2) 50,000 units Take 1 capsule (50,000 Units total)  by mouth once a week, Starting Mon 11/25/2024, Normal      lidocaine (Lidoderm) 5 % Apply 1 patch topically over 12 hours daily Remove & Discard patch within 12 hours or as directed by MD, Starting Tue 11/19/2024, Normal      LORazepam (Ativan) 0.5 mg tablet Take 1 tablet (0.5 mg total) by mouth 60 minutes pre-procedure Can repeat x 1, Starting Tue 1/14/2025, Normal      losartan (COZAAR) 100 MG tablet Take 1 tablet (100 mg total) by mouth daily, Starting Fri 11/8/2024, Normal      metaxalone (SKELAXIN) 800 mg tablet Take 1 tablet (800 mg total) by mouth 3 (three) times a day, Starting Fri 11/8/2024, Normal      Multiple Vitamins-Minerals (HAIR/SKIN/NAILS/BIOTIN) TABS Take by mouth, Historical Med      omeprazole (PriLOSEC) 40 MG capsule Take 1 capsule (40 mg total) by mouth daily, Starting Fri 11/8/2024, Normal      ondansetron (ZOFRAN) 4 mg tablet Take 1 tablet (4 mg total) by mouth daily as needed for nausea or vomiting, Starting Wed 1/8/2025, Normal      predniSONE 10 mg tablet Take PO 6-5-4-3-2-1, Normal      vitamin E, tocopherol, 400 units capsule Take 400 Units by mouth, Historical Med           No discharge procedures on file.  ED SEPSIS DOCUMENTATION   Time reflects when diagnosis was documented in both MDM as applicable and the Disposition within this note       Time User Action Codes Description Comment    3/5/2025  7:27 AM Isidro Meza Add [A08.4] Viral gastroenteritis                  Isidro Mzea DO  03/05/25 5289

## 2025-03-05 NOTE — Clinical Note
Echo Dalton was seen and treated in our emergency department on 3/5/2025.    No restrictions            Diagnosis:     April  .    She may return on this date: 03/07/2025         If you have any questions or concerns, please don't hesitate to call.      Isidro Meza, DO    ______________________________           _______________          _______________  Hospital Representative                              Date                                Time

## 2025-03-06 ENCOUNTER — TELEPHONE (OUTPATIENT)
Dept: BARIATRICS | Facility: CLINIC | Age: 55
End: 2025-03-06

## 2025-03-06 ENCOUNTER — TELEPHONE (OUTPATIENT)
Age: 55
End: 2025-03-06

## 2025-03-06 ENCOUNTER — RA CDI HCC (OUTPATIENT)
Dept: OTHER | Facility: HOSPITAL | Age: 55
End: 2025-03-06

## 2025-03-06 NOTE — TELEPHONE ENCOUNTER
Patient returned call.     Procedure date: not scheduled yet, needs clearance prior to scheduling.     Will make 3/14/25 pre-op.    Will need 6mo follow up in May, will schedule after March 14 appt.

## 2025-03-06 NOTE — TELEPHONE ENCOUNTER
Patient called in inquiring about missed call warm transfer to [facility] [place] [facility] primary clinical successful

## 2025-03-06 NOTE — TELEPHONE ENCOUNTER
Pt contacted office in regards to testing that was needed. Pt states concern for labs and testing done. Pt stated was in hospital and got an EKG in hospital and was wondering if clearance can be accepted from there. Pt was advised that it needed to be from a cardiologist office and was very upset about the expense of this process. Pt was transferred to Ins Coordinator, Ida, for further questions.

## 2025-03-06 NOTE — TELEPHONE ENCOUNTER
Then the visit has to be designated as a  preop clearance not a 6-month follow-up.  And if it is going to be a preop clearance her procedure has to be done then within 30 days of the preop clearance and the labs and EKG

## 2025-03-06 NOTE — TELEPHONE ENCOUNTER
Patient called the office to cancel the appointment she has with the CRNP for a Pre op clearance. Pt is scheduled for a follow up with Dr. Bueno and would like to be cleared during that appointment if possible. Pt was made aware a pre op clearance is typically 2 sperate appointment, however pt would like the provider to review. Please review and advise

## 2025-03-14 ENCOUNTER — CONSULT (OUTPATIENT)
Dept: FAMILY MEDICINE CLINIC | Facility: CLINIC | Age: 55
End: 2025-03-14
Payer: COMMERCIAL

## 2025-03-14 VITALS
HEART RATE: 73 BPM | DIASTOLIC BLOOD PRESSURE: 68 MMHG | RESPIRATION RATE: 16 BRPM | SYSTOLIC BLOOD PRESSURE: 110 MMHG | BODY MASS INDEX: 32.72 KG/M2 | TEMPERATURE: 98.2 F | WEIGHT: 176 LBS | OXYGEN SATURATION: 98 %

## 2025-03-14 DIAGNOSIS — Z98.84 STATUS POST BARIATRIC SURGERY: ICD-10-CM

## 2025-03-14 DIAGNOSIS — I10 PRIMARY HYPERTENSION: ICD-10-CM

## 2025-03-14 DIAGNOSIS — M54.50 ACUTE MIDLINE LOW BACK PAIN WITHOUT SCIATICA: Chronic | ICD-10-CM

## 2025-03-14 DIAGNOSIS — R10.11 RIGHT UPPER QUADRANT PAIN: Primary | ICD-10-CM

## 2025-03-14 PROBLEM — F10.10 ALCOHOL ABUSE: Status: RESOLVED | Noted: 2018-08-20 | Resolved: 2025-03-14

## 2025-03-14 PROBLEM — Z72.0 TOBACCO USE: Status: RESOLVED | Noted: 2018-08-15 | Resolved: 2025-03-14

## 2025-03-14 PROCEDURE — 99214 OFFICE O/P EST MOD 30 MIN: CPT | Performed by: FAMILY MEDICINE

## 2025-03-14 NOTE — TELEPHONE ENCOUNTER
Patient called with questions about surgery. Attempted a warm transfer to the office, but there was no answer. Please call her at [phone]. Thank you.

## 2025-03-14 NOTE — ASSESSMENT & PLAN NOTE
Pressure controlled.  Patient can hold her heart in the morning of surgery.  Okay to take amlodipine.

## 2025-03-14 NOTE — PROGRESS NOTES
Name: Echo Dalton      : 1970      MRN: [id number]  Encounter Provider: Vanda Bueno DO  Encounter Date: 3/14/2025   Encounter department: [facility]  :  Assessment & Plan  Right upper quadrant pain-rule out incisional versus spigelian hernia  Patient is medically cleared for planned procedure       Primary hypertension  Pressure controlled.  Patient can hold her heart in the morning of surgery.  Okay to take amlodipine.       Chronic midline low back pain without sciatica  Follow-up with PCP       Status post bariatric surgery-Darion-En-Y Gastric Bypass with Dr. Juan Francisco Abdi in .  Follow-up with bariatric surgeon         Patient is here for preoperative evaluation.    Pre-op Exam  Surgery: Diagnostic laparoscopy  Anticipated Date of Surgery: In the next 30 days  Surgeon: DR JUAN FRANCISCO Abdi    Patient is status post Darion-en-Y gastric bypass in .  She is currently experiencing right upper quadrant discomfort and a bulge especially with Valsalva maneuver.  Had recent viral gastroenteritis and definitely flared during that episode.  She had CT of the abdomen in February which was evaluated and reviewed by Dr. Juan Francisco Abdi.  Patient's recent lab in the ED are slightly skewed because of acute gastroenteritis.  CBC and chemistries in July were completely normal.  EKG done showed no changes since last done in .  Patient is spearing seeing episodic right sided abdominal pain with an associated bulge, which she is able to manually reduce.    Previous history of bleeding disorders or clots?: No  Previous Anesthesia reaction?: No  Prolonged steroid use in the last 6 months?: No    Assessment of Cardiac Risk:   - Unstable or severe angina or MI in the last 6 weeks or history of stent placement in the last year?: No   - Decompensated heart failure (e.g. New onset heart failure, NYHA  Class IV heart failure, or worsening existing heart failure)?: No  - Significant arrhythmias such as high grade  AV block, symptomatic ventricular arrhythmia, newly recognized ventricular tachycardia, supraventricular tachycardia with resting heart rate >100, or symptomatic bradycardia?: No  - Severe heart valve disease including aortic stenosis or symptomatic mitral stenosis?: No      Pre-operative Risk Factors:  Elevated-risk surgery: No    History of cerebrovascular disease: No    History of ischemic heart disease: No  Pre-operative treatment with insulin: No  Pre-operative creatinine >2 mg/dL: No    History of congestive heart failure: No    Medications of Perioperative Concern:   Calcium channel blockers and ACE inhibitors/ARBs  I     Chief Complaint   Patient presents with   • Pre-op Exam     Diagnostic laparoscopy  with Dr Juan Francisco Abdi   to determine if patient has incisional hernia versus spigelian hernia.  Date to be announced in the next 30 days.                  Review of Systems   Gastrointestinal:         Recent stomach bug   Musculoskeletal:  Positive for back pain (use skelaxin in day and flexeril at PM).        Sciatic to knees- all from gun belt   CONCLUSIONS -treadmill stress test formed at [facility] 2018   1. Negative exercise ECG for ischemia.     2. Normal functional capacity.  METS:    10.1    3. Patient falls into low-risk group (Willard Treadmill Score >= +5). This associates     the patient with an annual CV mortality <= 0.5%.    EKG in ED [facility]  Normal sinus rhythm  Possible Left atrial enlargement  Borderline ECG  When compared with ECG of 12-Aug-2013 15:36,  No significant change was found  Confirmed by Lindsay Hamilton ([id number]) on 3/5/2025 8:34:04 AM  Component      Latest Ref Rng 7/5/2024 3/5/2025   Sodium      135 - 147 mmol/L 140  135    Potassium      3.5 - 5.3 mmol/L 4.2  4.9    Chloride      96 - 108 mmol/L 105  102    Carbon Dioxide      21 - 32 mmol/L 26  20 (L)    ANION GAP      4 - 13 mmol/L 9  13    BUN      5 - 25 mg/dL 16  18    Creatinine      0.60 - 1.30 mg/dL 0.83  0.72    GLUCOSE,  FASTING      65 - 99 mg/dL 96  163   Calcium      8.4 - 10.2 mg/dL 9.2  9.7    AST      13 - 39 U/L 18  33    ALT      7 - 52 U/L 11  16    ALK PHOS      34 - 104 U/L 84  108 (H)    Total Protein      6.4 - 8.4 g/dL 7.0  8.8 (H)    Albumin      3.5 - 5.0 g/dL 3.8  5.0    Total Bilirubin      0.20 - 1.00 mg/dL 0.64  0.91    GFR, Calculated      ml/min/1.73sq m 81  95            Component      Latest Ref Rng 3/5/2025   WBC      4.31 - 10.16 Thousand/uL 10.68 (H)   Patient in ED with GI bug   RBC      3.81 - 5.12 Million/uL 4.20    Hemoglobin      11.5 - 15.4 g/dL 13.6    Hematocrit      34.8 - 46.1 % 41.3    MCV      82 - 98 fL 98    MCH      26.8 - 34.3 pg 32.4    MCHC      31.4 - 37.4 g/dL 32.9    RDW      11.6 - 15.1 % 13.6    MPV      8.9 - 12.7 fL 9.4    Platelet Count      149 - 390 Thousands/uL 332           Objective   /68   Pulse 73   Temp 98.2 °F (36.8 °C) (Skin)   Resp 16   Wt 79.8 kg (176 lb)   SpO2 98%   BMI 32.72 kg/m²      Physical Exam  Constitutional:       General: She is not in acute distress.     Appearance: Normal appearance. She is well-developed.      Comments: 55-year-old female who appears stated age with elevated BMI   HENT:      Head: Normocephalic.      Right Ear: Tympanic membrane normal.      Left Ear: Tympanic membrane normal.      Nose: Nose normal.      Mouth/Throat:      Mouth: Mucous membranes are moist.   Eyes:      Conjunctiva/sclera: Conjunctivae normal.      Pupils: Pupils are equal, round, and reactive to light.   Neck:      Vascular: No carotid bruit.   Cardiovascular:      Rate and Rhythm: Normal rate and regular rhythm.      Pulses: Normal pulses.      Heart sounds: No murmur heard.  Pulmonary:      Effort: Pulmonary effort is normal.      Breath sounds: Normal breath sounds.   Abdominal:      General: Bowel sounds are normal.      Palpations: Abdomen is soft. There is no mass.      Tenderness: There is abdominal tenderness (Right upper quadrant). There is no  rebound.   Musculoskeletal:         General: Normal range of motion.   Skin:     Findings: No rash.   Neurological:      Mental Status: She is alert and oriented to person, place, and time.      Deep Tendon Reflexes: Reflexes are normal and symmetric.   Psychiatric:         Mood and Affect: Mood normal.         Behavior: Behavior normal.         Thought Content: Thought content normal.         Judgment: Judgment normal.

## 2025-03-17 ENCOUNTER — PREP FOR PROCEDURE (OUTPATIENT)
Dept: BARIATRICS | Facility: CLINIC | Age: 55
End: 2025-03-17

## 2025-03-17 DIAGNOSIS — Z98.84 S/P BARIATRIC SURGERY: ICD-10-CM

## 2025-03-17 DIAGNOSIS — R10.13 ABDOMINAL PAIN, EPIGASTRIC: ICD-10-CM

## 2025-03-17 DIAGNOSIS — R10.9 ACUTE ABDOMINAL PAIN: Primary | ICD-10-CM

## 2025-03-20 ENCOUNTER — TELEPHONE (OUTPATIENT)
Dept: BARIATRICS | Facility: CLINIC | Age: 55
End: 2025-03-20

## 2025-03-20 NOTE — TELEPHONE ENCOUNTER
Patient is unable to print out her FMLA paperwork from her My Chart. Is asking if you could please email it to her. Her email is: [email or web address]. Her CB# for any questions is # [phone]. She will pay the fee for the paperwork when she comes in for her appointment on 4/3/25

## 2025-03-20 NOTE — TELEPHONE ENCOUNTER
Patient called in to say she is unable to print the FMLA forms from her [employer], asked if we could please email them to her to the email address that is in her chart.  She also mentioned that she will pay the $15 at her next office visit on 4/3.

## 2025-03-20 NOTE — TELEPHONE ENCOUNTER
Lmom that we could give her a copy when she pays the fee on 4-3-2025 or she could call in and pay over the phone

## 2025-03-20 NOTE — TELEPHONE ENCOUNTER
Patient returning call regarding FMLA.    Warm transferred to office clerical for further assistance.

## 2025-04-02 ENCOUNTER — ANESTHESIA EVENT (OUTPATIENT)
Dept: PERIOP | Facility: HOSPITAL | Age: 55
End: 2025-04-02
Payer: COMMERCIAL

## 2025-04-03 ENCOUNTER — OFFICE VISIT (OUTPATIENT)
Dept: BARIATRICS | Facility: CLINIC | Age: 55
End: 2025-04-03
Payer: COMMERCIAL

## 2025-04-03 VITALS
WEIGHT: 178 LBS | HEART RATE: 80 BPM | HEIGHT: 61 IN | DIASTOLIC BLOOD PRESSURE: 80 MMHG | TEMPERATURE: 97.6 F | SYSTOLIC BLOOD PRESSURE: 120 MMHG | BODY MASS INDEX: 33.61 KG/M2

## 2025-04-03 DIAGNOSIS — R10.31 RIGHT LOWER QUADRANT ABDOMINAL PAIN: Primary | ICD-10-CM

## 2025-04-03 PROCEDURE — 99214 OFFICE O/P EST MOD 30 MIN: CPT | Performed by: SURGERY

## 2025-04-03 NOTE — PROGRESS NOTES
BARIATRIC HISTORY AND PHYSICAL - BARIATRIC SURGERY  Echo Dalton 55 y.o. female MRN: [id number]  Unit/Bed#:  Encounter: [id number]      HPI:  Echo Dalton is a 55 y.o. female who presents to review her preoperative workup and see if she is a good candidate to undergo a diagnostic laparoscopy for abdominal pain s/p RYGB in 2011    Review of Systems    Historical Information   Past Medical History:   Diagnosis Date    Abdominal mass, RLQ (right lower quadrant)     last assessed 6/25/2015    Allergic rhinitis     last assessed 8/14/2012    Carpal tunnel syndrome     Chronic left shoulder pain     Chronic pain disorder     left shoulder    Epigastric pain     Fracture of ankle     last assessed 10/21/2013    Frequent headaches     Gall stones     GERD (gastroesophageal reflux disease)     Hiatal hernia     last assessed 8/14/2012    Hypertension     Left supraspinatus tendinitis     Obesity surgery status     Onychomycosis     Osteopenia     Plantar fasciitis     Postgastrectomy malabsorption     RUQ pain     Sacroiliitis (HCC)      Past Surgical History:   Procedure Laterality Date    ABDOMINAL SURGERY      CHOLECYSTECTOMY      GALLBLADDER SURGERY      GASTRIC BYPASS      HERNIA REPAIR      HI EGD TRANSORAL BIOPSY SINGLE/MULTIPLE N/A 04/04/2018    Procedure: ESOPHAGOGASTRODUODENOSCOPY (EGD);  Surgeon: James Abdi MD;  Location: [facility];  Service: Bariatrics    ROTATOR CUFF REPAIR      SHOULDER SURGERY       Social History   Social History     Substance and Sexual Activity   Alcohol Use Not Currently     Social History     Substance and Sexual Activity   Drug Use No     Social History     Tobacco Use   Smoking Status Former    Types: Cigars   Smokeless Tobacco Never     Family History: Family history non-contributory    Meds/Allergies   all medications and allergies reviewed  Allergies   Allergen Reactions    Crab Extract Allergy Skin Test - Food Allergy      Lips swell    Penicillins     Pollen Extract      "Amoxicillin Rash     Face swells       Objective     Current Vitals:   Blood Pressure: 120/80 (04/03/25 1408)  Pulse: 80 (04/03/25 1408)  Temperature: 97.6 °F (36.4 °C) (04/03/25 1408)  Temp Source: Tympanic (04/03/25 1408)  Height: 5' 1\" (154.9 cm) (04/03/25 1408)  Weight - Scale: 80.7 kg (178 lb) (04/03/25 1408)  bowel movement  [unfilled]    Invasive Devices       None                   Physical Exam    Lab Results: I have personally reviewed pertinent lab results.    Imaging: Results Review Statement: I personally reviewed the following image studies/reports in PACS and discussed pertinent findings with Radiology: CT abdomen/pelvis. My interpretation of the radiology images/reports is: normal.  EKG, Pathology, and Other Studies: Results Review Statement: No pertinent imaging studies reviewed.    Code Status: [unfilled]  Advance Directive and Living Will:      Power of :    POLST:      Assessment/Plan:      The patient presented to review the preoperative workup   Preoperative workup was complete. Results were reviewed with the patient including the blood work results and the endoscopy findings and the biopsy results. We also reviewed the cardiology evaluation.     I believe that the patient will be a good candidate for diagnostic laparoscopy    Consent was signed. Questions were answered and concerns were addressed. Patient wishes to proceed. As per  [facility] guidelines, I had a discussion with the patient regarding his CODE STATUS in the perioperative period and the patient is level 1 or FULL CODE STATUS.  "

## 2025-04-03 NOTE — PROGRESS NOTES
BARIATRIC HISTORY AND PHYSICAL - BARIATRIC SURGERY  Echo Dalton 55 y.o. female MRN: [id number]  Unit/Bed#:  Encounter: [id number]      HPI:  Echo Dalton is a 55 y.o. female who presents to review their preoperative workup and see if they are a good candidate to undergo a bariatric and/or foregut procedure.     Review of Systems    Historical Information   Past Medical History:   Diagnosis Date    Abdominal mass, RLQ (right lower quadrant)     last assessed 6/25/2015    Allergic rhinitis     last assessed 8/14/2012    Carpal tunnel syndrome     Chronic left shoulder pain     Chronic pain disorder     left shoulder    Epigastric pain     Fracture of ankle     last assessed 10/21/2013    Frequent headaches     Gall stones     GERD (gastroesophageal reflux disease)     Hiatal hernia     last assessed 8/14/2012    Hypertension     Left supraspinatus tendinitis     Obesity surgery status     Onychomycosis     Osteopenia     Plantar fasciitis     Postgastrectomy malabsorption     RUQ pain     Sacroiliitis (HCC)      Past Surgical History:   Procedure Laterality Date    ABDOMINAL SURGERY      CHOLECYSTECTOMY      GALLBLADDER SURGERY      GASTRIC BYPASS      HERNIA REPAIR      MA EGD TRANSORAL BIOPSY SINGLE/MULTIPLE N/A 04/04/2018    Procedure: ESOPHAGOGASTRODUODENOSCOPY (EGD);  Surgeon: James Abdi MD;  Location: [facility];  Service: Bariatrics    ROTATOR CUFF REPAIR      SHOULDER SURGERY       Social History   Social History     Substance and Sexual Activity   Alcohol Use Not Currently     Social History     Substance and Sexual Activity   Drug Use No     Social History     Tobacco Use   Smoking Status Former    Types: Cigars   Smokeless Tobacco Never     Family History: {OCHOA ENGLISH FAM HISTORY SMARTLIST:559940429}    Meds/Allergies   {OCHOA IP H&P MEDS:078615412}  Allergies   Allergen Reactions    Crab Extract Allergy Skin Test - Food Allergy      Lips swell    Penicillins     Pollen Extract     Amoxicillin Rash     Face  "robyn       Objective     Current Vitals:   Blood Pressure: 120/80 (04/03/25 1408)  Pulse: 80 (04/03/25 1408)  Temperature: 97.6 °F (36.4 °C) (04/03/25 1408)  Temp Source: Tympanic (04/03/25 1408)  Height: 5' 1\" (154.9 cm) (04/03/25 1408)  Weight - Scale: 80.7 kg (178 lb) (04/03/25 1408)  bowel movement  [unfilled]    Invasive Devices       None                   Physical Exam    Lab Results: { IP GEN RUFUS LABS:05838}  Imaging: {Results Review Statement:53089::\"No pertinent imaging studies reviewed.\"}  EKG, Pathology, and Other Studies: {Results Review Statement:00967::\"No pertinent imaging studies reviewed.\"}    Code Status: [unfilled]  Advance Directive and Living Will:      Power of :    POLST:        Assessment/Plan:    The patient presented to review the preoperative workup and see if bariatric surgery is appropriate and indicated following the extensive preoperative workup and the enrollment in our weight loss program.  Preoperative workup was complete. Results were reviewed with the patient including the blood work results and the endoscopy findings and the biopsy results. We also reviewed the cardiology evaluation.    The patient was determined to be a good candidate for ***.  ----------------------------------------------------------------------  Smoking: ***  Blood thinner use: ***  Home pain medication use and who manages it: ***  CPAP use: *** (If yes, sleep study obtained and reminded pt to bring CPAP to hospital)  History of blood clots: ***  Previous abdominal surgeries: ***  Cardiac clearance obtained: ***   EKG performed: ***  EGD prior to surgery: ***  Screening Labs obtained (CBC, CMP): ***  H. Pylori status: ***  Medication allergies: ***  SLIM consult Post-Op: ***  Reminded patient about 2 week pre-op liquid diet: ***  10% body weight loss pre-operatively met/discussed: ***  Consent signed: ***  Pre-Op ERAS Orders placed: " ***  -----------------------------------------------------------------------  Risks and benefits explained one more time to the patient. Alternatives to surgery and alternative forms of surgery were also explained. Post-surgical commitment and after care programs were explained.  Consent was signed. Questions were answered and concerns were addressed. Patient will need to start the 2 week liquid diet prior to surgery.  Patient wishes to proceed. As per [facility] guidelines, I had a discussion with the patient regarding their CODE STATUS in the perioperative period and the patient is level 1 or FULL CODE STATUS.    Erica Singh PA-C  Bariatric Surgery   4/3/2025  2:25 PM

## 2025-04-04 DIAGNOSIS — Z98.84 BARIATRIC SURGERY STATUS: Primary | ICD-10-CM

## 2025-04-04 RX ORDER — OMEPRAZOLE 40 MG/1
40 CAPSULE, DELAYED RELEASE ORAL DAILY
COMMUNITY

## 2025-04-04 NOTE — PRE-PROCEDURE INSTRUCTIONS
Pre-Surgery Instructions:   Medication Instructions    al mag oxide-diphenhydramine-lidocaine viscous (MAGIC MOUTHWASH) 1:1:1 suspension Uses PRN- DO NOT take day of surgery    albuterol (Proventil HFA) 90 mcg/act inhaler Uses PRN- OK to take day of surgery    amLODIPine (NORVASC) 2.5 mg tablet Take day of surgery.    Calcium Citrate 1040 MG TABS Stop taking 7 days prior to surgery.    Cholecalciferol 50 MCG (2000 UT) CAPS Stop taking 7 days prior to surgery.    clonazePAM (KlonoPIN) 0.5 mg tablet Uses PRN- DO NOT take day of surgery    clotrimazole-betamethasone (LOTRISONE) 1-0.05 % cream Uses PRN- DO NOT take day of surgery    Cyanocobalamin (VITAMIN B-12) 1000 MCG SUBL Stop taking 7 days prior to surgery.    cyclobenzaprine (FLEXERIL) 10 mg tablet Uses PRN- DO NOT take day of surgery    ergocalciferol (VITAMIN D2) 50,000 units Stop taking 7 days prior to surgery.    lidocaine (Lidoderm) 5 % Hold day of surgery.    losartan (COZAAR) 100 MG tablet Hold day of surgery.    metaxalone (SKELAXIN) 800 mg tablet Uses PRN- DO NOT take day of surgery    Multiple Vitamins-Minerals (HAIR/SKIN/NAILS/BIOTIN) TABS Stop taking 7 days prior to surgery.    omeprazole (PriLOSEC) 40 MG capsule Take day of surgery.    ondansetron (ZOFRAN) 4 mg tablet Uses PRN- OK to take day of surgery    vitamin E, tocopherol, 400 units capsule Stop taking 7 days prior to surgery.       Medication instructions for day of surgery reviewed. Please take all instructed medications with only a sip of water.       You will receive a call one business day prior to surgery with an arrival time and hospital directions. If your surgery is scheduled on a Monday, the hospital will be calling you on the Friday prior to your surgery. If you have not heard from anyone by 8pm, please call the hospital supervisor through the hospital  at [phone]. ([place] [phone] or [place] [phone]).    Do not eat or drink anything after midnight the night  before your surgery, including candy, mints, lifesavers, or chewing gum. Do not drink alcohol 24hrs before your surgery. Try not to smoke at least 24hrs before your surgery.       Follow the pre surgery showering instructions as listed in the “My Surgical Experience Booklet” or otherwise provided by your surgeon's office. Do not use a blade to shave the surgical area 1 week before surgery. It is okay to use a clean electric clippers up to 24 hours before surgery. Do not apply any lotions, creams, including makeup, cologne, deodorant, or perfumes after showering on the day of your surgery. Do not use dry shampoo, hair spray, hair gel, or any type of hair products.     No contact lenses, eye make-up, or artificial eyelashes. Remove nail polish, including gel polish, and any artificial, gel, or acrylic nails if possible. Remove all jewelry including rings and body piercing jewelry.     Wear causal clothing that is easy to take on and off. Consider your type of surgery.    Keep any valuables, jewelry, piercings at home. Please bring any specially ordered equipment (sling, braces) if indicated.    Arrange for a responsible person to drive you to and from the hospital on the day of your surgery. Please confirm the visitor policy for the day of your procedure when you receive your phone call with an arrival time.     Call the surgeon's office with any new illnesses, exposures, or additional questions prior to surgery.    Please reference your “My Surgical Experience Booklet” for additional information to prepare for your upcoming surgery.

## 2025-04-08 ENCOUNTER — HOSPITAL ENCOUNTER (OUTPATIENT)
Facility: HOSPITAL | Age: 55
Setting detail: OUTPATIENT SURGERY
Discharge: HOME | End: 2025-04-08
Attending: SURGERY | Admitting: SURGERY
Payer: COMMERCIAL

## 2025-04-08 ENCOUNTER — ANESTHESIA (OUTPATIENT)
Dept: PERIOP | Facility: HOSPITAL | Age: 55
End: 2025-04-08
Payer: COMMERCIAL

## 2025-04-08 VITALS
BODY MASS INDEX: 33.71 KG/M2 | TEMPERATURE: 97.2 F | HEIGHT: 61 IN | DIASTOLIC BLOOD PRESSURE: 68 MMHG | OXYGEN SATURATION: 94 % | RESPIRATION RATE: 16 BRPM | WEIGHT: 178.57 LBS | SYSTOLIC BLOOD PRESSURE: 114 MMHG | HEART RATE: 80 BPM

## 2025-04-08 DIAGNOSIS — Z98.84 BARIATRIC SURGERY STATUS: Primary | ICD-10-CM

## 2025-04-08 DIAGNOSIS — R10.11 RIGHT UPPER QUADRANT ABDOMINAL PAIN: ICD-10-CM

## 2025-04-08 DIAGNOSIS — F10.982 ALCOHOL-INDUCED INSOMNIA (HCC): ICD-10-CM

## 2025-04-08 DIAGNOSIS — R10.13 EPIGASTRIC PAIN: ICD-10-CM

## 2025-04-08 PROCEDURE — 44180 LAP ENTEROLYSIS: CPT | Performed by: STUDENT IN AN ORGANIZED HEALTH CARE EDUCATION/TRAINING PROGRAM

## 2025-04-08 PROCEDURE — 44180 LAP ENTEROLYSIS: CPT | Performed by: SURGERY

## 2025-04-08 PROCEDURE — 99024 POSTOP FOLLOW-UP VISIT: CPT | Performed by: SURGERY

## 2025-04-08 RX ORDER — DIPHENHYDRAMINE HYDROCHLORIDE 50 MG/ML
25 INJECTION, SOLUTION INTRAMUSCULAR; INTRAVENOUS ONCE
Status: COMPLETED | OUTPATIENT
Start: 2025-04-08 | End: 2025-04-08

## 2025-04-08 RX ORDER — SODIUM CHLORIDE, SODIUM LACTATE, POTASSIUM CHLORIDE, CALCIUM CHLORIDE 600; 310; 30; 20 MG/100ML; MG/100ML; MG/100ML; MG/100ML
100 INJECTION, SOLUTION INTRAVENOUS CONTINUOUS
Status: DISCONTINUED | OUTPATIENT
Start: 2025-04-08 | End: 2025-04-08 | Stop reason: HOSPADM

## 2025-04-08 RX ORDER — ONDANSETRON 2 MG/ML
4 INJECTION INTRAMUSCULAR; INTRAVENOUS EVERY 6 HOURS PRN
Status: DISCONTINUED | OUTPATIENT
Start: 2025-04-08 | End: 2025-04-08 | Stop reason: HOSPADM

## 2025-04-08 RX ORDER — ESMOLOL HYDROCHLORIDE 10 MG/ML
INJECTION INTRAVENOUS AS NEEDED
Status: DISCONTINUED | OUTPATIENT
Start: 2025-04-08 | End: 2025-04-08

## 2025-04-08 RX ORDER — ACETAMINOPHEN 10 MG/ML
1000 INJECTION, SOLUTION INTRAVENOUS ONCE
Status: COMPLETED | OUTPATIENT
Start: 2025-04-08 | End: 2025-04-08

## 2025-04-08 RX ORDER — LEVOFLOXACIN 5 MG/ML
750 INJECTION, SOLUTION INTRAVENOUS ONCE
Status: COMPLETED | OUTPATIENT
Start: 2025-04-08 | End: 2025-04-08

## 2025-04-08 RX ORDER — FENTANYL CITRATE 50 UG/ML
INJECTION, SOLUTION INTRAMUSCULAR; INTRAVENOUS AS NEEDED
Status: DISCONTINUED | OUTPATIENT
Start: 2025-04-08 | End: 2025-04-08

## 2025-04-08 RX ORDER — SIMETHICONE 80 MG
80 TABLET,CHEWABLE ORAL 4 TIMES DAILY PRN
Status: DISCONTINUED | OUTPATIENT
Start: 2025-04-08 | End: 2025-04-08 | Stop reason: CLARIF

## 2025-04-08 RX ORDER — OXYCODONE HCL 5 MG/5 ML
5 SOLUTION, ORAL ORAL EVERY 4 HOURS PRN
Refills: 0 | Status: DISCONTINUED | OUTPATIENT
Start: 2025-04-08 | End: 2025-04-08 | Stop reason: HOSPADM

## 2025-04-08 RX ORDER — FAMOTIDINE 10 MG/ML
20 INJECTION, SOLUTION INTRAVENOUS 2 TIMES DAILY
Status: DISCONTINUED | OUTPATIENT
Start: 2025-04-08 | End: 2025-04-08 | Stop reason: HOSPADM

## 2025-04-08 RX ORDER — SODIUM CHLORIDE, SODIUM LACTATE, POTASSIUM CHLORIDE, CALCIUM CHLORIDE 600; 310; 30; 20 MG/100ML; MG/100ML; MG/100ML; MG/100ML
125 INJECTION, SOLUTION INTRAVENOUS CONTINUOUS
Status: DISCONTINUED | OUTPATIENT
Start: 2025-04-08 | End: 2025-04-08 | Stop reason: SDUPTHER

## 2025-04-08 RX ORDER — FENTANYL CITRATE/PF 50 MCG/ML
25 SYRINGE (ML) INJECTION
Status: COMPLETED | OUTPATIENT
Start: 2025-04-08 | End: 2025-04-08

## 2025-04-08 RX ORDER — OXYCODONE HYDROCHLORIDE 5 MG/1
5 TABLET ORAL
Qty: 5 TABLET | Refills: 0 | Status: SHIPPED | OUTPATIENT
Start: 2025-04-08 | End: 2025-04-18

## 2025-04-08 RX ORDER — MIDAZOLAM HYDROCHLORIDE 2 MG/2ML
INJECTION, SOLUTION INTRAMUSCULAR; INTRAVENOUS AS NEEDED
Status: DISCONTINUED | OUTPATIENT
Start: 2025-04-08 | End: 2025-04-08

## 2025-04-08 RX ORDER — DIPHENHYDRAMINE HCL 25 MG
25 TABLET ORAL EVERY 8 HOURS PRN
Status: DISCONTINUED | OUTPATIENT
Start: 2025-04-08 | End: 2025-04-08 | Stop reason: CLARIF

## 2025-04-08 RX ORDER — ROCURONIUM BROMIDE 10 MG/ML
INJECTION, SOLUTION INTRAVENOUS AS NEEDED
Status: DISCONTINUED | OUTPATIENT
Start: 2025-04-08 | End: 2025-04-08

## 2025-04-08 RX ORDER — DIPHENHYDRAMINE HCL 25 MG
25 TABLET ORAL EVERY 8 HOURS PRN
Status: DISCONTINUED | OUTPATIENT
Start: 2025-04-08 | End: 2025-04-08 | Stop reason: HOSPADM

## 2025-04-08 RX ORDER — FAMOTIDINE 10 MG/ML
20 INJECTION, SOLUTION INTRAVENOUS 2 TIMES DAILY
Status: DISCONTINUED | OUTPATIENT
Start: 2025-04-08 | End: 2025-04-08 | Stop reason: CLARIF

## 2025-04-08 RX ORDER — HYDROMORPHONE HCL/PF 1 MG/ML
0.5 SYRINGE (ML) INJECTION
Status: DISCONTINUED | OUTPATIENT
Start: 2025-04-08 | End: 2025-04-08 | Stop reason: HOSPADM

## 2025-04-08 RX ORDER — PROPOFOL 10 MG/ML
INJECTION, EMULSION INTRAVENOUS AS NEEDED
Status: DISCONTINUED | OUTPATIENT
Start: 2025-04-08 | End: 2025-04-08

## 2025-04-08 RX ORDER — ONDANSETRON 2 MG/ML
INJECTION INTRAMUSCULAR; INTRAVENOUS AS NEEDED
Status: DISCONTINUED | OUTPATIENT
Start: 2025-04-08 | End: 2025-04-08

## 2025-04-08 RX ORDER — ONDANSETRON 2 MG/ML
4 INJECTION INTRAMUSCULAR; INTRAVENOUS ONCE AS NEEDED
Status: DISCONTINUED | OUTPATIENT
Start: 2025-04-08 | End: 2025-04-08 | Stop reason: HOSPADM

## 2025-04-08 RX ORDER — OXYCODONE HCL 5 MG/5 ML
10 SOLUTION, ORAL ORAL EVERY 4 HOURS PRN
Refills: 0 | Status: DISCONTINUED | OUTPATIENT
Start: 2025-04-08 | End: 2025-04-08 | Stop reason: HOSPADM

## 2025-04-08 RX ORDER — LIDOCAINE HYDROCHLORIDE 20 MG/ML
INJECTION, SOLUTION EPIDURAL; INFILTRATION; INTRACAUDAL; PERINEURAL AS NEEDED
Status: DISCONTINUED | OUTPATIENT
Start: 2025-04-08 | End: 2025-04-08

## 2025-04-08 RX ORDER — OXYCODONE HCL 5 MG/5 ML
10 SOLUTION, ORAL ORAL EVERY 4 HOURS PRN
Refills: 0 | Status: DISCONTINUED | OUTPATIENT
Start: 2025-04-08 | End: 2025-04-08

## 2025-04-08 RX ORDER — MAGNESIUM HYDROXIDE 1200 MG/15ML
LIQUID ORAL AS NEEDED
Status: DISCONTINUED | OUTPATIENT
Start: 2025-04-08 | End: 2025-04-08 | Stop reason: HOSPADM

## 2025-04-08 RX ORDER — BUPIVACAINE HYDROCHLORIDE 5 MG/ML
INJECTION, SOLUTION EPIDURAL; INTRACAUDAL; PERINEURAL AS NEEDED
Status: DISCONTINUED | OUTPATIENT
Start: 2025-04-08 | End: 2025-04-08 | Stop reason: HOSPADM

## 2025-04-08 RX ORDER — SIMETHICONE 80 MG
80 TABLET,CHEWABLE ORAL 4 TIMES DAILY PRN
Status: DISCONTINUED | OUTPATIENT
Start: 2025-04-08 | End: 2025-04-08 | Stop reason: HOSPADM

## 2025-04-08 RX ORDER — MEPERIDINE HYDROCHLORIDE 25 MG/ML
12.5 INJECTION INTRAMUSCULAR; INTRAVENOUS; SUBCUTANEOUS
Status: DISCONTINUED | OUTPATIENT
Start: 2025-04-08 | End: 2025-04-08 | Stop reason: HOSPADM

## 2025-04-08 RX ORDER — HEPARIN SODIUM 5000 [USP'U]/ML
INJECTION, SOLUTION INTRAVENOUS; SUBCUTANEOUS AS NEEDED
Status: DISCONTINUED | OUTPATIENT
Start: 2025-04-08 | End: 2025-04-08

## 2025-04-08 RX ORDER — DEXAMETHASONE SODIUM PHOSPHATE 10 MG/ML
INJECTION, SOLUTION INTRAMUSCULAR; INTRAVENOUS AS NEEDED
Status: DISCONTINUED | OUTPATIENT
Start: 2025-04-08 | End: 2025-04-08

## 2025-04-08 RX ADMIN — FENTANYL CITRATE 25 MCG: 50 INJECTION INTRAMUSCULAR; INTRAVENOUS at 16:32

## 2025-04-08 RX ADMIN — PROPOFOL 170 MG: 10 INJECTION, EMULSION INTRAVENOUS at 14:25

## 2025-04-08 RX ADMIN — PROPOFOL 80 MCG/KG/MIN: 10 INJECTION, EMULSION INTRAVENOUS at 14:29

## 2025-04-08 RX ADMIN — ROCURONIUM 50 MG: 50 INJECTION, SOLUTION INTRAVENOUS at 14:25

## 2025-04-08 RX ADMIN — ESMOLOL HYDROCHLORIDE 20 MG: 100 INJECTION, SOLUTION INTRAVENOUS at 15:31

## 2025-04-08 RX ADMIN — OXYCODONE HYDROCHLORIDE 5 MG: 5 SOLUTION ORAL at 17:29

## 2025-04-08 RX ADMIN — FENTANYL CITRATE 25 MCG: 50 INJECTION INTRAMUSCULAR; INTRAVENOUS at 16:25

## 2025-04-08 RX ADMIN — DEXAMETHASONE SODIUM PHOSPHATE 10 MG: 10 INJECTION INTRAMUSCULAR; INTRAVENOUS at 14:31

## 2025-04-08 RX ADMIN — ONDANSETRON 4 MG: 2 INJECTION INTRAMUSCULAR; INTRAVENOUS at 15:43

## 2025-04-08 RX ADMIN — SODIUM CHLORIDE, SODIUM LACTATE, POTASSIUM CHLORIDE, AND CALCIUM CHLORIDE: .6; .31; .03; .02 INJECTION, SOLUTION INTRAVENOUS at 15:39

## 2025-04-08 RX ADMIN — SODIUM CHLORIDE, SODIUM LACTATE, POTASSIUM CHLORIDE, AND CALCIUM CHLORIDE 125 ML/HR: .6; .31; .03; .02 INJECTION, SOLUTION INTRAVENOUS at 11:23

## 2025-04-08 RX ADMIN — FENTANYL CITRATE 25 MCG: 50 INJECTION INTRAMUSCULAR; INTRAVENOUS at 16:43

## 2025-04-08 RX ADMIN — FENTANYL CITRATE 50 MCG: 50 INJECTION INTRAMUSCULAR; INTRAVENOUS at 15:21

## 2025-04-08 RX ADMIN — ROCURONIUM 10 MG: 50 INJECTION, SOLUTION INTRAVENOUS at 15:03

## 2025-04-08 RX ADMIN — FENTANYL CITRATE 25 MCG: 50 INJECTION INTRAMUSCULAR; INTRAVENOUS at 16:18

## 2025-04-08 RX ADMIN — FENTANYL CITRATE 25 MCG: 50 INJECTION INTRAMUSCULAR; INTRAVENOUS at 15:03

## 2025-04-08 RX ADMIN — FENTANYL CITRATE 50 MCG: 50 INJECTION INTRAMUSCULAR; INTRAVENOUS at 14:25

## 2025-04-08 RX ADMIN — MIDAZOLAM 2 MG: 1 INJECTION INTRAMUSCULAR; INTRAVENOUS at 14:22

## 2025-04-08 RX ADMIN — SUGAMMADEX 200 MG: 100 INJECTION, SOLUTION INTRAVENOUS at 15:45

## 2025-04-08 RX ADMIN — ACETAMINOPHEN 1000 MG: 10 INJECTION INTRAVENOUS at 14:59

## 2025-04-08 RX ADMIN — LIDOCAINE HYDROCHLORIDE 100 MG: 20 INJECTION, SOLUTION EPIDURAL; INFILTRATION; INTRACAUDAL at 14:25

## 2025-04-08 RX ADMIN — HEPARIN SODIUM 5000 UNITS: 5000 INJECTION INTRAVENOUS; SUBCUTANEOUS at 14:39

## 2025-04-08 RX ADMIN — DIPHENHYDRAMINE HYDROCHLORIDE 25 MG: 50 INJECTION, SOLUTION INTRAMUSCULAR; INTRAVENOUS at 11:08

## 2025-04-08 RX ADMIN — FENTANYL CITRATE 25 MCG: 50 INJECTION INTRAMUSCULAR; INTRAVENOUS at 15:08

## 2025-04-08 RX ADMIN — LEVOFLOXACIN: 5 INJECTION, SOLUTION INTRAVENOUS at 14:33

## 2025-04-08 RX ADMIN — FENTANYL CITRATE 50 MCG: 50 INJECTION INTRAMUSCULAR; INTRAVENOUS at 15:56

## 2025-04-08 RX ADMIN — SUGAMMADEX 200 MG: 100 INJECTION, SOLUTION INTRAVENOUS at 15:39

## 2025-04-08 NOTE — ANESTHESIA POSTPROCEDURE EVALUATION
Post-Op Assessment Note    CV Status:  Stable  Pain Score: 5    Pain management: inadequate (See anesthesia record)       Mental Status:  Alert and awake   Hydration Status:  Stable and euvolemic   PONV Controlled:  None   Airway Patency:  Patent and adequate     Post Op Vitals Reviewed: Yes    No anethesia notable event occurred.    Staff: CRNA           Last Filed PACU Vitals:  Vitals Value Taken Time   Temp 97.4    Pulse 86    /82    Resp 18    SpO2 98

## 2025-04-08 NOTE — ANESTHESIA PREPROCEDURE EVALUATION
Procedure:  LAPAROSCOPY DIAGNOSTIC (Abdomen)    Relevant Problems   CARDIO   (+) Primary hypertension      GI/HEPATIC   (+) GERD (gastroesophageal reflux disease)      MUSCULOSKELETAL   (+) Acute left-sided thoracic back pain   (+) Chronic midline low back pain without sciatica        Physical Exam    Airway    Mallampati score: II         Dental       Cardiovascular  Rhythm: regular, Rate: normal    Pulmonary   Breath sounds clear to auscultation    Other Findings  post-pubertal.      Anesthesia Plan  ASA Score- 2     Anesthesia Type- general with ASA Monitors.         Additional Monitors:     Airway Plan:            Plan Factors-Exercise tolerance (METS): >4 METS.    Chart reviewed. EKG reviewed.  Existing labs reviewed. Patient summary reviewed.    Patient is not a current smoker.      Obstructive sleep apnea risk education given perioperatively.        Induction- intravenous.    Postoperative Plan- Plan for postoperative opioid use.     Perioperative Resuscitation Plan - Level 1 - Full Code.       Informed Consent- Anesthetic plan and risks discussed with patient.        NPO Status:  No vitals data found for the desired time range.

## 2025-04-08 NOTE — DISCHARGE SUMMARY
Discharge Summary - April L Sha 55 y.o. female MRN: [id number]    Unit/Bed#: [facility] Encounter: [id number]      Pre-Operative Diagnosis: Pre-Op Diagnosis Codes:      * Acute abdominal pain [R10.9]     * Abdominal pain, epigastric [R10.13]     * S/P bariatric surgery [Z98.84]    Post-Operative Diagnosis: Post-Op Diagnosis Codes:     * Acute abdominal pain [R10.9]     * Abdominal pain, epigastric [R10.13]     * S/P bariatric surgery [Z98.84]    Procedures Performed:  Procedure(s):  DIAGNOSTIC LAPAROSCOPY WITH ROBOTICS, LYSIS OF ADHESIONS    Surgeon: James Abdi MD    See H & P for full details of admission and Operative Note for full details of operations performed.     Patient tolerated surgery well without complications. Tolerated a clear liquid diet without vomiting. Able to ambulate and voiding independently. Patient was deemed ready for discharge home NOT on lovenox. Patient discharged from PACU.    Patient was seen and examined prior to discharge.      Provisions for Follow-Up Care:  See After Visit Summary/Discharge Instructions for information related to follow-up care and home orders.      Disposition: Home, in stable condition.     Planned Readmission: No    Discharge Medications:  See After Visit Summary/Discharge Instructions for reconciled discharge medications provided to patient and family.      Post Operative instructions: Reviewed with patient and/or family.    Signature:   Anthony New MD  Date: 4/8/2025 Time: 4:10 PM

## 2025-04-08 NOTE — DISCHARGE INSTR - AVS FIRST PAGE
Bariatric/Weight Loss Surgery  Hospital Discharge Instructions  ACTIVITY:  Progress as feels comfortable  You may shower after surgery.  Do not scrub incision sites.  Blot gently with clean towel to dry incisions. (see #4 below)   Use your incentive spirometer 10 times per hour while awake for 1 week after surgery.  Do NOT drive for 48 hours after surgery. No driving 24 hours after taking certain prescription pain medications. Examples of such medication are Percocet, Darvocet, Oxycodone, Tylenol #3, and Tylenol with Codeine.     DIET  You may advance to a regular diet    MEDICATIONS:  The abdominal nerve block will wear off during the first 1-2 days that you are home, and you may become sore (especially over incision site/sites where abdominal wall is sutured). This may create a pulling sensation, especially while moving around, and will fade over time.  Continue to take your Tylenol and your pain medication as instructed.   Bariatric patients: Start vitamins and minerals one week after surgery or when you start stage 3/puree diet.   Anti-acid Medication as per prescription.  Other medications as indicated on the Physician Patient Discharge Instructions form given to you at the time of discharge.  You will need to consult with your Family Doctor in regards to all your prescribed medication, particularly those for blood pressure and diabetes.  As you lose weight, medical conditions may change, requiring an alteration or elimination of the drug dose. Monitor blood pressure closely and call PCP with any concerns.   Lovenox injections daily ONLY if indicated   Females: DO NOT TAKE BIRTH CONTROL(BC) MEDICATIONS, INSERT BC VAGINAL RINGS, OR PLACE IUD OR ANY OTHER BC METHODS UNTIL 31 DAYS FROM DAY OF DISCHARGE FROM HOSPITAL. THIS PLACES YOU AT HIGH RISK FOR A POTENTIALLY LIFE THREATENING BLOOD CLOT. Remember to always use barrier methods for birth control and speak to your GYN about using two forms of birth control to  start 31 days after surgery. It is very important to avoid pregnancy until at least 18-24 months after surgery.     INCISION CARE  You may shower and get incisions wet 2 days after surgery. No soaking tub baths or swimming for 30 days after surgery. Keep abdominal area and incisions clean. Use soap and water to create a good lather and rinse off.  Do not scrub incisions.   If you have a drain, empty the drain as the nurses instructed.    FOLLOW-UP APPOINTMENT should be made for one week after discharge. Call surgeon’s office at [phone] to schedule an appointment.    CALL YOUR DOCTOR FOR:  pain not controlled by pain medications, a temperature greater than 101.5° F, any increase or change in drainage or redness from any incision, any vomiting or inability to keep liquids down, shortness of breath, shoulder pain, or bleeding

## 2025-04-08 NOTE — ANESTHESIA POSTPROCEDURE EVALUATION
Post-Op Assessment Note    CV Status:  Stable    Pain management: adequate       Mental Status:  Alert and awake   Hydration Status:  Euvolemic   PONV Controlled:  Controlled   Airway Patency:  Patent     Post Op Vitals Reviewed: Yes    No anethesia notable event occurred.    Staff: Anesthesiologist           Last Filed PACU Vitals:  Vitals Value Taken Time   Temp 97 °F (36.1 °C) 04/08/25 1625   Pulse 74 04/08/25 1643   /58 04/08/25 1641   Resp 16 04/08/25 1641   SpO2 94 % 04/08/25 1643   Vitals shown include unfiled device data.    Modified Lisa:     Vitals Value Taken Time   Activity 2 04/08/25 1641   Respiration 2 04/08/25 1641   Circulation 2 04/08/25 1641   Consciousness 2 04/08/25 1641   Oxygen Saturation 2 04/08/25 1641     Modified Lisa Score: 10

## 2025-04-09 DIAGNOSIS — F10.982 ALCOHOL-INDUCED INSOMNIA (HCC): ICD-10-CM

## 2025-04-09 RX ORDER — OMEPRAZOLE 20 MG/1
20 CAPSULE, DELAYED RELEASE ORAL DAILY
Qty: 90 CAPSULE | Refills: 1 | Status: SHIPPED | OUTPATIENT
Start: 2025-04-09

## 2025-04-09 NOTE — OP NOTE
OPERATIVE REPORT  PATIENT NAME: Echo Dalton    :  1970  MRN: [id number]  Pt Location: [facility]OM 08    SURGERY DATE: 2025    Surgeons and Role:     * James Abdi MD - Primary     * Anthony New MD - Fellow    Preop Diagnosis:  Acute abdominal pain [R10.9]  Abdominal pain, epigastric [R10.13]  S/P bariatric surgery [Z98.84]    Post-Op Diagnosis Codes:     * Acute abdominal pain [R10.9]     * Abdominal pain, epigastric [R10.13]     * S/P bariatric surgery [Z98.84]    Procedure(s):  DIAGNOSTIC LAPAROSCOPY WITH ROBOTICS. LYSIS OF ADHESIONS    Specimen(s):  * No specimens in log *    Estimated Blood Loss:   Minimal    Drains:  * No LDAs found *    Anesthesia Type:   General    Operative Indications:  Acute abdominal pain [R10.9]  Abdominal pain, epigastric [R10.13]  S/P bariatric surgery [Z98.84]    Operative Findings:  Omental Adhesions (to liver and abd wall)  Castillo's and JJ defects obliterated     Complications:   None    Procedure and Technique:  The patient was brought to the operating room and placed in a supine position. The patient received a dose of IV antibiotics.  SCDs were placed.  The patient was induced under general endotracheal anesthesia. The abdominal wall was prepped and draped under sterile conditions in the usual fashion.     The procedure was started by obtaining access to the abdominal cavity using a Veress needle over palmers point. The abdomen was entered with 8 mm trocar under direct visualization along the left middle quadrant of the abdomen. Two 8 mm trocars were placed in the left upper and left lower quadrants. The robot was docked.    TAP blocks were performed with 20 ml of Exparel mixed with saline and 0.5 % Marcaine was used for a total of 100 ml of local.      We identified omental adhesions to the liver and abdominal wall. The adhesions were located where she reported to have pain and a bulging sensation. We performed lysis of adhesions using both sharp technique  and electrocautery.    We ran the bowel from the LOT to the JJ anastomosis. We checked dean's and JJ defects which were obliterated.    We closed all port sites using 4-0 Monocryl and skin glue.    No qualified resident was available to assist.  The presence of an assistant was necessary for camera holding, traction and counter traction and for help with suturing and stapling in addition to performing the intraop-EGD    Dr. Juan Francisco Abdi was present for the entire procedure.    Patient Disposition:  PACU     This procedure was not performed to treat colon cancer through resection      SIGNATURE: Anthony New MD  DATE: April 9, 2025  TIME: 5:50 AM

## 2025-04-09 NOTE — TELEPHONE ENCOUNTER
Medication: clonazePAM (KlonoPIN) 0.5 mg tablet     Dose/Frequency: Take 1 tablet (0.5 mg total) by mouth daily at bedtime Insomnia     Quantity: 30    Pharmacy:  [id number] - [place], [address]     Office:   [x] PCP/Provider -   [] Speciality/Provider -     Does the patient have enough for 3 days?   [] Yes   [x] No - Send as HP to POD

## 2025-04-10 RX ORDER — CLONAZEPAM 0.5 MG/1
0.5 TABLET ORAL
Qty: 30 TABLET | Refills: 0 | Status: SHIPPED | OUTPATIENT
Start: 2025-04-10

## 2025-04-18 ENCOUNTER — TELEPHONE (OUTPATIENT)
Dept: GASTROENTEROLOGY | Facility: MEDICAL CENTER | Age: 55
End: 2025-04-18

## 2025-04-18 ENCOUNTER — OFFICE VISIT (OUTPATIENT)
Dept: BARIATRICS | Facility: CLINIC | Age: 55
End: 2025-04-18

## 2025-04-18 VITALS
HEIGHT: 62 IN | BODY MASS INDEX: 33.68 KG/M2 | SYSTOLIC BLOOD PRESSURE: 110 MMHG | HEART RATE: 67 BPM | DIASTOLIC BLOOD PRESSURE: 70 MMHG | WEIGHT: 183 LBS | TEMPERATURE: 97.8 F

## 2025-04-18 DIAGNOSIS — Z09 POSTOP CHECK: Primary | ICD-10-CM

## 2025-04-18 PROCEDURE — 99024 POSTOP FOLLOW-UP VISIT: CPT | Performed by: SURGERY

## 2025-04-18 NOTE — TELEPHONE ENCOUNTER
04/18/25  Screened by: Tory Benton    Referring Provider Mick    Pre- Screening:     There is no height or weight on file to calculate BMI.  Has patient been referred for a routine screening Colonoscopy? yes  Is the patient between 45-75 years old? yes      Previous Colonoscopy no   If yes:    Date: N/A    Facility: N/A    Reason: N/A      SCHEDULING STAFF: If the patient is between 45yrs-49yrs, please advise patient to confirm benefits/coverage with their insurance company for a routine screening colonoscopy, some insurance carriers will only cover at 50yrs or older. If the patient is over 75years old, please schedule an office visit.     Does the patient want to see a Gastroenterologist prior to their procedure OR are they having any GI symptoms? yes    Has the patient been hospitalized or had abdominal surgery in the past 6 months? yes    Does the patient use supplemental oxygen? no    Does the patient take Coumadin, Lovenox, Plavix, Elliquis, Xarelto, or other blood thinning medication?     Has the patient had a stroke, cardiac event, or stent placed in the past year?      SCHEDULING STAFF: If patient answers NO to above questions, then schedule procedure. If patient answers YES to above questions, then schedule office appointment.     If patient is between 45yrs - 49yrs, please advise patient that we will have to confirm benefits & coverage with their insurance company for a routine screening colonoscopy.

## 2025-04-18 NOTE — PROGRESS NOTES
Date of surgery:4/8/2025  Procedure:  Diagnostic / Lap  Performing surgeon: Dr. LUISA Abdi     Initial Weight -178 lb   Current Weight -183 lb  Sudheer Weight - now  Total Body Weight Loss (EWL)- 42.5%  EWL% - 46%  TWB % -19%

## 2025-04-18 NOTE — PROGRESS NOTES
POST OP UP VISIT - BARIATRIC SURGERY  Echo Dalton 55 y.o. female MRN: [id number]  Unit/Bed#:  Encounter: [id number]      HPI:  Echo Dalton is a 55 y.o. female status post DIAGNOSTIC LAPAROSCOPY WITH ROBOTICS. LYSIS OF ADHESIONS performed by Dr. Juan Francisco Abdi on 4/8/2025  returning to office for first post op visit since surgery.    Tolerating diet.  Patient reporting that she does have some soreness on that right side.  She has not tried to make movements that had caused the bulge to occur in the past.    Review of Systems   Constitutional:  Negative for chills and fever.   HENT:  Negative for sore throat.    Eyes:  Negative for visual disturbance.   Respiratory:  Negative for cough and shortness of breath.    Gastrointestinal:  Negative for abdominal pain and vomiting.   Musculoskeletal:  Negative for arthralgias.   Skin:  Negative for color change and rash.   Neurological:  Negative for seizures and syncope.   All other systems reviewed and are negative.      Historical Information   Past Medical History:   Diagnosis Date    Abdominal mass, RLQ (right lower quadrant)     last assessed 6/25/2015    Allergic rhinitis     last assessed 8/14/2012    Carpal tunnel syndrome     Chronic left shoulder pain     Chronic pain disorder     left shoulder    Epigastric pain     Fracture of ankle     last assessed 10/21/2013    Frequent headaches     Gall stones     GERD (gastroesophageal reflux disease)     Hiatal hernia     last assessed 8/14/2012    Hypertension     Insomnia     Left supraspinatus tendinitis     Obesity surgery status     Onychomycosis     Osteopenia     Plantar fasciitis     Postgastrectomy malabsorption     RUQ pain     Sacroiliitis (HCC)      Past Surgical History:   Procedure Laterality Date    CHOLECYSTECTOMY      GASTRIC BYPASS      HERNIA REPAIR      [place] EGD TRANSORAL BIOPSY SINGLE/MULTIPLE N/A 04/04/2018    Procedure: ESOPHAGOGASTRODUODENOSCOPY (EGD);  Surgeon: James Abdi MD;  Location: [facility];   "Service: Bariatrics    NH LAPS ABD PRTM&OMENTUM DX W/WO SPEC BR/WA SPX N/A 4/8/2025    Procedure: DIAGNOSTIC LAPAROSCOPY WITH ROBOTICS, LYSIS OF ADHESIONS;  Surgeon: James Abdi MD;  Location: [facility] OR;  Service: Bariatrics    ROTATOR CUFF REPAIR Left     SHOULDER SURGERY Left      Social History   Social History     Substance and Sexual Activity   Alcohol Use Yes    Comment: socially     Social History     Substance and Sexual Activity   Drug Use No     Social History     Tobacco Use   Smoking Status Former    Types: Cigars   Smokeless Tobacco Never     Family History: Family history non-contributory    Meds/Allergies   all medications and allergies reviewed  Allergies   Allergen Reactions    Crab Extract Allergy Skin Test - Food Allergy      Lips swell    Penicillins     Pollen Extract     Amoxicillin Rash     Face swells       Objective       Current Vitals:   Blood Pressure: 110/70 (04/18/25 0943)  Pulse: 67 (04/18/25 0943)  Temperature: 97.8 °F (36.6 °C) (04/18/25 0943)  Temp Source: Tympanic (04/18/25 0943)  Height: 5' 1.5\" (156.2 cm) (04/18/25 0943)  Weight - Scale: 83 kg (183 lb) (04/18/25 0943)      Invasive Devices       None                   Physical Exam  Vitals reviewed.   Constitutional:       Appearance: Normal appearance.   HENT:      Head: Atraumatic.      Nose: Nose normal.   Cardiovascular:      Pulses: Normal pulses.   Pulmonary:      Effort: Pulmonary effort is normal.   Abdominal:      Comments: Surgical site well-healed   Musculoskeletal:         General: Normal range of motion.   Neurological:      General: No focal deficit present.   Psychiatric:         Behavior: Behavior normal.         Assessment/PLAN:    55 y.o. female status post above procedure.  We discussed that she continues to have some soreness on that right side.  Prior to the surgery certain movements will make her have a bulge on the right side that is tender.  She has not attempted to make the movements " postoperatively.    We discussed that we did not see a hernia during the operation.  We also discussed that there was no evidence of internal hernia or issues with her bypass.  Her symptoms could be due to a laxity of the right abdominal wall however this would not be something we would tackle operatively.    Patient states that she has a follow-up appointment with gastroenterology for colonoscopy.    Will have the patient follow-up in 6 months to see how she is doing.        Anthony New MD  Bariatric Surgery   4/18/2025  10:24 AM      .

## 2025-04-18 NOTE — TELEPHONE ENCOUNTER
Spoke with patient to schedule screening colonoscopy  Started OA screening; she explained she has acid reflux and has been experiencing abdominal pain and just had exploratory surgery last Tues for it; requested to see a provider in office before colonoscopy.    Scheduled office visit

## 2025-05-08 ENCOUNTER — APPOINTMENT (OUTPATIENT)
Dept: RADIOLOGY | Age: 55
End: 2025-05-08
Payer: COMMERCIAL

## 2025-05-08 ENCOUNTER — OFFICE VISIT (OUTPATIENT)
Dept: URGENT CARE | Age: 55
End: 2025-05-08
Payer: COMMERCIAL

## 2025-05-08 VITALS
DIASTOLIC BLOOD PRESSURE: 88 MMHG | OXYGEN SATURATION: 97 % | HEART RATE: 74 BPM | SYSTOLIC BLOOD PRESSURE: 168 MMHG | RESPIRATION RATE: 18 BRPM

## 2025-05-08 DIAGNOSIS — M25.572 ACUTE LEFT ANKLE PAIN: ICD-10-CM

## 2025-05-08 DIAGNOSIS — M79.672 LEFT FOOT PAIN: ICD-10-CM

## 2025-05-08 DIAGNOSIS — M25.572 ACUTE LEFT ANKLE PAIN: Primary | ICD-10-CM

## 2025-05-08 PROCEDURE — S9083 URGENT CARE CENTER GLOBAL: HCPCS

## 2025-05-08 PROCEDURE — 73630 X-RAY EXAM OF FOOT: CPT

## 2025-05-08 PROCEDURE — G0383 LEV 4 HOSP TYPE B ED VISIT: HCPCS

## 2025-05-08 PROCEDURE — 73610 X-RAY EXAM OF ANKLE: CPT

## 2025-05-08 NOTE — PROGRESS NOTES
[facility]        NAME: Echo Dalton is a 55 y.o. female  : 1970    MRN: [id number]  DATE: May 8, 2025  TIME: 1:01 PM      Assessment and Plan     Acute left ankle pain [M25.572]  1. Acute left ankle pain  XR ankle 3+ vw left        Wet read of left ankle and left foot show no acute bony abnormalities. Will monitor for final read. Placed in CAM boot with crutches for non-weight bearing until final read. Referral placed to f/u with orthopedics. Aware to f/u with PCP or orthopedics if she requires work restrictions, as she said she has been out of work for a different procedure and is set to return to work on Monday.     Patient Instructions     The final xray result will appear in your mychart; use the cam boot and crutches until you get the xray result, if the final xray shows a fracture, keep the cam boot on until you follow-up with orthopedics, if the xray shows no fracture, you can use the cam boot and crutches as needed; take off every 1-2 hours and can take it off to sleep and shower if there is no fracture.   The final result of the xray will appear in your my chart.  Ice as needed.  Rest.  Elevate.  Acetaminophen or ibuprofen for pain.  PCP follow-up in 3-5 days  Proceed to the ER if symptoms worsen.     Chief Complaint     Chief Complaint   Patient presents with    Ankle Pain     Onset 5/7 PT states she rolled her left ankle on a hill, hard to walk          History of Present Illness     Patient is a 55-year-old female who presents with left ankle pain that started one day ago after rolling her ankle on a hill. Reports pain and swelling to left ankle and foot. Increased pain with weight bearing and moving of the foot and ankle. Denies numbness.         Review of Systems     Review of Systems   Musculoskeletal:  Positive for arthralgias and joint swelling.   Skin:  Negative for color change and wound.   All other systems reviewed and are negative.        Current Medications       Current  Outpatient Medications:     al mag oxide-diphenhydramine-lidocaine viscous (MAGIC MOUTHWASH) 1:1:1 suspension, Swish and swallow 10 mL every 4 (four) hours as needed for mouth pain or discomfort, Disp: 120 mL, Rfl: 1    albuterol (Proventil HFA) 90 mcg/act inhaler, Inhale 1 puff 4 (four) times a day prn, Disp: 6.7 g, Rfl: 5    amLODIPine (NORVASC) 2.5 mg tablet, Take 1 tablet (2.5 mg total) by mouth daily, Disp: 90 tablet, Rfl: 1    Calcium Citrate 1040 MG TABS, Take 1,040 mg by mouth in the morning, Disp: , Rfl:     Cholecalciferol 50 MCG (2000 UT) CAPS, Take 4 capsules by mouth in the morning, Disp: , Rfl:     clonazePAM (KlonoPIN) 0.5 mg tablet, Take 1 tablet (0.5 mg total) by mouth daily at bedtime Insomnia, Disp: 30 tablet, Rfl: 0    clotrimazole-betamethasone (LOTRISONE) 1-0.05 % cream, Apply topically 2 (two) times a day (Patient not taking: Reported on 4/18/2025), Disp: 45 g, Rfl: 1    Cyanocobalamin (VITAMIN B-12) 1000 MCG SUBL, Place 1,000 mcg under the tongue in the morning, Disp: , Rfl:     cyclobenzaprine (FLEXERIL) 10 mg tablet, Take 1 tablet (10 mg total) by mouth 3 (three) times a day as needed for muscle spasms, Disp: 30 tablet, Rfl: 2    EPINEPHrine (EpiPen 2-Nura) 0.3 mg/0.3 mL SOAJ, Inject 0.3 mL (0.3 mg total) into a muscle once for 1 dose, Disp: 0.6 mL, Rfl: 1    ergocalciferol (VITAMIN D2) 50,000 units, Take 1 capsule (50,000 Units total) by mouth once a week, Disp: 12 capsule, Rfl: 1    lidocaine (Lidoderm) 5 %, Apply 1 patch topically over 12 hours daily Remove & Discard patch within 12 hours or as directed by MD, Disp: 30 patch, Rfl: 1    losartan (COZAAR) 100 MG tablet, Take 1 tablet (100 mg total) by mouth daily, Disp: 90 tablet, Rfl: 1    metaxalone (SKELAXIN) 800 mg tablet, Take 1 tablet (800 mg total) by mouth 3 (three) times a day (Patient not taking: Reported on 4/18/2025), Disp: 90 tablet, Rfl: 0    Multiple Vitamins-Minerals (HAIR/SKIN/NAILS/BIOTIN) TABS, Take 1 tablet by mouth in  the morning, Disp: , Rfl:     omeprazole (PriLOSEC) 20 mg delayed release capsule, Take 1 capsule (20 mg total) by mouth daily, Disp: 90 capsule, Rfl: 1    omeprazole (PriLOSEC) 40 MG capsule, Take 40 mg by mouth daily, Disp: , Rfl:     ondansetron (ZOFRAN) 4 mg tablet, Take 1 tablet (4 mg total) by mouth daily as needed for nausea or vomiting (Patient not taking: Reported on 4/18/2025), Disp: 10 tablet, Rfl: 0    vitamin E, tocopherol, 400 units capsule, Take 400 Units by mouth daily, Disp: , Rfl:     Current Facility-Administered Medications:     cyanocobalamin injection 1,000 mcg, 1,000 mcg, Intramuscular, Q30 Days, , 1,000 mcg at 12/08/24 1555    cyanocobalamin injection 1,000 mcg, 1,000 mcg, Intramuscular, Q30 Days, , 1,000 mcg at 11/25/24 1412    Current Allergies     Allergies as of 05/08/2025 - Reviewed 04/18/2025   Allergen Reaction Noted    Crab extract allergy skin test - food allergy  09/16/2016    Penicillins  11/23/2016    Pollen extract  09/16/2016    Amoxicillin Rash 07/02/2012              The following portions of the patient's history were reviewed and updated as appropriate: allergies, current medications, past family history, past medical history, past social history, past surgical history and problem list.     Past Medical History:   Diagnosis Date    Abdominal mass, RLQ (right lower quadrant)     last assessed 6/25/2015    Allergic rhinitis     last assessed 8/14/2012    Carpal tunnel syndrome     Chronic left shoulder pain     Chronic pain disorder     left shoulder    Epigastric pain     Fracture of ankle     last assessed 10/21/2013    Frequent headaches     Gall stones     GERD (gastroesophageal reflux disease)     Hiatal hernia     last assessed 8/14/2012    Hypertension     Insomnia     Left supraspinatus tendinitis     Obesity surgery status     Onychomycosis     Osteopenia     Plantar fasciitis     Postgastrectomy malabsorption     RUQ pain     Sacroiliitis (HCC)        Past Surgical  History:   Procedure Laterality Date    CHOLECYSTECTOMY      GASTRIC BYPASS      HERNIA REPAIR      [place] EGD TRANSORAL BIOPSY SINGLE/MULTIPLE N/A 04/04/2018    Procedure: ESOPHAGOGASTRODUODENOSCOPY (EGD);  Surgeon: James Abdi MD;  Location: [facility];  Service: Bariatrics    [place] LAPS ABD PRTM&OMENTUM DX W/WO SPEC BR/WA SPX N/A 4/8/2025    Procedure: DIAGNOSTIC LAPAROSCOPY WITH ROBOTICS, LYSIS OF ADHESIONS;  Surgeon: James Abdi MD;  Location: [facility];  Service: Bariatrics    ROTATOR CUFF REPAIR Left     SHOULDER SURGERY Left        Family History   Problem Relation Age of Onset    Stroke Father         syndrome    No Known Problems Maternal Grandmother     No Known Problems Maternal Grandfather     No Known Problems Paternal Grandmother     No Known Problems Paternal Grandfather     Osteoporosis Family          Medications have been verified.        Objective     /87   Pulse 74   Resp 18   SpO2 97%   No LMP recorded. Patient is postmenopausal.         Physical Exam     Physical Exam  Vitals and nursing note reviewed.   Constitutional:       General: She is not in acute distress.     Appearance: Normal appearance. She is not ill-appearing, toxic-appearing or diaphoretic.   Musculoskeletal:      Left ankle: Swelling present. No deformity or ecchymosis. Tenderness present over the lateral malleolus. Decreased range of motion. Normal pulse.      Left foot: Normal capillary refill. Swelling, tenderness and bony tenderness present. No crepitus. Normal pulse.   Skin:     General: Skin is warm.      Capillary Refill: Capillary refill takes less than 2 seconds.   Neurological:      Mental Status: She is alert.   Psychiatric:         Mood and Affect: Mood normal.         Behavior: Behavior normal.         Thought Content: Thought content normal.         Judgment: Judgment normal.     Orthopedic injury treatment    Date/Time: 5/8/2025 1:00 PM    Performed by: ALLIE Everett  Authorized by: Alisson  ALLIE Ramires    Patient Location:  [facility] Protocol:  Procedure performed by: syed)  Consent: Verbal consent obtained.  Risks and benefits: risks, benefits and alternatives were discussed  Consent given by: patient  Patient understanding: patient states understanding of the procedure being performed  Radiology Images displayed and confirmed. If images not available, report reviewed: imaging studies available  Patient identity confirmed: verbally with patient    Injury location: left foot and ankle.  Neurovascular status: Neurovascularly intact    Distal perfusion: normal    Neurological function: normal    Range of motion: reduced    Immobilization:  Crutches  Splint type: tall CAM boot, static.  Neurovascular status: Neurovascularly intact    Distal perfusion: normal    Neurological function: normal    Range of motion: unchanged    Patient tolerance:  Patient tolerated the procedure well with no immediate complications

## 2025-05-08 NOTE — PATIENT INSTRUCTIONS
The final xray result will appear in your mychart; use the cam boot and crutches until you get the xray result, if the final xray shows a fracture, keep the cam boot on until you follow-up with orthopedics, if the xray shows no fracture, you can use the cam boot and crutches as needed; take off every 1-2 hours and can take it off to sleep and shower if there is no fracture.   The final result of the xray will appear in your my chart.  Ice as needed.  Rest.  Elevate.  Acetaminophen or ibuprofen for pain.  PCP follow-up in 3-5 days  Proceed to the ER if symptoms worsen.

## 2025-05-08 NOTE — LETTER
May 8, 2025     Patient: Echo Dalton   YOB: 1970   Date of Visit: 5/8/2025       To Whom It May Concern:    It is my medical opinion that Echo Dalton may return to work on 5/13/25         Sincerely,        ALLIE Dyer    CC: No Recipients

## 2025-05-09 ENCOUNTER — TELEPHONE (OUTPATIENT)
Dept: BARIATRICS | Facility: CLINIC | Age: 55
End: 2025-05-09

## 2025-05-12 ENCOUNTER — DOCUMENTATION (OUTPATIENT)
Dept: ADMINISTRATIVE | Facility: OTHER | Age: 55
End: 2025-05-12

## 2025-05-12 NOTE — PROGRESS NOTES
Blood pressure elevated  Appointment department: [facility]  Appointment provider: ALLIE Everett  Blood pressure   05/08/25 1332 168/88   05/08/25 1241 158/87     05/12/25 8:16 AM    Patient was called after the Urgent Care visit Patient agreed to schedule appointment. Pt did has not taken her B/P post UC Pt. is taking her medications today, feeling well now, and agreed to be seen. Appt made for 5-19-25     Thank you.  Olivia Larios  [facility]

## 2025-06-05 ENCOUNTER — TELEPHONE (OUTPATIENT)
Age: 55
End: 2025-06-05

## 2025-06-12 ENCOUNTER — APPOINTMENT (EMERGENCY)
Dept: RADIOLOGY | Facility: HOSPITAL | Age: 55
End: 2025-06-12
Payer: COMMERCIAL

## 2025-06-12 ENCOUNTER — HOSPITAL ENCOUNTER (EMERGENCY)
Facility: HOSPITAL | Age: 55
Discharge: HOME | End: 2025-06-12
Attending: EMERGENCY MEDICINE
Payer: COMMERCIAL

## 2025-06-12 ENCOUNTER — APPOINTMENT (EMERGENCY)
Dept: CT IMAGING | Facility: HOSPITAL | Age: 55
End: 2025-06-12
Payer: COMMERCIAL

## 2025-06-12 VITALS
TEMPERATURE: 97.9 F | BODY MASS INDEX: 32.83 KG/M2 | SYSTOLIC BLOOD PRESSURE: 159 MMHG | WEIGHT: 176.59 LBS | RESPIRATION RATE: 20 BRPM | OXYGEN SATURATION: 100 % | HEART RATE: 58 BPM | DIASTOLIC BLOOD PRESSURE: 82 MMHG

## 2025-06-12 DIAGNOSIS — R07.89 ATYPICAL CHEST PAIN: Primary | ICD-10-CM

## 2025-06-12 DIAGNOSIS — R51.9 HEADACHE: ICD-10-CM

## 2025-06-12 DIAGNOSIS — I10 UNCONTROLLED HYPERTENSION: ICD-10-CM

## 2025-06-12 LAB
2HR DELTA HS TROPONIN: 0 NG/L
ANION GAP SERPL CALCULATED.3IONS-SCNC: 15 MMOL/L (ref 4–13)
ATRIAL RATE: 312 BPM
ATRIAL RATE: 59 BPM
BASOPHILS # BLD AUTO: 0.03 THOUSANDS/ÂΜL (ref 0–0.1)
BASOPHILS NFR BLD AUTO: 0 % (ref 0–1)
BUN SERPL-MCNC: 10 MG/DL (ref 5–25)
CALCIUM SERPL-MCNC: 9.2 MG/DL (ref 8.4–10.2)
CARDIAC TROPONIN I PNL SERPL HS: 5 NG/L (ref ?–50)
CARDIAC TROPONIN I PNL SERPL HS: 5 NG/L (ref ?–50)
CHLORIDE SERPL-SCNC: 106 MMOL/L (ref 96–108)
CO2 SERPL-SCNC: 20 MMOL/L (ref 21–32)
CREAT SERPL-MCNC: 0.8 MG/DL (ref 0.6–1.3)
EOSINOPHIL # BLD AUTO: 0.04 THOUSAND/ÂΜL (ref 0–0.61)
EOSINOPHIL NFR BLD AUTO: 1 % (ref 0–6)
ERYTHROCYTE [DISTWIDTH] IN BLOOD BY AUTOMATED COUNT: 13.5 % (ref 11.6–15.1)
GFR SERPL CREATININE-BSD FRML MDRD: 84 ML/MIN/1.73SQ M
GLUCOSE SERPL-MCNC: 106 MG/DL (ref 65–140)
HCT VFR BLD AUTO: 40.2 % (ref 34.8–46.1)
HGB BLD-MCNC: 13.2 G/DL (ref 11.5–15.4)
IMM GRANULOCYTES # BLD AUTO: 0.01 THOUSAND/UL (ref 0–0.2)
IMM GRANULOCYTES NFR BLD AUTO: 0 % (ref 0–2)
LYMPHOCYTES # BLD AUTO: 2.3 THOUSANDS/ÂΜL (ref 0.6–4.47)
LYMPHOCYTES NFR BLD AUTO: 32 % (ref 14–44)
MCH RBC QN AUTO: 32.3 PG (ref 26.8–34.3)
MCHC RBC AUTO-ENTMCNC: 32.8 G/DL (ref 31.4–37.4)
MCV RBC AUTO: 98 FL (ref 82–98)
MONOCYTES # BLD AUTO: 0.61 THOUSAND/ÂΜL (ref 0.17–1.22)
MONOCYTES NFR BLD AUTO: 9 % (ref 4–12)
NEUTROPHILS # BLD AUTO: 4.22 THOUSANDS/ÂΜL (ref 1.85–7.62)
NEUTS SEG NFR BLD AUTO: 58 % (ref 43–75)
NRBC BLD AUTO-RTO: 0 /100 WBCS
P AXIS: 32 DEGREES
PLATELET # BLD AUTO: 348 THOUSANDS/UL (ref 149–390)
PMV BLD AUTO: 10.2 FL (ref 8.9–12.7)
POTASSIUM SERPL-SCNC: 3.7 MMOL/L (ref 3.5–5.3)
PR INTERVAL: 172 MS
QRS AXIS: 67 DEGREES
QRS AXIS: 70 DEGREES
QRSD INTERVAL: 82 MS
QRSD INTERVAL: 86 MS
QT INTERVAL: 414 MS
QT INTERVAL: 418 MS
QTC INTERVAL: 409 MS
QTC INTERVAL: 410 MS
RBC # BLD AUTO: 4.09 MILLION/UL (ref 3.81–5.12)
SODIUM SERPL-SCNC: 141 MMOL/L (ref 135–147)
T WAVE AXIS: 50 DEGREES
T WAVE AXIS: 52 DEGREES
VENTRICULAR RATE: 58 BPM
VENTRICULAR RATE: 59 BPM
WBC # BLD AUTO: 7.21 THOUSAND/UL (ref 4.31–10.16)

## 2025-06-12 PROCEDURE — 36415 COLL VENOUS BLD VENIPUNCTURE: CPT | Performed by: EMERGENCY MEDICINE

## 2025-06-12 PROCEDURE — 80048 BASIC METABOLIC PNL TOTAL CA: CPT | Performed by: EMERGENCY MEDICINE

## 2025-06-12 PROCEDURE — 96374 THER/PROPH/DIAG INJ IV PUSH: CPT

## 2025-06-12 PROCEDURE — 96365 THER/PROPH/DIAG IV INF INIT: CPT

## 2025-06-12 PROCEDURE — 96375 TX/PRO/DX INJ NEW DRUG ADDON: CPT

## 2025-06-12 PROCEDURE — 84484 ASSAY OF TROPONIN QUANT: CPT | Performed by: EMERGENCY MEDICINE

## 2025-06-12 PROCEDURE — 99285 EMERGENCY DEPT VISIT HI MDM: CPT | Performed by: EMERGENCY MEDICINE

## 2025-06-12 PROCEDURE — 71045 X-RAY EXAM CHEST 1 VIEW: CPT

## 2025-06-12 PROCEDURE — 85025 COMPLETE CBC W/AUTO DIFF WBC: CPT | Performed by: EMERGENCY MEDICINE

## 2025-06-12 PROCEDURE — 93010 ELECTROCARDIOGRAM REPORT: CPT | Performed by: INTERNAL MEDICINE

## 2025-06-12 PROCEDURE — 93005 ELECTROCARDIOGRAM TRACING: CPT

## 2025-06-12 PROCEDURE — 99285 EMERGENCY DEPT VISIT HI MDM: CPT

## 2025-06-12 PROCEDURE — 70450 CT HEAD/BRAIN W/O DYE: CPT

## 2025-06-12 PROCEDURE — 93010 ELECTROCARDIOGRAM REPORT: CPT | Performed by: STUDENT IN AN ORGANIZED HEALTH CARE EDUCATION/TRAINING PROGRAM

## 2025-06-12 PROCEDURE — 96361 HYDRATE IV INFUSION ADD-ON: CPT

## 2025-06-12 RX ORDER — MAGNESIUM SULFATE HEPTAHYDRATE 40 MG/ML
2 INJECTION, SOLUTION INTRAVENOUS ONCE
Status: COMPLETED | OUTPATIENT
Start: 2025-06-12 | End: 2025-06-12

## 2025-06-12 RX ORDER — ACETAMINOPHEN 10 MG/ML
1000 INJECTION, SOLUTION INTRAVENOUS ONCE
Status: COMPLETED | OUTPATIENT
Start: 2025-06-12 | End: 2025-06-12

## 2025-06-12 RX ORDER — METOCLOPRAMIDE HYDROCHLORIDE 5 MG/ML
10 INJECTION INTRAMUSCULAR; INTRAVENOUS ONCE
Status: COMPLETED | OUTPATIENT
Start: 2025-06-12 | End: 2025-06-12

## 2025-06-12 RX ORDER — KETOROLAC TROMETHAMINE 30 MG/ML
30 INJECTION, SOLUTION INTRAMUSCULAR; INTRAVENOUS ONCE
Status: COMPLETED | OUTPATIENT
Start: 2025-06-12 | End: 2025-06-12

## 2025-06-12 RX ADMIN — METOCLOPRAMIDE 10 MG: 5 INJECTION, SOLUTION INTRAMUSCULAR; INTRAVENOUS at 18:48

## 2025-06-12 RX ADMIN — ACETAMINOPHEN 1000 MG: 10 INJECTION INTRAVENOUS at 18:35

## 2025-06-12 RX ADMIN — SODIUM CHLORIDE 1000 ML: 0.9 INJECTION, SOLUTION INTRAVENOUS at 18:36

## 2025-06-12 RX ADMIN — KETOROLAC TROMETHAMINE 30 MG: 30 INJECTION, SOLUTION INTRAMUSCULAR; INTRAVENOUS at 20:37

## 2025-06-12 RX ADMIN — MAGNESIUM SULFATE HEPTAHYDRATE 2 G: 40 INJECTION, SOLUTION INTRAVENOUS at 20:37

## 2025-06-12 NOTE — Clinical Note
Echo Daltno was seen and treated in our emergency department on 6/12/2025.                Diagnosis:     April  may return to work on return date.    She may return on this date: 06/16/2025         If you have any questions or concerns, please don't hesitate to call.      Kathy Calvin, DO    ______________________________           _______________          _______________  Hospital Representative                              Date                                Time

## 2025-06-12 NOTE — Clinical Note
Echo Dalton was seen and treated in our emergency department on 6/12/2025.                Diagnosis:     April  may return to work on return date.    She may return on this date: 06/16/2025         If you have any questions or concerns, please don't hesitate to call.      Kathy Calvin, DO    ______________________________           _______________          _______________  Hospital Representative                              Date                                Time

## 2025-06-12 NOTE — ED PROVIDER NOTES
Time reflects when diagnosis was documented in both MDM as applicable and the Disposition within this note       Time User Action Codes Description Comment    6/12/2025  9:34 PM Kathy Calvin Add [R07.89] Atypical chest pain     6/12/2025  9:34 PM Kathy Calvin Add [R51.9] Headache     6/12/2025  9:34 PM Kathy Calvin Add [I10] Uncontrolled hypertension           ED Disposition       ED Disposition   Discharge    Condition   Stable    Date/Time   Thu Jun 12, 2025  9:34 PM    Comment   April ELVER Dalton discharge to home/self care.                   Assessment & Plan       Medical Decision Making  Headache for the last week and intermittent chest tightness w/ a non-focal exam.  Will get EKG to r/o arrhythmia, ischemic changes.  Will get labs to r/o acute life threatening metabolic abnl, renal dysfunction, electrolyte abnl, cardiac ischemia, significant anemia.  Will get CXR to r/o occult pna, pulm edema, cardiomeg, effusions.   Will get CTH to r/o mass, subacute stroke, bleed.      Will treat HA and re-eval      Problems Addressed:  Atypical chest pain: acute illness or injury that poses a threat to life or bodily functions     Details: No evidence of arrhythmia or ischemia  Headache: acute illness or injury that poses a threat to life or bodily functions     Details: No evidence stroke, mass, bleed  Uncontrolled hypertension: chronic illness or injury with exacerbation, progression, or side effects of treatment     Details: Instructed to f/u w/ PCP    Amount and/or Complexity of Data Reviewed  Labs: ordered.  Radiology: ordered and independent interpretation performed.  ECG/medicine tests: ordered and independent interpretation performed.    Risk  Prescription drug management.        ED Course as of 06/14/25 1206   Thu Jun 12, 2025   2020 Minimal headache improvement.  Will give ketorolac and magnesium and re-eval   2134 Feels better, ready to go home       Medications   sodium chloride 0.9 % bolus 1,000 mL  (0 mL Intravenous Stopped 6/12/25 1936)   metoclopramide (REGLAN) injection 10 mg (10 mg Intravenous Given 6/12/25 1848)   acetaminophen (Ofirmev) injection 1,000 mg (0 mg Intravenous Stopped 6/12/25 1850)   ketorolac (TORADOL) injection 30 mg (30 mg Intravenous Given 6/12/25 2037)   magnesium sulfate 2 g/50 mL IVPB (premix) 2 g (0 g Intravenous Stopped 6/12/25 2107)       ED Risk Strat Scores   HEART Risk Score      Flowsheet Row Most Recent Value   Heart Score Risk Calculator    History 0 Filed at: 06/12/2025 2126   ECG 0 Filed at: 06/12/2025 2126   Age 1 Filed at: 06/12/2025 2126   Risk Factors 1 Filed at: 06/12/2025 2126   Troponin 0 Filed at: 06/12/2025 2126   HEART Score 2 Filed at: 06/12/2025 2126          HEART Risk Score      Flowsheet Row Most Recent Value   Heart Score Risk Calculator    History 0 Filed at: 06/12/2025 2126   ECG 0 Filed at: 06/12/2025 2126   Age 1 Filed at: 06/12/2025 2126   Risk Factors 1 Filed at: 06/12/2025 2126   Troponin 0 Filed at: 06/12/2025 2126   HEART Score 2 Filed at: 06/12/2025 2126                      No data recorded        SBIRT 22yo+      Flowsheet Row Most Recent Value   Initial Alcohol Screen: US AUDIT-C     1. How often do you have a drink containing alcohol? 0 Filed at: 06/12/2025 1730   2. How many drinks containing alcohol do you have on a typical day you are drinking?  0 Filed at: 06/12/2025 1730   3a. Male UNDER 65: How often do you have five or more drinks on one occasion? 0 Filed at: 06/12/2025 1730   3b. FEMALE Any Age, or MALE 65+: How often do you have 4 or more drinks on one occassion? 0 Filed at: 06/12/2025 1730   Audit-C Score 0 Filed at: 06/12/2025 1730   DIMITRY: How many times in the past year have you...    Used an illegal drug or used a prescription medication for non-medical reasons? Never Filed at: 06/12/2025 8855                            History of Present Illness       Chief Complaint   Patient presents with    Chest Pain     Pt reports squeezing  chest pain starting in AM. Pt hx of htn and took medications today. Pt reports headache and tingling in legs.        Past Medical History[1]   Past Surgical History[2]   Family History[3]   Social History[4]   E-Cigarette/Vaping    E-Cigarette Use Never User       E-Cigarette/Vaping Substances    Nicotine No     THC No     CBD No     Flavoring No     Other No     Unknown No       I have reviewed and agree with the history as documented.     Patient presents with:  Chest Pain: Pt reports squeezing chest pain starting in AM. Pt hx of htn and took medications today. Pt reports headache and tingling in legs.     55y F here with multiple complaints.  Pt reports recurrent frontal headache all week - has been taking Excedrin w/ intermittent relief.  No hx of significant HA like this in the past.  No reported vision/speech changes, no focal numbness or weakness.  Additionally, pt reports intermittent chest pressure / tightness that is sometimes relieved w/ burping/belching.  Has had intermittent episodes of nausea, nbnb emesis, but tolerating po today despite continued nausea.  No reported sob/champion, no cough/congestion.  No LE edema.    Pt does note a lot of stress this past week - came in today after a  for her grandmother.     Hx of HTN, taking meds as prescribed. No hx of recurrent HA in the past.  Hx of GERD.  Hx of prior hiatal hernia that was repaired w/ her gastric bypass in .      History provided by:  Patient   used: No    Chest Pain      Review of Systems   Cardiovascular:  Positive for chest pain.   All other systems reviewed and are negative.          Objective       ED Triage Vitals [25 1728]   Temperature Pulse Blood Pressure Respirations SpO2 Patient Position - Orthostatic VS   97.9 °F (36.6 °C) 63 (S) (!) 209/92 16 100 % Lying      Temp Source Heart Rate Source BP Location FiO2 (%) Pain Score    Oral Monitor Left arm -- --      Vitals      Date and Time Temp Pulse SpO2  Resp BP Pain Score FACES Pain Rating User   06/12/25 1930 -- 58 100 % 20 159/82 -- -- SS   06/12/25 1900 -- 64 99 % 19 186/103 -- -- SS   06/12/25 1830 -- 58 100 % 17 188/92 -- -- CO   06/12/25 1800 -- 58 100 % 19 160/85 -- -- SS   06/12/25 1747 -- 63 100 % 18 204/101 -- -- SS   06/12/25 1728 97.9 °F (36.6 °C) 63 100 % 16  209/92 -- -- SR            Physical Exam  Vitals and nursing note reviewed.   Constitutional:       General: She is not in acute distress.     Appearance: Normal appearance. She is not ill-appearing, toxic-appearing or diaphoretic.     Eyes:      Extraocular Movements: Extraocular movements intact.      Conjunctiva/sclera: Conjunctivae normal.      Pupils: Pupils are equal, round, and reactive to light.       Cardiovascular:      Rate and Rhythm: Normal rate.   Pulmonary:      Effort: Pulmonary effort is normal.   Abdominal:      Palpations: Abdomen is soft.     Musculoskeletal:      Cervical back: Normal range of motion.     Skin:     General: Skin is warm.     Neurological:      General: No focal deficit present.      Mental Status: She is alert and oriented to person, place, and time.      GCS: GCS eye subscore is 4. GCS verbal subscore is 5. GCS motor subscore is 6.      Cranial Nerves: Cranial nerves 2-12 are intact. No cranial nerve deficit.      Sensory: No sensory deficit.      Motor: Motor function is intact. No weakness.      Coordination: Coordination is intact. Coordination normal.     Psychiatric:         Mood and Affect: Mood normal.         Results Reviewed       Procedure Component Value Units Date/Time    HS Troponin I 2hr [[id number]]  (Normal) Collected: 06/12/25 2019    Lab Status: Final result Specimen: Blood from Hand, Left Updated: 06/12/25 2102     hs TnI 2hr 5 ng/L      Delta 2hr hsTnI 0 ng/L     Basic metabolic panel [[id number]]  (Abnormal) Collected: 06/12/25 1758    Lab Status: Final result Specimen: Blood from Arm, Right Updated: 06/12/25 1847     Sodium 141 mmol/L       Potassium 3.7 mmol/L      Chloride 106 mmol/L      CO2 20 mmol/L      ANION GAP 15 mmol/L      BUN 10 mg/dL      Creatinine 0.80 mg/dL      Glucose 106 mg/dL      Calcium 9.2 mg/dL      eGFR 84 ml/min/1.73sq m     Narrative:      National Kid[employer] Foundation guidelines for Chronic Kidney Disease (CKD):     Stage 1 with normal or high GFR (GFR > 90 mL/min/1.73 square meters)    Stage 2 Mild CKD (GFR = 60-89 mL/min/1.73 square meters)    Stage 3A Moderate CKD (GFR = 45-59 mL/min/1.73 square meters)    Stage 3B Moderate CKD (GFR = 30-44 mL/min/1.73 square meters)    Stage 4 Severe CKD (GFR = 15-29 mL/min/1.73 square meters)    Stage 5 End Stage CKD (GFR <15 mL/min/1.73 square meters)  Note: GFR calculation is accurate only with a steady state creatinine    HS Troponin 0hr (reflex protocol) [[id number]]  (Normal) Collected: 06/12/25 1758    Lab Status: Final result Specimen: Blood from Arm, Right Updated: 06/12/25 1830     hs TnI 0hr 5 ng/L     CBC and differential [[id number]] Collected: 06/12/25 1758    Lab Status: Final result Specimen: Blood from Arm, Right Updated: 06/12/25 1806     WBC 7.21 Thousand/uL      RBC 4.09 Million/uL      Hemoglobin 13.2 g/dL      Hematocrit 40.2 %      MCV 98 fL      MCH 32.3 pg      MCHC 32.8 g/dL      RDW 13.5 %      MPV 10.2 fL      Platelets 348 Thousands/uL      nRBC 0 /100 WBCs      Segmented % 58 %      Immature Grans % 0 %      Lymphocytes % 32 %      Monocytes % 9 %      Eosinophils Relative 1 %      Basophils Relative 0 %      Absolute Neutrophils 4.22 Thousands/µL      Absolute Immature Grans 0.01 Thousand/uL      Absolute Lymphocytes 2.30 Thousands/µL      Absolute Monocytes 0.61 Thousand/µL      Eosinophils Absolute 0.04 Thousand/µL      Basophils Absolute 0.03 Thousands/µL             XR chest 1 view portable   ED Interpretation by Kathy Calvin DO (06/12 2044)   Xray reviewed and independently interpreted by me: no acute findings.  Formal reading per radiology         Final Interpretation by Corie Xiong MD (06/12 2055)      No acute cardiopulmonary disease.            Workstation performed: [id number]         CT head without contrast   ED Interpretation by Kathy Calvin DO (06/12 2016)   See below      Final Interpretation by Robb Gray MD (06/12 1952)      No acute intracranial abnormality.                  Workstation performed: [id number]             ECG 12 Lead Documentation Only    Date/Time: 6/12/2025 5:30 PM    Performed by: Kathy Calvin DO  Authorized by: Kathy Calvin DO    Indications / Diagnosis:  Pain  ECG reviewed by me, the ED Provider: yes    Patient location:  ED  Previous ECG:     Previous ECG:  Compared to current    Similarity:  No change  Interpretation:     Interpretation: non-specific    Rate:     ECG rate:  59    ECG rate assessment: normal    Rhythm:     Rhythm: sinus rhythm    ST segments:     ST segments:  Normal  T waves:     T waves: normal        ED Medication and Procedure Management   Prior to Admission Medications   Prescriptions Last Dose Informant Patient Reported? Taking?   Calcium Citrate 1040 MG TABS  Self Yes No   Sig: Take 1,040 mg by mouth in the morning   EPINEPHrine (EpiPen 2-Nura) 0.3 mg/0.3 mL SOAJ   No No   Sig: Inject 0.3 mL (0.3 mg total) into a muscle once for 1 dose   Multiple Vitamins-Minerals (HAIR/SKIN/NAILS/BIOTIN) TABS  Self Yes No   Sig: Take 1 tablet by mouth in the morning   albuterol (Proventil HFA) 90 mcg/act inhaler   No No   Sig: Inhale 1 puff 4 (four) times a day prn   amLODIPine (NORVASC) 2.5 mg tablet   No No   Sig: Take 1 tablet (2.5 mg total) by mouth daily   clonazePAM (KlonoPIN) 0.5 mg tablet   No No   Sig: Take 1 tablet (0.5 mg total) by mouth daily at bedtime Insomnia   cyclobenzaprine (FLEXERIL) 10 mg tablet   No No   Sig: Take 1 tablet (10 mg total) by mouth 3 (three) times a day as needed for muscle spasms   ergocalciferol (VITAMIN D2) 50,000 units   No No   Sig: Take 1  capsule (50,000 Units total) by mouth once a week   lidocaine (Lidoderm) 5 %   No No   Sig: Apply 1 patch topically over 12 hours daily Remove & Discard patch within 12 hours or as directed by MD   losartan (COZAAR) 100 MG tablet   No No   Sig: Take 1 tablet (100 mg total) by mouth daily   omeprazole (PriLOSEC) 20 mg delayed release capsule   No No   Sig: Take 1 capsule (20 mg total) by mouth daily   vitamin E, tocopherol, 400 units capsule   Yes No   Sig: Take 400 Units by mouth daily      Facility-Administered Medications: None     Discharge Medication List as of 6/12/2025  9:39 PM        CONTINUE these medications which have NOT CHANGED    Details   albuterol (Proventil HFA) 90 mcg/act inhaler Inhale 1 puff 4 (four) times a day prn, Starting Mon 11/25/2024, Normal      amLODIPine (NORVASC) 2.5 mg tablet Take 1 tablet (2.5 mg total) by mouth daily, Starting Wed 5/22/2024, Normal      Calcium Citrate 1040 MG TABS Take 1,040 mg by mouth in the morning, Historical Med      clonazePAM (KlonoPIN) 0.5 mg tablet Take 1 tablet (0.5 mg total) by mouth daily at bedtime Insomnia, Starting Thu 4/10/2025, Normal      cyclobenzaprine (FLEXERIL) 10 mg tablet Take 1 tablet (10 mg total) by mouth 3 (three) times a day as needed for muscle spasms, Starting Sat 3/1/2025, Normal      EPINEPHrine (EpiPen 2-Nura) 0.3 mg/0.3 mL SOAJ Inject 0.3 mL (0.3 mg total) into a muscle once for 1 dose, Starting Wed 12/9/2020, Normal      ergocalciferol (VITAMIN D2) 50,000 units Take 1 capsule (50,000 Units total) by mouth once a week, Starting Mon 11/25/2024, Normal      lidocaine (Lidoderm) 5 % Apply 1 patch topically over 12 hours daily Remove & Discard patch within 12 hours or as directed by MD, Starting Tue 11/19/2024, Normal      losartan (COZAAR) 100 MG tablet Take 1 tablet (100 mg total) by mouth daily, Starting Fri 11/8/2024, Normal      Multiple Vitamins-Minerals (HAIR/SKIN/NAILS/BIOTIN) TABS Take 1 tablet by mouth in the morning,  Historical Med      omeprazole (PriLOSEC) 20 mg delayed release capsule Take 1 capsule (20 mg total) by mouth daily, Starting Wed 4/9/2025, Normal      vitamin E, tocopherol, 400 units capsule Take 400 Units by mouth daily, Historical Med           No discharge procedures on file.  ED SEPSIS DOCUMENTATION   Time reflects when diagnosis was documented in both MDM as applicable and the Disposition within this note       Time User Action Codes Description Comment    6/12/2025  9:34 PM Katyh Calvin [R07.89] Atypical chest pain     6/12/2025  9:34 PM Kathy Calvin [R51.9] Headache     6/12/2025  9:34 PM Kathy Calvin [I10] Uncontrolled hypertension                      [1]   Past Medical History:  Diagnosis Date    Abdominal mass, RLQ (right lower quadrant)     last assessed 6/25/2015    Allergic rhinitis     last assessed 8/14/2012    Carpal tunnel syndrome     Chronic left shoulder pain     Chronic pain disorder     left shoulder    Epigastric pain     Fracture of ankle     last assessed 10/21/2013    Frequent headaches     Gall stones     GERD (gastroesophageal reflux disease)     Hiatal hernia     last assessed 8/14/2012    Hypertension     Insomnia     Left supraspinatus tendinitis     Obesity surgery status     Onychomycosis     Osteopenia     Plantar fasciitis     Postgastrectomy malabsorption     RUQ pain     Sacroiliitis (HCC)    [2]   Past Surgical History:  Procedure Laterality Date    CHOLECYSTECTOMY      GASTRIC BYPASS      HERNIA REPAIR      NM EGD TRANSORAL BIOPSY SINGLE/MULTIPLE N/A 04/04/2018    Procedure: ESOPHAGOGASTRODUODENOSCOPY (EGD);  Surgeon: James Abdi MD;  Location: [facility];  Service: Bariatrics    NM LAPS ABD PRTM&OMENTUM DX W/WO SPEC BR/WA SPX N/A 4/8/2025    Procedure: DIAGNOSTIC LAPAROSCOPY WITH ROBOTICS, LYSIS OF ADHESIONS;  Surgeon: James Abdi MD;  Location: [facility];  Service: Bariatrics    ROTATOR CUFF REPAIR Left     SHOULDER SURGERY Left    [3]    Family History  Problem Relation Name Age of Onset    Stroke Father          syndrome    No Known Problems Maternal Grandmother      No Known Problems Maternal Grandfather      No Known Problems Paternal Grandmother      No Known Problems Paternal Grandfather      Osteoporosis Family     [4]   Social History  Tobacco Use    Smoking status: Former     Types: Cigars    Smokeless tobacco: Never   Vaping Use    Vaping status: Never Used   Substance Use Topics    Alcohol use: Yes     Comment: socially    Drug use: No        Kathy Calvni DO  06/14/25 [id number]

## 2025-06-13 ENCOUNTER — TELEPHONE (OUTPATIENT)
Age: 55
End: 2025-06-13

## 2025-06-13 NOTE — TELEPHONE ENCOUNTER
Patient called, state she was seen in Emergency Room for elevated blood pressure, states advised to follow up w Primary Care Provider and request adjustment to blood pressure medication. Confirmed Providers availability. Patient question sif Primary Care Provider can review Er notes, and adjust medication. Please advise patient if any further questions.

## 2025-06-13 NOTE — DISCHARGE INSTRUCTIONS
Start keeping a blood pressure log:    Take your blood pressure twice a day - once in the morning and once at night.    Make sure you have been sitting for at least 10 minutes and are calm and relaxed prior to taking the blood pressure.  Document the readings and keep the log to discuss with your primary care doctor.  If your blood pressure is elevated DO NOT keep rechecking it because it will likely just continue to increase because of your concern/worry.  You DO NOT need to come to the Emergency Department for an elevated blood pressure unless you are having signs of a stroke (numbness/weakness to one side of the body, trouble speaking, trouble with balance) or heart attack (severe chest pain, trouble breathing).

## 2025-06-13 NOTE — TELEPHONE ENCOUNTER
Called and spoke with patient to schedule appointment to follow up from hospital.patient states that she will call back to schedule the appointment.

## 2025-06-16 ENCOUNTER — OFFICE VISIT (OUTPATIENT)
Dept: FAMILY MEDICINE CLINIC | Facility: CLINIC | Age: 55
End: 2025-06-16
Payer: COMMERCIAL

## 2025-06-16 VITALS
HEIGHT: 61 IN | WEIGHT: 176 LBS | OXYGEN SATURATION: 99 % | BODY MASS INDEX: 33.23 KG/M2 | SYSTOLIC BLOOD PRESSURE: 132 MMHG | DIASTOLIC BLOOD PRESSURE: 84 MMHG | TEMPERATURE: 96.3 F | HEART RATE: 60 BPM

## 2025-06-16 DIAGNOSIS — R79.9 ABNORMAL BLOOD CHEMISTRY: ICD-10-CM

## 2025-06-16 DIAGNOSIS — K21.9 GASTROESOPHAGEAL REFLUX DISEASE, UNSPECIFIED WHETHER ESOPHAGITIS PRESENT: ICD-10-CM

## 2025-06-16 DIAGNOSIS — F10.982 ALCOHOL-INDUCED INSOMNIA (HCC): ICD-10-CM

## 2025-06-16 DIAGNOSIS — Z78.0 POST-MENOPAUSE: ICD-10-CM

## 2025-06-16 DIAGNOSIS — Z91.018 HISTORY OF FOOD ALLERGY: ICD-10-CM

## 2025-06-16 DIAGNOSIS — I10 PRIMARY HYPERTENSION: Primary | ICD-10-CM

## 2025-06-16 PROCEDURE — 99214 OFFICE O/P EST MOD 30 MIN: CPT | Performed by: FAMILY MEDICINE

## 2025-06-16 RX ORDER — LOSARTAN POTASSIUM 100 MG/1
100 TABLET ORAL DAILY
Qty: 90 TABLET | Refills: 1 | Status: SHIPPED | OUTPATIENT
Start: 2025-06-16

## 2025-06-16 RX ORDER — AMLODIPINE BESYLATE 2.5 MG/1
2.5 TABLET ORAL DAILY
Qty: 90 TABLET | Refills: 1 | Status: SHIPPED | OUTPATIENT
Start: 2025-06-16

## 2025-06-16 RX ORDER — CYCLOBENZAPRINE HCL 10 MG
10 TABLET ORAL 3 TIMES DAILY PRN
Qty: 30 TABLET | Refills: 2 | Status: CANCELLED | OUTPATIENT
Start: 2025-06-16

## 2025-06-16 RX ORDER — CLONAZEPAM 0.5 MG/1
0.5 TABLET ORAL
Qty: 30 TABLET | Refills: 0 | Status: SHIPPED | OUTPATIENT
Start: 2025-06-16

## 2025-06-16 RX ORDER — EPINEPHRINE 0.3 MG/.3ML
0.3 INJECTION SUBCUTANEOUS ONCE
Qty: 0.6 ML | Refills: 1 | Status: SHIPPED | OUTPATIENT
Start: 2025-06-16 | End: 2025-06-16

## 2025-06-18 NOTE — ASSESSMENT & PLAN NOTE
Chronic asymptomatic status post recent ER visit blood pressure was elevated today in the office blood pressure 132/84 patient to continue amlodipine 2.5 mg once a day to continue Joel 100 mg once a day low-salt diet reviewed  Orders:  •  amLODIPine (NORVASC) 2.5 mg tablet; Take 1 tablet (2.5 mg total) by mouth daily  •  losartan (COZAAR) 100 MG tablet; Take 1 tablet (100 mg total) by mouth daily

## 2025-06-18 NOTE — PROGRESS NOTES
Name: Echo Dalton      : 1970      MRN: [id number]  Encounter Provider: Lucila Cuadra MD  Encounter Date: 2025   Encounter department: [facility]  :  Assessment & Plan  Primary hypertension  Chronic asymptomatic status post recent ER visit blood pressure was elevated today in the office blood pressure 132/84 patient to continue amlodipine 2.5 mg once a day to continue Joel 100 mg once a day low-salt diet reviewed  Orders:  •  amLODIPine (NORVASC) 2.5 mg tablet; Take 1 tablet (2.5 mg total) by mouth daily  •  losartan (COZAAR) 100 MG tablet; Take 1 tablet (100 mg total) by mouth daily    Alcohol-induced insomnia (HCC)  Chronic fair control patient uses Klonopin on as needed basis proper use and possible side effect review  Orders:  •  clonazePAM (KlonoPIN) 0.5 mg tablet; Take 1 tablet (0.5 mg total) by mouth daily at bedtime Insomnia  •  Basic metabolic panel; Future    History of food allergy  Patient has history of food allergy EpiPen  recommend to have 1 handy at home  Orders:  •  EPINEPHrine (EpiPen 2-Nura) 0.3 mg/0.3 mL SOAJ; Inject 0.3 mL (0.3 mg total) into a muscle once for 1 dose    Gastroesophageal reflux disease, unspecified whether esophagitis present  Chronic patient had EGD in  for report reviewcurrently on omeprazole 20 mg contin's history       Abnormal blood chemistry  During ER visit blood work review abnormal anion gap recommend to repeat blood work we will follow-up with the result accordingly  Orders:  •  Basic metabolic panel; Future    Post-menopause    Orders:  •  DXA bone density spine hip and pelvis; Future      Assessment & Plan           History of Present Illness   HPI  Patient here in the office status post ER visit  for atypical chest pain no short of breath no dyspnea on exertion ER record review including EKG cardiac enzyme patient had a history of GERD and insomnia induced by alcohol patient recommended to follow-up with  "PCP secondary to blood pressure uncontrolled today in the office blood pressure control she is asymptomatic  Review of Systems   Constitutional:  Negative for activity change, appetite change, fatigue and fever.   HENT:  Negative for congestion, ear pain, sinus pressure, sinus pain and sore throat.    Eyes:  Negative for discharge and redness.   Respiratory:  Negative for cough, chest tightness and shortness of breath.    Cardiovascular:  Negative for chest pain, palpitations and leg swelling.   Gastrointestinal:  Negative for abdominal pain, constipation and diarrhea.   Genitourinary:  Negative for dysuria, flank pain, frequency and hematuria.   Musculoskeletal:  Negative for gait problem.   Skin:  Negative for pallor and rash.   Neurological:  Negative for dizziness, tremors, weakness, numbness and headaches.   Hematological:  Does not bruise/bleed easily.       Objective   /84   Pulse 60   Temp (!) 96.3 °F (35.7 °C) (Tympanic)   Ht 5' 1.5\" (1.562 m)   Wt 79.8 kg (176 lb)   SpO2 99%   Breastfeeding No   BMI 32.72 kg/m²      Physical Exam  Vitals and nursing note reviewed.   Constitutional:       General: She is not in acute distress.     Appearance: She is well-developed. She is not diaphoretic.   HENT:      Head: Normocephalic.      Right Ear: Tympanic membrane and external ear normal.      Left Ear: Tympanic membrane and external ear normal.      Nose: No rhinorrhea.      Mouth/Throat:      Pharynx: No posterior oropharyngeal erythema.     Eyes:      General:         Right eye: No discharge.         Left eye: No discharge.      Conjunctiva/sclera: Conjunctivae normal.     Neck:      Vascular: No JVD.     Cardiovascular:      Rate and Rhythm: Normal rate and regular rhythm.      Heart sounds: Normal heart sounds. No murmur heard.     No gallop.   Pulmonary:      Effort: Pulmonary effort is normal. No respiratory distress.      Breath sounds: Normal breath sounds. No wheezing.   Abdominal:      " General: There is no distension.      Palpations: Abdomen is soft.      Tenderness: There is no abdominal tenderness. There is no rebound.     Musculoskeletal:         General: No tenderness.      Cervical back: Normal range of motion and neck supple.   Lymphadenopathy:      Cervical: No cervical adenopathy.     Skin:     General: Skin is warm.      Findings: No rash.     Neurological:      Mental Status: She is alert and oriented to person, place, and time.

## 2025-06-18 NOTE — ASSESSMENT & PLAN NOTE
Chronic fair control patient uses Klonopin on as needed basis proper use and possible side effect review  Orders:  •  clonazePAM (KlonoPIN) 0.5 mg tablet; Take 1 tablet (0.5 mg total) by mouth daily at bedtime Insomnia  •  Basic metabolic panel; Future

## 2025-06-18 NOTE — ASSESSMENT & PLAN NOTE
Chronic patient had EGD in 2020 for report reviewcurrently on omeprazole 20 mg contin's history

## 2025-06-27 ENCOUNTER — NURSE TRIAGE (OUTPATIENT)
Age: 55
End: 2025-06-27

## 2025-06-27 NOTE — TELEPHONE ENCOUNTER
"REASON FOR CONVERSATION: Hypertension    SYMPTOMS: Denies any symptoms currently.  States that at times she has headaches.  States that when she feels a headache coming on, she will take another 1/2 tablet of losartan.  Also reports occasional dizziness and brief episodes of chest pain.  Last episode of chest pain was yesterday, reports that it lasted a brief period, could not quantify.   BP has been running high, this morning was 164/101 before taking her medication.  States that she usually takes her BP meds in the afternoon.    OTHER HEALTH INFORMATION: Currently prescribed amlodipine 2.5 mg daily and losartan 100 mg daily.  States that she has been taking an extra 1/2 tablet of losartan about every other day.    PROTOCOL DISPOSITION: See Within 3 Days in Office    CARE ADVICE PROVIDED: Patient requesting to see Dr Osorio on Monday 6/30. Unable to schedule patient within 3 days.  Warm transferred to [facility] in clerical to schedule patient for appointment on Monday 6/30. Advised patient that if chest  pain reoccurs or if she develops shortness of breath that she should go to the ER for evaluation.  Understanding verbalized.     PRACTICE FOLLOW-UP: [facility]30 [facility] Dr. Osorio.    Reason for Disposition   Systolic BP >= 160 OR Diastolic >= 100    Answer Assessment - Initial Assessment Questions  1. BLOOD PRESSURE: \"What is your blood pressure?\" \"Did you take at least two measurements 5 minutes apart?\"      164/101 earlier today before taking medication  2. ONSET: \"When did you take your blood pressure?\"      This morning  3. HOW: \"How did you take your blood pressure?\" (e.g., automatic home BP monitor, visiting nurse)      Automatic BP cuff  4. HISTORY: \"Do you have a history of high blood pressure?\"      Yes  5. MEDICINES: \"Are you taking any medicines for blood pressure?\" \"Have you missed any doses recently?\"      Amlodipine 2.5 mg daily  Losartan 100 mg daily  6. " "OTHER SYMPTOMS: \"Do you have any symptoms?\" (e.g., blurred vision, chest pain, difficulty breathing, headache, weakness)      Occasional headaches, states that sometimes she has chest pain and dizziness.  States that she had chest pain yesterday- states that it didn't last very long. Denies any symptoms currently.    Protocols used: Blood Pressure - High-Adult-OH    "

## 2025-06-30 ENCOUNTER — OFFICE VISIT (OUTPATIENT)
Dept: FAMILY MEDICINE CLINIC | Facility: CLINIC | Age: 55
End: 2025-06-30
Payer: COMMERCIAL

## 2025-06-30 VITALS
DIASTOLIC BLOOD PRESSURE: 60 MMHG | RESPIRATION RATE: 17 BRPM | WEIGHT: 170 LBS | HEIGHT: 63 IN | BODY MASS INDEX: 30.12 KG/M2 | TEMPERATURE: 97 F | SYSTOLIC BLOOD PRESSURE: 110 MMHG | OXYGEN SATURATION: 98 % | HEART RATE: 77 BPM

## 2025-06-30 DIAGNOSIS — I10 PRIMARY HYPERTENSION: Primary | ICD-10-CM

## 2025-06-30 DIAGNOSIS — E66.9 OBESITY (BMI 30-39.9): ICD-10-CM

## 2025-06-30 DIAGNOSIS — F43.9 STRESS: ICD-10-CM

## 2025-06-30 DIAGNOSIS — K21.9 GASTROESOPHAGEAL REFLUX DISEASE, UNSPECIFIED WHETHER ESOPHAGITIS PRESENT: ICD-10-CM

## 2025-06-30 PROCEDURE — 99214 OFFICE O/P EST MOD 30 MIN: CPT | Performed by: FAMILY MEDICINE

## 2025-06-30 NOTE — PATIENT INSTRUCTIONS
Take BP med as directed and lose weight as directed and takegerd med as directed. Rec also avoiding processed foods and avoiding stress and getting at least 8 hours sleep per night. Call if any problems and signed FMLA forms.

## 2025-06-30 NOTE — PROGRESS NOTES
Name: Echo Dalton      : 1970      MRN: [id number]  Encounter Provider: Paulie Osorio DO  Encounter Date: 2025   Encounter department: [facility] PRIMARY CARE  Chief Complaint   Patient presents with   • Hypertension     Pt is here for f/up for high BP, went to [facility].      Patient Instructions   Take BP med as directed and lose weight as directed and takegerd med as directed. Rec also avoiding processed foods and avoiding stress and getting at least 8 hours sleep per night. Call if any problems and signed FMLA forms.     Assessment & Plan  Primary hypertension  Stable BP today while on med       Gastroesophageal reflux disease, unspecified whether esophagitis present  Take gerd med prn and is satble       Stress  Reduce stress       Obesity (BMI 30-39.9)  {Lose weight as directed to get BMI lower than 25              History of Present Illness     Hypertension (Pt is here for f/up for high BP, went to Urgent Care 25. )    Hypertension      Review of Systems   Constitutional: Negative.    HENT: Negative.     Eyes: Negative.    Respiratory: Negative.     Cardiovascular: Negative.    Gastrointestinal: Negative.    Endocrine: Negative.    Genitourinary: Negative.    Musculoskeletal: Negative.    Skin: Negative.    Allergic/Immunologic: Negative.    Neurological: Negative.    Hematological: Negative.    Psychiatric/Behavioral: Negative.       Past Medical History[1]  Past Surgical History[2]  Family History[3]  Social History[4]  Medications[5]  Allergies   Allergen Reactions   • Crab Extract Allergy Skin Test - Food Allergy      Lips swell   • Penicillins    • Pollen Extract    • Amoxicillin Rash     Face swells     Immunization History   Administered Date(s) Administered   • COVID-19 PFIZER VACCINE 0.3 ML IM 2021, 2021   • COVID-19 Pfizer mRNA vacc PF hiral-sucrose 12 yr and older (Comirnaty) 2025   • INFLUENZA 10/22/2015, 10/17/2018   • Influenza  "Quadrivalent Preservative Free 3 years and older IM 10/22/2015   • Influenza, injectable, quadrivalent, preservative free 0.5 mL 10/19/2022   • Tdap 06/14/2018     Objective   /60   Pulse 77   Temp (!) 97 °F (36.1 °C) (Temporal)   Resp 17   Ht 5' 2.99\" (1.6 m)   Wt 77.1 kg (170 lb)   SpO2 98%   BMI 30.12 kg/m²     Physical Exam  Constitutional:       Appearance: She is well-developed. She is obese.   HENT:      Head: Normocephalic and atraumatic.      Right Ear: External ear normal.      Left Ear: External ear normal.      Nose: Nose normal.      Mouth/Throat:      Mouth: Mucous membranes are moist.     Eyes:      Conjunctiva/sclera: Conjunctivae normal.      Pupils: Pupils are equal, round, and reactive to light.       Cardiovascular:      Rate and Rhythm: Normal rate and regular rhythm.      Pulses: Normal pulses.      Heart sounds: Normal heart sounds.   Pulmonary:      Effort: Pulmonary effort is normal.      Breath sounds: Normal breath sounds.     Musculoskeletal:         General: Normal range of motion.      Cervical back: Normal range of motion and neck supple.     Skin:     General: Skin is warm and dry.      Capillary Refill: Capillary refill takes less than 2 seconds.     Neurological:      General: No focal deficit present.      Mental Status: She is alert and oriented to person, place, and time. Mental status is at baseline.      Deep Tendon Reflexes: Reflexes are normal and symmetric.     Psychiatric:         Mood and Affect: Mood normal.         Behavior: Behavior normal.         Thought Content: Thought content normal.         Judgment: Judgment normal.       Administrative Statements   I have spent a total time of 32 minutes in caring for this patient on the day of the visit/encounter including Diagnostic results, Prognosis, Risks and benefits of tx options, Instructions for management, Patient and family education, Importance of tx compliance, Risk factor reductions, Impressions, " Counseling / Coordination of care, Documenting in the medical record, Reviewing/placing orders in the medical record (including tests, medications, and/or procedures), and Obtaining or reviewing history  .         [1]  Past Medical History:  Diagnosis Date   • Abdominal mass, RLQ (right lower quadrant)     last assessed 2015   • Allergic rhinitis     last assessed 2012   • Carpal tunnel syndrome    • Chronic left shoulder pain    • Chronic pain disorder     left shoulder   • Epigastric pain    • Fracture of ankle     last assessed 10/21/2013   • Frequent headaches    • Gall stones    • GERD (gastroesophageal reflux disease)    • Hiatal hernia     last assessed 2012   • Hypertension    • Insomnia    • Left supraspinatus tendinitis    • Obesity surgery status    • Onychomycosis    • Osteopenia    • Plantar fasciitis    • Postgastrectomy malabsorption    • RUQ pain    • Sacroiliitis (HCC)    [2]  Past Surgical History:  Procedure Laterality Date   • CHOLECYSTECTOMY     • GASTRIC BYPASS     • HERNIA REPAIR     • LA EGD TRANSORAL BIOPSY SINGLE/MULTIPLE N/A 2018    Procedure: ESOPHAGOGASTRODUODENOSCOPY (EGD);  Surgeon: James Abdi MD;  Location: [facility];  Service: Bariatrics   • LA LAPS ABD PRTM&OMENTUM DX W/WO SPEC BR/WA SPX N/A 2025    Procedure: DIAGNOSTIC LAPAROSCOPY WITH ROBOTICS, LYSIS OF ADHESIONS;  Surgeon: James Abdi MD;  Location: [facility] OR;  Service: Bariatrics   • ROTATOR CUFF REPAIR Left    • SHOULDER SURGERY Left    [3]  Family History  Problem Relation Name Age of Onset   • Stroke Father          syndrome   • No Known Problems Maternal Grandmother     • No Known Problems Maternal Grandfather     • No Known Problems Paternal Grandmother     • No Known Problems Paternal Grandfather     • Osteoporosis Family     [4]  Social History  Tobacco Use   • Smoking status: Former     Types: Cigars     Start date:      Quit date:      Years since quittin.4     Passive  exposure: Never   • Smokeless tobacco: Never   Vaping Use   • Vaping status: Never Used   Substance and Sexual Activity   • Alcohol use: Yes     Comment: socially   • Drug use: No   • Sexual activity: Yes     Partners: Female     Comment: without practicing 'safer sex'   [5]  Current Outpatient Medications on File Prior to Visit   Medication Sig   • albuterol (Proventil HFA) 90 mcg/act inhaler Inhale 1 puff 4 (four) times a day prn   • amLODIPine (NORVASC) 2.5 mg tablet Take 1 tablet (2.5 mg total) by mouth daily   • Calcium Citrate 1040 MG TABS Take 1,040 mg by mouth in the morning   • clonazePAM (KlonoPIN) 0.5 mg tablet Take 1 tablet (0.5 mg total) by mouth daily at bedtime Insomnia   • cyclobenzaprine (FLEXERIL) 10 mg tablet Take 1 tablet (10 mg total) by mouth 3 (three) times a day as needed for muscle spasms   • ergocalciferol (VITAMIN D2) 50,000 units Take 1 capsule (50,000 Units total) by mouth once a week   • lidocaine (Lidoderm) 5 % Apply 1 patch topically over 12 hours daily Remove & Discard patch within 12 hours or as directed by MD   • losartan (COZAAR) 100 MG tablet Take 1 tablet (100 mg total) by mouth daily   • Multiple Vitamins-Minerals (HAIR/SKIN/NAILS/BIOTIN) TABS Take 1 tablet by mouth in the morning   • omeprazole (PriLOSEC) 20 mg delayed release capsule Take 1 capsule (20 mg total) by mouth daily   • vitamin E, tocopherol, 400 units capsule Take 400 Units by mouth in the morning.   • EPINEPHrine (EpiPen 2-Nura) 0.3 mg/0.3 mL SOAJ Inject 0.3 mL (0.3 mg total) into a muscle once for 1 dose

## (undated) DEVICE — TIP COVER ACCESSORY

## (undated) DEVICE — GLOVE SRG BIOGEL 7.5

## (undated) DEVICE — CHLORAPREP HI-LITE 26ML ORANGE

## (undated) DEVICE — SYRINGE 30ML LL

## (undated) DEVICE — DISPOSABLE OR TOWEL: Brand: CARDINAL HEALTH

## (undated) DEVICE — ELECTRO LUBE IS A SINGLE PATIENT USE DEVICE THAT IS INTENDED TO BE USED ON ELECTROSURGICAL ELECTRODES TO REDUCE STICKING.: Brand: KEY SURGICAL ELECTRO LUBE

## (undated) DEVICE — CADIERE FORCEPS: Brand: ENDOWRIST

## (undated) DEVICE — COLUMN DRAPE

## (undated) DEVICE — REM POLYHESIVE ADULT PATIENT RETURN ELECTRODE: Brand: VALLEYLAB

## (undated) DEVICE — GLOVE SRG BIOGEL 8

## (undated) DEVICE — SCD SEQUENTIAL COMPRESSION COMFORT SLEEVE MEDIUM KNEE LENGTH: Brand: KENDALL SCD

## (undated) DEVICE — EXOFIN PRECISION PEN HIGH VISCOSITY TOPICAL SKIN ADHESIVE: Brand: EXOFIN PRECISION PEN, 1G

## (undated) DEVICE — SUT MONOCRYL 4-0 PS-2 27 IN Y426H

## (undated) DEVICE — NEEDLE SPINAL18G X 3.5 IN QUINCKE

## (undated) DEVICE — [HIGH FLOW INSUFFLATOR,  DO NOT USE IF PACKAGE IS DAMAGED,  KEEP DRY,  KEEP AWAY FROM SUNLIGHT,  PROTECT FROM HEAT AND RADIOACTIVE SOURCES.]: Brand: PNEUMOSURE

## (undated) DEVICE — SPINAL NEEDLE 22 G X 2 1/2

## (undated) DEVICE — SYRINGE 20ML LL

## (undated) DEVICE — INSUFFLATION NEEDLE TO ESTABLISH PNEUMOPERITONEUM.: Brand: INSUFFLATION NEEDLE

## (undated) DEVICE — DECANTER: Brand: UNBRANDED

## (undated) DEVICE — MONOPOLAR CURVED SCISSORS: Brand: ENDOWRIST

## (undated) DEVICE — DRAPE EQUIPMENT RF WAND

## (undated) DEVICE — PACK PBDS LAP CHOLE RF

## (undated) DEVICE — INTENDED FOR TISSUE SEPARATION, AND OTHER PROCEDURES THAT REQUIRE A SHARP SURGICAL BLADE TO PUNCTURE OR CUT.: Brand: BARD-PARKER SAFETY BLADES SIZE 15, STERILE

## (undated) DEVICE — WEBRIL 6 IN UNSTERILE

## (undated) DEVICE — SURGICAL GOWN, XL SMARTSLEEVE: Brand: CONVERTORS

## (undated) DEVICE — TROCAR: Brand: KII FIOS FIRST ENTRY

## (undated) DEVICE — ARM DRAPE